# Patient Record
Sex: FEMALE | Race: WHITE | NOT HISPANIC OR LATINO | Employment: OTHER | URBAN - METROPOLITAN AREA
[De-identification: names, ages, dates, MRNs, and addresses within clinical notes are randomized per-mention and may not be internally consistent; named-entity substitution may affect disease eponyms.]

---

## 2017-05-25 ENCOUNTER — ALLSCRIPTS OFFICE VISIT (OUTPATIENT)
Dept: OTHER | Facility: OTHER | Age: 67
End: 2017-05-25

## 2017-05-25 DIAGNOSIS — Z12.31 ENCOUNTER FOR SCREENING MAMMOGRAM FOR MALIGNANT NEOPLASM OF BREAST: ICD-10-CM

## 2017-05-25 DIAGNOSIS — Z13.820 ENCOUNTER FOR SCREENING FOR OSTEOPOROSIS: ICD-10-CM

## 2017-05-25 DIAGNOSIS — Z78.0 ASYMPTOMATIC MENOPAUSAL STATE: ICD-10-CM

## 2017-05-31 LAB
HPV 18 (HISTORICAL): NOT DETECTED
HPV HIGH RISK 16/18 (HISTORICAL): NOT DETECTED
HPV16 (HISTORICAL): NOT DETECTED
PAP (HISTORICAL): NORMAL

## 2017-06-16 ENCOUNTER — HOSPITAL ENCOUNTER (OUTPATIENT)
Dept: RADIOLOGY | Age: 67
Discharge: HOME | End: 2017-06-16
Payer: COMMERCIAL

## 2017-06-16 DIAGNOSIS — Z12.31 ENCOUNTER FOR SCREENING MAMMOGRAM FOR MALIGNANT NEOPLASM OF BREAST: ICD-10-CM

## 2017-06-16 DIAGNOSIS — Z13.820 ENCOUNTER FOR SCREENING FOR OSTEOPOROSIS: ICD-10-CM

## 2017-06-16 DIAGNOSIS — Z78.0 ASYMPTOMATIC MENOPAUSAL STATE: ICD-10-CM

## 2017-06-16 PROCEDURE — 77080 DXA BONE DENSITY AXIAL: CPT

## 2017-06-16 PROCEDURE — G0202 SCR MAMMO BI INCL CAD: HCPCS

## 2018-01-14 VITALS
SYSTOLIC BLOOD PRESSURE: 140 MMHG | DIASTOLIC BLOOD PRESSURE: 88 MMHG | WEIGHT: 119 LBS | HEIGHT: 65 IN | BODY MASS INDEX: 19.83 KG/M2

## 2018-05-29 ENCOUNTER — ANNUAL EXAM (OUTPATIENT)
Dept: OBGYN CLINIC | Facility: CLINIC | Age: 68
End: 2018-05-29
Payer: MEDICARE

## 2018-05-29 VITALS
BODY MASS INDEX: 19.49 KG/M2 | DIASTOLIC BLOOD PRESSURE: 72 MMHG | SYSTOLIC BLOOD PRESSURE: 138 MMHG | HEIGHT: 65 IN | WEIGHT: 117 LBS

## 2018-05-29 DIAGNOSIS — Z01.419 GYNECOLOGIC EXAM NORMAL: Primary | ICD-10-CM

## 2018-05-29 DIAGNOSIS — Z12.31 ENCOUNTER FOR SCREENING MAMMOGRAM FOR MALIGNANT NEOPLASM OF BREAST: ICD-10-CM

## 2018-05-29 PROBLEM — Z78.0 MENOPAUSE: Status: ACTIVE | Noted: 2017-05-25

## 2018-05-29 PROCEDURE — G0101 CA SCREEN;PELVIC/BREAST EXAM: HCPCS | Performed by: PHYSICIAN ASSISTANT

## 2018-05-29 NOTE — ASSESSMENT & PLAN NOTE
Pap guidelines reviewed  Deferred pap today, Always normal and 2017 NILM (-)HRHPV     Return to office in 2 years for annual exam

## 2018-05-29 NOTE — PROGRESS NOTES
Assessment/Plan   Problem List Items Addressed This Visit     Gynecologic exam normal - Primary     Pap guidelines reviewed  Deferred pap today, Always normal and 2017 NILM (-)HRHPV  Return to office in 2 years for annual exam             Other Visit Diagnoses     Encounter for screening mammogram for malignant neoplasm of breast        Relevant Orders    Mammo screening bilateral w 3d & cad          Subjective:     Patient ID: Anupama Born is a 74 y o  y o  female  HPI  73 yo seen for annual exam  LMP:  no bleeding since  Denies bowel or bladder issues  Last pap: 2017 NILM (-)HRHPV  Last mammogram: 2017 BIRADS 2: Benign  Recommend 3D mammogram    The following portions of the patient's history were reviewed and updated as appropriate:   She  has no past medical history on file  She   Patient Active Problem List    Diagnosis Date Noted    Gynecologic exam normal 2018    Menopause 2017    Tick bite of back 10/28/2014     She  has no past surgical history on file  Her family history includes Hypertension in her father  She  reports that she has never smoked  She has never used smokeless tobacco  She reports that she drinks alcohol  She reports that she does not use drugs  Current Outpatient Prescriptions   Medication Sig Dispense Refill    Probiotic Product (PROBIOTIC-10 PO) Take by mouth       No current facility-administered medications for this visit  She has No Known Allergies       Menstrual History:  OB History      Para Term  AB Living    2 2 2     3    SAB TAB Ectopic Multiple Live Births          1 1         Menarche age: 17  Patient's last menstrual period was 2000 (within months)  Review of Systems   Constitutional: Negative for fatigue, fever and unexpected weight change  HENT: Negative for dental problem and sinus pressure  Eyes: Negative for visual disturbance     Respiratory: Negative for cough, shortness of breath and wheezing  Cardiovascular: Negative for chest pain  Gastrointestinal: Negative for abdominal pain, blood in stool, constipation, diarrhea, nausea and vomiting  Endocrine: Negative for polydipsia  Genitourinary: Negative for difficulty urinating, dyspareunia, dysuria, frequency, hematuria, pelvic pain and urgency  Musculoskeletal: Negative for arthralgias and back pain  Neurological: Negative for dizziness, seizures, light-headedness and headaches  Psychiatric/Behavioral: Negative for suicidal ideas  The patient is not nervous/anxious  Objective:     Physical Exam   Constitutional: She is oriented to person, place, and time  She appears well-developed and well-nourished  Genitourinary: Rectum normal, vagina normal and uterus normal  There is no rash, tenderness, lesion, injury or Bartholin's cyst on the right labia  There is no rash, tenderness, lesion, injury or Bartholin's cyst on the left labia  Vagina exhibits no lesion  No erythema, tenderness or bleeding in the vagina  No signs of injury around the vagina  No vaginal discharge found  Right adnexum does not display mass, does not display tenderness and does not display fullness  Left adnexum does not display mass, does not display tenderness and does not display fullness  Cervix does not exhibit motion tenderness, lesion or discharge  Uterus is not enlarged, tender, exhibiting a mass, irregular (is regular) or mobile  Rectal exam shows no external hemorrhoid, no internal hemorrhoid, no fissure, no mass, no tenderness, anal tone normal and guaiac negative stool  HENT:   Head: Normocephalic and atraumatic  Neck: No thyromegaly present  Cardiovascular: Normal rate, regular rhythm and normal heart sounds  Exam reveals no gallop and no friction rub  No murmur heard  Pulmonary/Chest: Effort normal and breath sounds normal  No respiratory distress  She has no wheezes   Right breast exhibits no inverted nipple, no mass, no nipple discharge, no skin change and no tenderness  Left breast exhibits no inverted nipple, no mass, no nipple discharge, no skin change and no tenderness  Breasts are symmetrical  There is no breast swelling  Abdominal: Soft  She exhibits no distension and no mass  There is no tenderness  There is no rebound and no guarding  No hernia  Lymphadenopathy:     She has no cervical adenopathy  Right: No inguinal adenopathy present  Left: No inguinal adenopathy present  Neurological: She is alert and oriented to person, place, and time  Skin: Skin is warm and dry  Psychiatric: She has a normal mood and affect   Her behavior is normal

## 2018-06-22 ENCOUNTER — HOSPITAL ENCOUNTER (OUTPATIENT)
Dept: RADIOLOGY | Age: 68
Discharge: HOME | End: 2018-06-22
Payer: MEDICARE

## 2018-06-22 ENCOUNTER — TRANSCRIBE ORDERS (OUTPATIENT)
Dept: OBGYN CLINIC | Facility: CLINIC | Age: 68
End: 2018-06-22

## 2018-06-22 DIAGNOSIS — Z12.31 ENCOUNTER FOR SCREENING MAMMOGRAM FOR MALIGNANT NEOPLASM OF BREAST: ICD-10-CM

## 2018-06-22 DIAGNOSIS — R92.8 ABNORMAL MAMMOGRAM: Primary | ICD-10-CM

## 2018-06-22 PROCEDURE — 77063 BREAST TOMOSYNTHESIS BI: CPT

## 2018-06-22 PROCEDURE — 77067 SCR MAMMO BI INCL CAD: CPT

## 2018-07-02 ENCOUNTER — HOSPITAL ENCOUNTER (OUTPATIENT)
Dept: RADIOLOGY | Facility: HOSPITAL | Age: 68
Discharge: HOME | End: 2018-07-02
Payer: MEDICARE

## 2018-07-02 DIAGNOSIS — R92.8 ABNORMAL MAMMOGRAM: ICD-10-CM

## 2018-07-02 PROCEDURE — 77065 DX MAMMO INCL CAD UNI: CPT

## 2018-07-17 ENCOUNTER — HOSPITAL ENCOUNTER (OUTPATIENT)
Dept: RADIOLOGY | Facility: HOSPITAL | Age: 68
Discharge: HOME | End: 2018-07-17
Payer: MEDICARE

## 2018-07-17 ENCOUNTER — TRANSCRIBE ORDERS (OUTPATIENT)
Dept: ADMINISTRATIVE | Facility: HOSPITAL | Age: 68
End: 2018-07-17

## 2018-07-17 VITALS
RESPIRATION RATE: 16 BRPM | DIASTOLIC BLOOD PRESSURE: 75 MMHG | SYSTOLIC BLOOD PRESSURE: 150 MMHG | OXYGEN SATURATION: 98 % | HEART RATE: 80 BPM

## 2018-07-17 DIAGNOSIS — R92.1 BREAST CALCIFICATIONS: ICD-10-CM

## 2018-07-17 DIAGNOSIS — R92.1 CALCIFICATION OF RIGHT BREAST: ICD-10-CM

## 2018-07-17 PROCEDURE — 88305 TISSUE EXAM BY PATHOLOGIST: CPT | Performed by: PATHOLOGY

## 2018-07-17 PROCEDURE — 19081 BX BREAST 1ST LESION STRTCTC: CPT

## 2018-07-17 PROCEDURE — 88361 TUMOR IMMUNOHISTOCHEM/COMPUT: CPT | Performed by: PATHOLOGY

## 2018-07-17 RX ORDER — LIDOCAINE HYDROCHLORIDE 10 MG/ML
INJECTION, SOLUTION INFILTRATION; PERINEURAL CODE/TRAUMA/SEDATION MEDICATION
Status: COMPLETED | OUTPATIENT
Start: 2018-07-17 | End: 2018-07-17

## 2018-07-17 RX ORDER — LIDOCAINE HYDROCHLORIDE AND EPINEPHRINE 10; 10 MG/ML; UG/ML
INJECTION, SOLUTION INFILTRATION; PERINEURAL CODE/TRAUMA/SEDATION MEDICATION
Status: COMPLETED | OUTPATIENT
Start: 2018-07-17 | End: 2018-07-17

## 2018-07-17 RX ADMIN — LIDOCAINE HYDROCHLORIDE 10 ML: 10 INJECTION, SOLUTION INFILTRATION; PERINEURAL at 09:02

## 2018-07-17 RX ADMIN — LIDOCAINE HYDROCHLORIDE AND EPINEPHRINE 20 ML: 10; 10 INJECTION, SOLUTION INFILTRATION; PERINEURAL at 09:06

## 2018-07-17 NOTE — SEDATION DOCUMENTATION
Procedure ended, pt tolerated procedure  Pressure held to site for 15 min total, pt with hematoma noted  Dr Lina Saleem examined pt, ace wrap recommended  steristrps to site with gauze dressing covering  Ace wrap to right breast  Ice pack applied  Discharge instructions given to patient and reviewed  Pt verbalized understanding

## 2018-07-23 NOTE — PROGRESS NOTES
7/23/18 at 1120    Patient Past Medical History: None    Allergies: NKDA    Past Breast History:     Previous Biopsy:   Yes______ No___X_____      Breast: Right____ Left______       Malignant______ Benign_______      Treatments if applicable      Present Breast History: (DCIS, high nuclear grade, solid type with central comedo necrosis)     Right_____X_____    Left_____________     Density_________    Calcifications____________   Palpable______________      Patient notified of results on: 7/20/18    Date of Appointment with Surgeon Dr Mary Kenyon on 7/30/18 at [phone]

## 2018-07-25 PROBLEM — D05.11 DUCTAL CARCINOMA IN SITU (DCIS) OF RIGHT BREAST: Status: ACTIVE | Noted: 2018-07-25

## 2018-07-30 ENCOUNTER — OFFICE VISIT (OUTPATIENT)
Dept: SURGICAL ONCOLOGY | Facility: CLINIC | Age: 68
End: 2018-07-30
Payer: MEDICARE

## 2018-07-30 VITALS
DIASTOLIC BLOOD PRESSURE: 90 MMHG | SYSTOLIC BLOOD PRESSURE: 142 MMHG | BODY MASS INDEX: 19.08 KG/M2 | HEART RATE: 80 BPM | TEMPERATURE: 97.7 F | HEIGHT: 65 IN | RESPIRATION RATE: 16 BRPM | WEIGHT: 114.5 LBS

## 2018-07-30 DIAGNOSIS — D05.11 DUCTAL CARCINOMA IN SITU (DCIS) OF RIGHT BREAST: Primary | ICD-10-CM

## 2018-07-30 PROCEDURE — 99205 OFFICE O/P NEW HI 60 MIN: CPT | Performed by: SURGERY

## 2018-07-30 NOTE — LETTER
July 30, 2018     [facility] [address] [place] [id number]    Patient: Mary Daly   YOB: 1950   Date of Visit: 7/30/2018       Dear Dr Sav Godoy:    Thank you for referring Manjeet Sebastian to me for evaluation  Below are my notes for this consultation  If you have questions, please do not hesitate to call me  I look forward to following your patient along with you  Sincerely,        Jolly De La Rosa MD        CC: No Recipients  Jolly De La Rosa MD  7/30/2018 12:27 PM  Sign at close encounter               Surgical Oncology Consult Note       [address] [facility] [address]  1950  [id number]  [address] [facility],[address]  [facility] [address] [address]      Chief Complaint:     Chief Complaint   Patient presents with    Consult     new DCIS right breast    Breast Cancer       Assessment and Plan:   Assessment/Plan   Patient has new diagnosis of right breast ductal carcinoma in situ, 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  The patient's microcalcifications extended relatively large area of her central breast extending from her nipple posterior to her muscle  I explained that removing this much of her central breast would be cosmetically deforming and that while a lumpectomy could potentially be done it would not be cosmetically acceptable and consequently I have recommended a mastectomy with possible reconstruction  The patient prefers reconstruction  We will have her see Dr Juan Alberto Payne for an opinion  All questions were answered the patient's satisfaction  We will see her back following plastic surgery      Oncology History:        Ductal carcinoma in situ (DCIS) of right breast    7/17/2018 Biopsy     Right breast biopsy  Upper inner breast  DCIS  Grade 3  ER 0  KS 0  Stage 0            History of Present Illness:   See above    Review of Systems:   Review of Systems   Constitutional: Negative for activity change, appetite change and fatigue  HENT: Negative  Eyes: Negative  Respiratory: Negative for cough, shortness of breath and wheezing  Cardiovascular: Negative for chest pain and leg swelling  Gastrointestinal: Negative  Endocrine: Negative  Genitourinary: Negative  Musculoskeletal:        No new changes or complaints of bone pain   Skin: Negative  Allergic/Immunologic: Negative  Neurological: Negative  Hematological: Negative  Psychiatric/Behavioral: Negative  Past Medical History:      Patient Active Problem List   Diagnosis    Menopause    Tick bite of back    Gynecologic exam normal    Ductal carcinoma in situ (DCIS) of right breast        Past Medical History:   Diagnosis Date    Childhood asthma       History reviewed  No pertinent surgical history  Family History   Problem Relation Age of Onset    Hypertension Father     Prostate cancer Father     Melanoma Sister         Social History     Social History    Marital status: Single     Spouse name: N/A    Number of children: N/A    Years of education: N/A     Occupational History    Not on file  Social History Main Topics    Smoking status: Never Smoker    Smokeless tobacco: Never Used    Alcohol use Yes      Comment: 2 drinks/mo    Drug use: No    Sexual activity: Yes     Partners: Male     Birth control/ protection: Post-menopausal     Other Topics Concern    Not on file     Social History Narrative    No narrative on file        Current Outpatient Prescriptions:     Probiotic Product (PROBIOTIC-10 PO), Take by mouth, Disp: , Rfl:    No Known Allergies    Physical Exam:     Vitals:    07/30/18 1130   BP: 142/90   Pulse: 80   Resp: 16   Temp: 97 7 °F (36 5 °C)     Physical Exam   Constitutional: She is oriented to person, place, and time  She appears well-developed and well-nourished     HENT:   Head: Normocephalic and atraumatic  Mouth/Throat: Oropharynx is clear and moist    Eyes: EOM are normal  Pupils are equal, round, and reactive to light  Neck: Normal range of motion  Neck supple  No JVD present  No tracheal deviation present  No thyromegaly present  Cardiovascular: Normal rate, regular rhythm, normal heart sounds and intact distal pulses  Exam reveals no gallop and no friction rub  No murmur heard  Pulmonary/Chest: Effort normal and breath sounds normal  No respiratory distress  She has no wheezes  She has no rales  There is a large hematoma surrounding the right periareolar region as outlined in the drawing  There is no axillary adenopathy  The left breast was entirely normal in both the sitting and supine position  Abdominal: Soft  She exhibits no distension and no mass  There is no hepatomegaly  There is no tenderness  There is no rebound and no guarding  Musculoskeletal: Normal range of motion  She exhibits no edema or tenderness  Lymphadenopathy:     She has no cervical adenopathy  Neurological: She is alert and oriented to person, place, and time  No cranial nerve deficit  Skin: Skin is warm and dry  No rash noted  No erythema  Psychiatric: She has a normal mood and affect  Her behavior is normal    Vitals reviewed  Results:   Pathology:  I reviewed her pathology report   Imaging  I reviewed her imaging  I also reviewed her calcifications with Dr Alan Ahumada  Based on that discussion of I recommended a mastectomy as outlined above  Discussion/Summary:   I explained the patient that she has ER negative AR negative grade 3 ductal carcinoma in situ covering a relatively large area of her breast   I have recommended a mastectomy for the reasons outlined above  The patient is agreeable to this approach  I have also recommended a plastic surgeon consult which she is agreeable to an in favor of  We will see her back following these areas    I also explained that because we would be doing a mastectomy I would recommend a sentinel lymph node biopsy and if there was cancer in the lymph node and axillary dissection  I explained the reasons for this  The patient is agreeable to this approach  We will review this further at her next visit  Advance Care Planning/Advance Directives:  I discussed the disease status, treatment plans and follow-up with the patient

## 2018-07-30 NOTE — PROGRESS NOTES
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  The patient's microcalcifications extended relatively large area of her central breast extending from her nipple posterior to her muscle  I explained that removing this much of her central breast would be cosmetically deforming and that while a lumpectomy could potentially be done it would not be cosmetically acceptable and consequently I have recommended a mastectomy with possible reconstruction  The patient prefers reconstruction  We will have her see Dr Hoang Nava for an opinion  All questions were answered the patient's satisfaction  We will see her back following plastic surgery  Oncology History:        Ductal carcinoma in situ (DCIS) of right breast    7/17/2018 Biopsy     Right breast biopsy  Upper inner breast  DCIS  Grade 3  ER 0  AZ 0  Stage 0            History of Present Illness:   See above    Review of Systems:   Review of Systems   Constitutional: Negative for activity change, appetite change and fatigue  HENT: Negative  Eyes: Negative  Respiratory: Negative for cough, shortness of breath and wheezing  Cardiovascular: Negative for chest pain and leg swelling  Gastrointestinal: Negative  Endocrine: Negative  Genitourinary: Negative  Musculoskeletal:        No new changes or complaints of bone pain   Skin: Negative  Allergic/Immunologic: Negative  Neurological: Negative      Hematological: Negative  Psychiatric/Behavioral: Negative  Past Medical History:      Patient Active Problem List   Diagnosis    Menopause    Tick bite of back    Gynecologic exam normal    Ductal carcinoma in situ (DCIS) of right breast        Past Medical History:   Diagnosis Date    Childhood asthma       History reviewed  No pertinent surgical history  Family History   Problem Relation Age of Onset    Hypertension Father     Prostate cancer Father     Melanoma Sister         Social History     Social History    Marital status: Single     Spouse name: N/A    Number of children: N/A    Years of education: N/A     Occupational History    Not on file  Social History Main Topics    Smoking status: Never Smoker    Smokeless tobacco: Never Used    Alcohol use Yes      Comment: 2 drinks/mo    Drug use: No    Sexual activity: Yes     Partners: Male     Birth control/ protection: Post-menopausal     Other Topics Concern    Not on file     Social History Narrative    No narrative on file        Current Outpatient Prescriptions:     Probiotic Product (PROBIOTIC-10 PO), Take by mouth, Disp: , Rfl:    No Known Allergies    Physical Exam:     Vitals:    07/30/18 1130   BP: 142/90   Pulse: 80   Resp: 16   Temp: 97 7 °F (36 5 °C)     Physical Exam   Constitutional: She is oriented to person, place, and time  She appears well-developed and well-nourished  HENT:   Head: Normocephalic and atraumatic  Mouth/Throat: Oropharynx is clear and moist    Eyes: EOM are normal  Pupils are equal, round, and reactive to light  Neck: Normal range of motion  Neck supple  No JVD present  No tracheal deviation present  No thyromegaly present  Cardiovascular: Normal rate, regular rhythm, normal heart sounds and intact distal pulses  Exam reveals no gallop and no friction rub  No murmur heard  Pulmonary/Chest: Effort normal and breath sounds normal  No respiratory distress  She has no wheezes  She has no rales  There is a large hematoma surrounding the right periareolar region as outlined in the drawing  There is no axillary adenopathy  The left breast was entirely normal in both the sitting and supine position  Abdominal: Soft  She exhibits no distension and no mass  There is no hepatomegaly  There is no tenderness  There is no rebound and no guarding  Musculoskeletal: Normal range of motion  She exhibits no edema or tenderness  Lymphadenopathy:     She has no cervical adenopathy  Neurological: She is alert and oriented to person, place, and time  No cranial nerve deficit  Skin: Skin is warm and dry  No rash noted  No erythema  Psychiatric: She has a normal mood and affect  Her behavior is normal    Vitals reviewed  Results:   Pathology:  I reviewed her pathology report   Imaging  I reviewed her imaging  I also reviewed her calcifications with Dr Kendall Joy  Based on that discussion of I recommended a mastectomy as outlined above  Discussion/Summary:   I explained the patient that she has ER negative SD negative grade 3 ductal carcinoma in situ covering a relatively large area of her breast   I have recommended a mastectomy for the reasons outlined above  The patient is agreeable to this approach  I have also recommended a plastic surgeon consult which she is agreeable to an in favor of  We will see her back following these areas  I also explained that because we would be doing a mastectomy I would recommend a sentinel lymph node biopsy and if there was cancer in the lymph node and axillary dissection  I explained the reasons for this  The patient is agreeable to this approach  We will review this further at her next visit  Advance Care Planning/Advance Directives:  I discussed the disease status, treatment plans and follow-up with the patient

## 2018-08-03 ENCOUNTER — TELEPHONE (OUTPATIENT)
Dept: HEMATOLOGY ONCOLOGY | Facility: CLINIC | Age: 68
End: 2018-08-03

## 2018-08-08 ENCOUNTER — OFFICE VISIT (OUTPATIENT)
Dept: PLASTIC SURGERY | Facility: CLINIC | Age: 68
End: 2018-08-08
Payer: MEDICARE

## 2018-08-08 VITALS — BODY MASS INDEX: 18.83 KG/M2 | HEIGHT: 65 IN | WEIGHT: 113 LBS

## 2018-08-08 DIAGNOSIS — D05.11 DUCTAL CARCINOMA IN SITU (DCIS) OF RIGHT BREAST: ICD-10-CM

## 2018-08-08 DIAGNOSIS — Z71.89 ENCOUNTER TO DISCUSS BREAST RECONSTRUCTION: Primary | ICD-10-CM

## 2018-08-08 PROCEDURE — 99204 OFFICE O/P NEW MOD 45 MIN: CPT | Performed by: SURGERY

## 2018-08-08 NOTE — PROGRESS NOTES
Assessment/Plan:  Please see HPI  She is planned to undergo a therapeutic right mastectomy, her understanding is that the nipple-areolar complex will be removed  We discussed options for reconstruction including immediate versus delayed reconstruction, tissue expander/implant based reconstruction, both traditional two-stage, as well as single stage direct to implant reconstruction  We discussed the role of acellular dermal matrix  We discussed the emerging roll of prepectoral reconstruction as well as the phenomenon of breast implant associated lymphoma  We also talked about autologous options for reconstruction to include latissimus dorsi flap, tram flap, both pedicled and free, as well as other micro surgical options, she is aware that I do not perform micro surgery, and that if she were interested in micro surgery that we would refer her to our practice micro surgeon  She currently wears a 34 a cup bra and she is interested in a bit larger breast volume postoperatively, likely a B cup  Latissimus dorsi contractions are strong, she does not have an ample abdominal pannus to utilize 1 of the abdominal flaps  The appropriate measurements and photographs were obtained  We reviewed photographs of reasonable results of breast reconstruction options, and their questions have been answered to their satisfaction  She is interested in immediate right breast reconstruction utilizing a tissue expander and acellular dermal matrix  Our surgical coordinator will be working with Dr Dana Anderson is office to arrange a mutually convenient date for surgery  At this point, she has a follow-up appoint with Dr Dana Anderson in 1 week  There are no diagnoses linked to this encounter  Subjective:  Right breast cancer     Patient ID: Yesi Vann is a 79 y o  female      HPI she has a recent diagnosis of ductal carcinoma in situ of the right breast, she has been referred by Dr Dana Anderson to discuss options for postmastectomy reconstruction    The following portions of the patient's history were reviewed and updated as appropriate: allergies, current medications, past family history, past medical history, past social history, past surgical history and problem list       Review of Systems   Constitutional: Negative for chills and fever  HENT: Negative for hearing loss  Eyes: Positive for visual disturbance  Negative for discharge  Wears eyeglasses   Respiratory: Positive for shortness of breath  Negative for chest tightness  Shortness of breath with exertion, worse due to high humidity   Cardiovascular: Negative for chest pain and leg swelling  Gastrointestinal: Negative for blood in stool, constipation, diarrhea and nausea  Genitourinary: Negative for dysuria  Musculoskeletal: Negative for gait problem  Skin: Negative for rash  Allergic/Immunologic: Negative for immunocompromised state  Neurological: Negative for seizures and headaches  Hematological: Does not bruise/bleed easily  Psychiatric/Behavioral: Negative for dysphoric mood  The patient is nervous/anxious  Anxiety given recent diagnosis         Objective:      Ht 5' 5" (1 651 m)   Wt 51 3 kg (113 lb)   LMP 01/01/2000 (Within Months)   BMI 18 80 kg/m²          Physical Exam   Constitutional: She is oriented to person, place, and time  She appears well-developed and well-nourished  HENT:   Head: Normocephalic  Eyes: Pupils are equal, round, and reactive to light  Neck: Normal range of motion  Pulmonary/Chest: Effort normal    Abdominal: Soft  Musculoskeletal: Normal range of motion  Neurological: She is alert and oriented to person, place, and time  Skin: Skin is warm  Breast examination reveals A cup breasts  The sternal notch to nipple distances are 19 5 cm on the left, 19 cm on the right, intermammary distance is 15 5 cm  The inframammary fold to nipple distances are 6 5 cm bilaterally   Base diameter is 11 cm bilaterally   Psychiatric: She has a normal mood and affect

## 2018-08-08 NOTE — LETTER
August 8, 2018     Nano Haas MD  [place] [id number]    Patient: Jose Chavez   YOB: 1950   Date of Visit: 8/8/2018       Dear Dr Lucio Bob:    Thank you for referring Crow Peace to me for evaluation  Below are my notes for this consultation  If you have questions, please do not hesitate to call me  I look forward to following your patient along with you  Sincerely,        Sage Bernardo MD        CC: No Recipients  Sage Bernardo MD  8/8/2018  9:42 AM  Sign at close encounter  Assessment/Plan:  Please see HPI  She is planned to undergo a therapeutic right mastectomy, her understanding is that the nipple-areolar complex will be removed  We discussed options for reconstruction including immediate versus delayed reconstruction, tissue expander/implant based reconstruction, both traditional two-stage, as well as single stage direct to implant reconstruction  We discussed the role of acellular dermal matrix  We discussed the emerging roll of prepectoral reconstruction as well as the phenomenon of breast implant associated lymphoma  We also talked about autologous options for reconstruction to include latissimus dorsi flap, tram flap, both pedicled and free, as well as other micro surgical options, she is aware that I do not perform micro surgery, and that if she were interested in micro surgery that we would refer her to our practice micro surgeon  She currently wears a 34 a cup bra and she is interested in a bit larger breast volume postoperatively, likely a B cup  Latissimus dorsi contractions are strong, she does not have an ample abdominal pannus to utilize 1 of the abdominal flaps  The appropriate measurements and photographs were obtained  We reviewed photographs of reasonable results of breast reconstruction options, and their questions have been answered to their satisfaction    She is interested in immediate right breast reconstruction utilizing a tissue expander and acellular dermal matrix  Our surgical coordinator will be working with Dr Anuradha Narayan is office to arrange a mutually convenient date for surgery  At this point, she has a follow-up appoint with Dr Anuradha Narayan in 1 week  There are no diagnoses linked to this encounter  Subjective:  Right breast cancer     Patient ID: Khurram Grider is a 79 y o  female  HPI she has a recent diagnosis of ductal carcinoma in situ of the right breast, she has been referred by Dr Anuradha Narayan to discuss options for postmastectomy reconstruction    The following portions of the patient's history were reviewed and updated as appropriate: allergies, current medications, past family history, past medical history, past social history, past surgical history and problem list       Review of Systems   Constitutional: Negative for chills and fever  HENT: Negative for hearing loss  Eyes: Positive for visual disturbance  Negative for discharge  Wears eyeglasses   Respiratory: Positive for shortness of breath  Negative for chest tightness  Shortness of breath with exertion, worse due to high humidity   Cardiovascular: Negative for chest pain and leg swelling  Gastrointestinal: Negative for blood in stool, constipation, diarrhea and nausea  Genitourinary: Negative for dysuria  Musculoskeletal: Negative for gait problem  Skin: Negative for rash  Allergic/Immunologic: Negative for immunocompromised state  Neurological: Negative for seizures and headaches  Hematological: Does not bruise/bleed easily  Psychiatric/Behavioral: Negative for dysphoric mood  The patient is nervous/anxious  Anxiety given recent diagnosis         Objective:      Ht 5' 5" (1 651 m)   Wt 51 3 kg (113 lb)   LMP 01/01/2000 (Within Months)   BMI 18 80 kg/m²           Physical Exam   Constitutional: She is oriented to person, place, and time  She appears well-developed and well-nourished     HENT:   Head: Normocephalic  Eyes: Pupils are equal, round, and reactive to light  Neck: Normal range of motion  Pulmonary/Chest: Effort normal    Abdominal: Soft  Musculoskeletal: Normal range of motion  Neurological: She is alert and oriented to person, place, and time  Skin: Skin is warm  Psychiatric: She has a normal mood and affect

## 2018-08-13 NOTE — PATIENT INSTRUCTIONS
Pre-Surgery Instructions:   Medication Instructions    Probiotic Product (PROBIOTIC-10 PO) Stop taking 1 days prior to surgery         Michael-Hahn Drain Care   AMBULATORY CARE:   A Michael-Hahn (MYRIAM) drain  is used to remove fluids that build up in an area of your body after surgery  The MYRIAM drain is a bulb shaped device connected to a tube  One end of the tube is placed inside you during surgery  The other end comes out through a small cut in your skin  The bulb is connected to this end  You may have a stitch to hold the tube in place  Seek care immediately if:   · Your MYRIAM drain breaks or comes out  · You have cloudy yellow or brown drainage from your MYRIAM drain site, or the drainage smells bad  Contact your healthcare provider if:   · You drain less than 30 milliliters (2 tablespoons) in 24 hours  This may mean your drain can be removed  · You suddenly stop draining fluid or think your MYRIAM drain is blocked  · You have a fever higher than 101 5°F (38 6°C)  · You have increased pain, redness, or swelling around the drain site  · You have questions about your MYRIAM drain care  How a Michael-Hahn drain works: The MYRIAM drain removes fluids by creating suction in the tube  The bulb is squeezed flat and connected to the tube that sticks out of your body  The bulb expands as it fills with fluid  How to change the bandage around your Michael-Hahn drain:  If you have a bandage, change it once a day  You may need to change your bandage more than once a day if it gets completely wet  · Wash your hands with soap and water  · Loosen the tape and gently remove the old bandage  Throw the old bandage into a plastic trash bag  · Use soap and water or saline (salt water) solution to clean your MYRIAM drain site  Dip a cotton swab or gauze pad in the solution and gently clean your skin  · Pat the area dry  · Place a new bandage on your MYRIAM drain site and secure it to your skin with medical tape  · Wash your hands  How to empty the Michael-Hahn drain:  Empty the bulb when it is half full or every 8 to 12 hours  · Wash your hands with soap and water  · Remove the plug from the bulb  · Pour the fluid into a measuring cup  · Clean the plug with an alcohol swab or a cotton ball dipped in rubbing alcohol  · Squeeze the bulb flat and put the plug back in  The bulb should stay flat until it starts to fill with fluid again  · Measure the amount of fluid you pour out  Write down how much fluid you empty from the MYRIAM drain and the date and time you collected it  · Flush the fluid down the toilet  Wash your hands  Clear clogged tubing: Use the following steps to clear your Michael-Hahn tubing:  · Hold the tubing between your thumb and first finger at the place closest to your skin  This hand will prevent the tube from being pulled out of your skin  · Use your other thumb and first finger to slide the clog down the tubing toward the bulb  You may have to repeat the sliding until the tubing is unclogged  Michael-Hahn drain removal:  The amount of fluid that you drain will decrease as your wound heals  The MYRIAM drain usually is removed when less than 30 milliliters (2 tablespoons) is collected in 24 hours  Ask your healthcare provider when and how your MYRIAM drain will be removed  Follow up with your healthcare provider as directed:  Write down your questions so you remember to ask them during your visits  © 2017 [address] Information is for End User's use only and may not be sold, redistributed or otherwise used for commercial purposes  All illustrations and images included in CareNotes® are the copyrighted property of [employer]  or [address]  The above information is an  only  It is not intended as medical advice for individual conditions or treatments   Talk to your doctor, nurse or pharmacist before following any medical regimen to see if it is safe and effective for you  [place] Lymph Node Biopsy   AMBULATORY CARE:   What you need to know about a sentinel lymph node biopsy:  A sentinel lymph node (SLN) is usually the lymph node closest to a tumor  A biopsy is a procedure used to find and remove a SLN  During the biopsy, the SLN will be tested for cancer cells  If the test is positive, it may mean that cancer has spread to other places in your body  This information can help your healthcare provider decide what other treatments you need  How to prepare for a sentinel lymph node biopsy:   · You may need a nuclear scan before your procedure  During a nuclear scan, healthcare providers will inject a small amount of radioactive liquid near the tumor  Radioactive liquid will move to the location of your lymph nodes and help them show up better in pictures  A camera will take pictures of the lymph nodes  The pictures will help your healthcare provider plan for your procedure  · Your healthcare provider will talk to you about how to prepare for your procedure  He may tell you not to eat or drink anything after midnight on the day of your procedure  He will tell you what medicines to take or not take on the day of your procedure  You may be given contrast liquid during your biopsy  Tell your healthcare provider if you have ever had an allergic reaction to contrast liquid  Arrange for someone to drive you home and stay with you after your procedure  What will happen during a sentinel lymph node biopsy:   · You may be given an antibiotic through your IV to help prevent a bacterial infection  You may be given general anesthesia to keep you asleep and free from pain during procedure  You may instead be given local anesthesia to numb the area  With local anesthesia, you may still feel pressure or pushing during the procedure, but you should not feel any pain       · Your healthcare provider will inject blue contrast liquid, radioactive liquid, or both near the tumor  The liquid will move to the SLN  Your healthcare provider may use an instrument to help find the SLN  He will do this by gently moving an instrument over your skin  The instrument will show pictures of the SLN on a monitor  Your healthcare provider will make a small incision in the skin that covers the SLN  The SLN will be removed and checked for cancer cells  If cancer is found, your healthcare provider may remove several more lymph nodes for testing  Your incision may be closed with stitches or strips of medical tape and covered with a bandage  What will happen after a sentinel lymph node biopsy:  Healthcare providers will monitor you until you are awake  You may be able to go home after you are awake and your pain is controlled  Your urine or bowel movement may be blue for 24 to 48 hours after your procedure  This is caused by the blue contrast liquid given to you during the procedure  You may have bruising or swelling at the biopsy site  This is normal and expected  The arm or leg closest to the biopsy site may be sore  This should get better within 48 to 72 hours  Risks of a sentinel lymph node biopsy: You may bleed more than expected or get an infection  You may develop a condition called lymphedema  Lymphedema is tissue swelling in the body part nearest to where the SLN was removed  You may have long-term pain or discomfort in this area  Your skin in this area may be permanently thick or hard  Your nerves may be damaged during the procedure  This may cause numbness or tingling where the SLN was removed  It may also cause difficulty moving the body part closest to the SLN  You may have an allergic reaction to the contrast liquid  This may require medicine or other treatments  Seek care immediately if:   · Blood soaks through your bandage  · Your stitches come apart  · Your bruise suddenly gets larger and feels firm    Contact your healthcare provider if:   · You have a fever or chills  · Your wound is red, swollen, or draining pus  · You have nausea or are vomiting  · Your skin is itchy, swollen, or you have a rash  · Your pain does not get better after you take medicine for pain  · You have questions or concerns about your condition or care  Medicines: You may need any of the following:  · NSAIDs , such as ibuprofen, help decrease swelling, pain, and fever  This medicine is available with or without a doctor's order  NSAIDs can cause stomach bleeding or kidney problems in certain people  If you take blood thinner medicine, always ask your healthcare provider if NSAIDs are safe for you  Always read the medicine label and follow directions  · Acetaminophen  decreases pain and fever  It is available without a doctor's order  Ask how much to take and how often to take it  Follow directions  Read the labels of all other medicines you are using to see if they also contain acetaminophen, or ask your doctor or pharmacist  Acetaminophen can cause liver damage if not taken correctly  Do not use more than 4 grams (4,000 milligrams) total of acetaminophen in one day  · Prescription pain medicine  may be given  Ask your healthcare provider how to take this medicine safely  Some prescription pain medicines contain acetaminophen  Do not take other medicines that contain acetaminophen without talking to your healthcare provider  Too much acetaminophen may cause liver damage  Prescription pain medicine may cause constipation  Ask your healthcare provider how to prevent or treat constipation  · Take your medicine as directed  Contact your healthcare provider if you think your medicine is not helping or if you have side effects  Tell him or her if you are allergic to any medicine  Keep a list of the medicines, vitamins, and herbs you take  Include the amounts, and when and why you take them  Bring the list or the pill bottles to follow-up visits   Carry your medicine list with you in case of an emergency  Care for your wound as directed:  Ask your healthcare provider when your incision can get wet  Carefully wash around the incision with soap and water  It is okay to let soap and water gently run over your incision  Do not  scrub your incision  Gently pat dry the area and put on new, clean bandages as directed  Change your bandages when they get wet or dirty  If you have strips of medical tape, let them fall of on their own  It may take 10 to 14 days for them to fall off  Check your incision every day for signs of infection, such as redness, swelling, or pus  Do not put powders or lotions on your incision  If lymph nodes have been taken from your armpit, ask your healthcare provider when you can wear deodorant  Self-care:   · Apply ice  on your incision for 15 to 20 minutes every hour or as directed  Use an ice pack, or put crushed ice in a plastic bag  Cover it with a towel before you apply it to your skin  Ice helps prevent tissue damage and decreases swelling and pain  · Elevate  your arm or leg nearest to the biopsy site as often as you can  This will help decrease swelling and pain  Prop your arm or leg on pillows or blankets to keep it elevated above the level of your heart comfortably  · Do not do strenuous activities  for 24 to 48 hours  Strenuous activities include heavy lifting, sports, or running  If lymph nodes were taken from your armpit, do not push or pull with your arm  These activities may put too much stress on your incision  Rest and take short walks around the house  Ask your healthcare provider when you can return to your normal activities  · Drink plenty of liquids  as directed  This will help flush out contrast liquid from your body  Ask how much liquid to drink each day and which liquids are best for you  Ask your healthcare provider how to prevent lymphedema and infection:  Lymphedema is fluid buildup in fatty tissues under your skin   Lymphedema may happen where lymph nodes were removed or in the arm or leg closest to this area  An infection in your skin can make lymphedema worse  Ask your healthcare provider how you can decrease your risk for skin infections and lymphedema  Follow up with your healthcare provider as directed:  Write down your questions so you remember to ask them during your visits  © 2017 [address] Information is for End User's use only and may not be sold, redistributed or otherwise used for commercial purposes  All illustrations and images included in CareNotes® are the copyrighted property of [employer]  or [employer]  The above information is an  only  It is not intended as medical advice for individual conditions or treatments  Talk to your doctor, nurse or pharmacist before following any medical regimen to see if it is safe and effective for you  Mastectomy   AMBULATORY CARE:   What you need to know about a mastectomy:  A mastectomy is surgery to remove all or part of your breast  Tissue, lymph nodes, or muscle near the breast may also be removed  A mastectomy is done to treat breast cancer and prevent cancer from spreading  A mastectomy can also be done to prevent breast cancer  This may be a choice if you are at high risk for breast cancer  The type of mastectomy you need may depend on the size of the tumor  It may also depend on if the cancer has spread  How to prepare for a mastectomy:  Your healthcare provider will talk to you about how to prepare for surgery  He may tell you not to eat or drink anything after midnight on the day of your surgery  He will tell you what medicines to take or not take on the day of your surgery  You may need to stop taking aspirin or blood thinners several days before surgery  Arrange for someone to drive you home and stay with you after surgery  This person can help care for you and watch for complications from surgery     What will happen during a mastectomy:   · You will be given general anesthesia to keep you asleep and free from pain during surgery  You may be given an antibiotic through your IV to help prevent a bacterial infection  Your healthcare provider will make an incision over your breast  He will remove the tumor and breast tissue  He may also remove muscle from behind your breast  If lymph nodes will be taken, a small incision will be made in your armpit  The lymph nodes will be removed and tested for cancer  · One or more drains may be inserted near your incision  A drain removes extra fluid and helps your incision heal  Your healthcare provider will close your incision with stitches and cover it with a bandage  He may also wrap a tight-fitting bandage around both of your breasts  This may decrease swelling, bleeding, and pain  What will happen after a mastectomy:  Healthcare providers will monitor you until you are awake  You may need to spend 1 to 2 nights in the hospital  You may have difficulty moving your arm closest to your mastectomy  This should get better in a few days  Get out of bed and walk when your healthcare provider says it is safe  This will help prevent blood clots  Risks of a mastectomy:  You may bleed more than expected or get an infection  Nerves, blood vessels, and muscles may be damaged during your surgery  Blood or fluid may collect under your skin  You may need other procedures to remove the fluid or blood  You may have swelling in your arm closest to the mastectomy or where lymph nodes were removed  This swelling is called lymphedema  Lymphedema may cause tingling, numbness, stiffness, and weakness in your arm  This may be permanent  You may get a blood clot in your arm or leg  The blood clot may travel to your heart, lungs, or brain  This may become life-threatening  Call 911 for any of the following:   · You feel lightheaded, short of breath, and have chest pain  · You cough up blood       · You have trouble breathing  Seek care immediately if:   · Blood soaks through your bandage  · Your stitches come apart  · Your bruise suddenly gets bigger  · Your leg or arm is larger than normal and painful  Contact your healthcare provider if:   · You have a fever or chills  · Your wound is red, swollen, or draining pus  · You have nausea or are vomiting  · Your skin is itchy, swollen, or you have a rash  · Your pain does not get better after you take pain medicine  · Your drain falls out or stops draining fluid  · Your drain has pus or foul-smelling fluid coming out of it  · You have numbness, tingling, or swelling in your arm or hand  · You feel very sad or anxious  · You have trouble coping with your condition  · You have questions or concerns about your condition or care  Medicines: You may need any of the following:  · Antibiotics  help prevent a bacterial infection  · Prescription pain medicine  may be given  Ask your healthcare provider how to take this medicine safely  Some prescription pain medicines contain acetaminophen  Do not take other medicines that contain acetaminophen without talking to your healthcare provider  Too much acetaminophen may cause liver damage  Prescription pain medicine may cause constipation  Ask your healthcare provider how to prevent or treat constipation  · NSAIDs , such as ibuprofen, help decrease swelling, pain, and fever  NSAIDs can cause stomach bleeding or kidney problems in certain people  If you take blood thinner medicine, always ask your healthcare provider if NSAIDs are safe for you  Always read the medicine label and follow directions  · Take your medicine as directed  Contact your healthcare provider if you think your medicine is not helping or if you have side effects  Tell him or her if you are allergic to any medicine  Keep a list of the medicines, vitamins, and herbs you take   Include the amounts, and when and why you take them  Bring the list or the pill bottles to follow-up visits  Carry your medicine list with you in case of an emergency  Care for your wound as directed: If you have a tight-fitting bandage, you can remove it in 24 to 48 hours, or as directed  Ask your healthcare provider when your incision can get wet  You may need to take a sponge bath until your drain is removed  Carefully wash around the incision with soap and water  It is okay to allow the soap and water to gently run over your incision  Gently pat dry the area and put on new, clean bandages as directed  Change your bandages when they get wet or dirty  If lymph nodes were removed from your armpit, ask your healthcare provider when you can wear deodorant  Check your incision every day for redness, pus, or swelling  Self-care:   · Apply ice  on your incision for 15 to 20 minutes every hour or as directed  Use an ice pack, or put crushed ice in a plastic bag  Cover it with a towel  Ice helps prevent tissue damage and decreases swelling and pain  · Elevate  your arm nearest to your incision above the level of your heart  Do this as often as you can  This will help decrease swelling and pain  Prop your arm on pillows or blankets to keep it elevated comfortably  · Rest  as directed  Do not lift anything heavier than 5 pounds  Do not push or pull with your arms  You can use your arms to groom, eat, and bathe  Take short walks around the house  Gradually walk further as you feel better  Ask your healthcare provider when you can return to your normal activities  · Do not sleep on your stomach  This will put too much pressure on your incision  Sleep on your back or on the side opposite to your incision  · Empty your drain  as directed  You may need to write down how much fluid you empty from your drain each day  Ask your healthcare provider for more information about how to empty your drain  · Wear a supportive bra  as directed   Wait until you remove the tight-fitting bandage to wear a bra  You may be given a surgical bra or told to wear a sports bra  A supportive bra may help hold your bandages in place  It may also help with swelling and pain  Do not  wear bras with lace or underwire  They may rub against your incision and cause discomfort  Arm stretches: Your healthcare provider may show you how to do arm stretches  Arm stretches may prevent stiff arms or shoulders  You may need to wait until after your drains are removed to begin stretching  Do not do arm stretches until your healthcare provider says it is okay  Ask your healthcare provider for more information about arm stretches  More information and support: You may have difficulty coping with the changes to your body  Talk to your family or friends about how you are feeling  Ask your healthcare provider about support groups  It may be helpful to talk with other women who have had a mastectomy  · [address] [place] [address]  Phone: [phone]  Web Address: [email or web address]  · [address] [place] [address]  Phone: [phone]  Web Address: [email or web address]  gov  Follow up with your healthcare provider as directed:  Write down your questions so you remember to ask them during your visits  © 2017 [address] [place] [address] Information is for End User's use only and may not be sold, redistributed or otherwise used for commercial purposes  All illustrations and images included in CareNotes® are the copyrighted property of [employer]  or [employer]  The above information is an  only  It is not intended as medical advice for individual conditions or treatments  Talk to your doctor, nurse or pharmacist before following any medical regimen to see if it is safe and effective for you

## 2018-08-15 ENCOUNTER — OFFICE VISIT (OUTPATIENT)
Dept: SURGICAL ONCOLOGY | Facility: CLINIC | Age: 68
End: 2018-08-15

## 2018-08-15 VITALS
HEIGHT: 65 IN | TEMPERATURE: 99 F | HEART RATE: 80 BPM | BODY MASS INDEX: 18.99 KG/M2 | DIASTOLIC BLOOD PRESSURE: 84 MMHG | WEIGHT: 114 LBS | RESPIRATION RATE: 12 BRPM | SYSTOLIC BLOOD PRESSURE: 166 MMHG

## 2018-08-15 DIAGNOSIS — D05.11 DUCTAL CARCINOMA IN SITU (DCIS) OF RIGHT BREAST: Primary | ICD-10-CM

## 2018-08-15 PROCEDURE — 99024 POSTOP FOLLOW-UP VISIT: CPT | Performed by: SURGERY

## 2018-08-15 NOTE — PROGRESS NOTES
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  We went through the process of informed consent for right mastectomy in conjunction with a sentinel lymph node biopsy and possible axillary dissection  This was reviewed in detail with her  All questions were answered the patient's satisfaction  Cancer History:        Ductal carcinoma in situ (DCIS) of right breast    7/17/2018 Biopsy     Right breast biopsy  Upper inner breast  DCIS  Grade 3  ER 0  VT 0  Stage 0              Interval History:   See above    Review of Systems:   Review of Systems   Constitutional: Negative for activity change, appetite change and fatigue  HENT: Negative  Eyes: Negative  Respiratory: Negative for cough, shortness of breath and wheezing  Cardiovascular: Negative for chest pain and leg swelling  Gastrointestinal: Negative  Endocrine: Negative  Genitourinary: Negative  Musculoskeletal:        No new changes or complaints of bone pain   Skin: Negative  Allergic/Immunologic: Negative  Neurological: Negative  Hematological: Negative  Psychiatric/Behavioral: Negative          Past Medical History     Patient Active Problem List   Diagnosis    Menopause    Tick bite of back    Gynecologic exam normal    Ductal carcinoma in situ (DCIS) of right breast     Past Medical History:   Diagnosis Date    Childhood asthma      No past surgical history on file   Family History   Problem Relation Age of Onset    Hypertension Father     Prostate cancer Father     Melanoma Sister      Social History     Social History    Marital status: Single     Spouse name: N/A    Number of children: N/A    Years of education: N/A     Occupational History    Not on file  Social History Main Topics    Smoking status: Never Smoker    Smokeless tobacco: Never Used    Alcohol use Yes      Comment: 2 drinks/mo    Drug use: No    Sexual activity: Yes     Partners: Male     Birth control/ protection: Post-menopausal     Other Topics Concern    Not on file     Social History Narrative    No narrative on file       Current Outpatient Prescriptions:     Probiotic Product (PROBIOTIC-10 PO), Take by mouth, Disp: , Rfl:   No Known Allergies    Physical Exam:     Vitals:    08/15/18 1202   BP: 166/84   Pulse: 80   Resp: 12   Temp: 99 °F (37 2 °C)     Physical Exam   Pulmonary/Chest:   Examination of the right breast demonstrates ecchymosis as well as a dominant mass just superior to the nipple-areolar complex  This is most likely secondary to hematoma if this is not resolved this area of the skin would most likely be resected at the time of surgery to minimize the chance of a positive skin margin  Results:             Advance Care Planning/Advance Directives:  I discussed the disease status, treatment plans and follow-up with the patient

## 2018-08-21 ENCOUNTER — LAB (OUTPATIENT)
Dept: LAB | Facility: CLINIC | Age: 68
End: 2018-08-21
Payer: MEDICARE

## 2018-08-21 ENCOUNTER — HOSPITAL ENCOUNTER (OUTPATIENT)
Dept: RADIOLOGY | Facility: HOSPITAL | Age: 68
Discharge: HOME | End: 2018-08-21
Attending: SURGERY
Payer: MEDICARE

## 2018-08-21 ENCOUNTER — TRANSCRIBE ORDERS (OUTPATIENT)
Dept: LAB | Facility: CLINIC | Age: 68
End: 2018-08-21

## 2018-08-21 ENCOUNTER — APPOINTMENT (OUTPATIENT)
Dept: LAB | Facility: CLINIC | Age: 68
End: 2018-08-21
Payer: MEDICARE

## 2018-08-21 DIAGNOSIS — D05.11 INTRADUCTAL CARCINOMA IN SITU OF RIGHT BREAST: Primary | ICD-10-CM

## 2018-08-21 DIAGNOSIS — D05.11 DUCTAL CARCINOMA IN SITU (DCIS) OF RIGHT BREAST: ICD-10-CM

## 2018-08-21 DIAGNOSIS — D05.11 INTRADUCTAL CARCINOMA IN SITU OF RIGHT BREAST: ICD-10-CM

## 2018-08-21 LAB
ALBUMIN SERPL BCP-MCNC: 3.6 G/DL (ref 3.5–5)
ALP SERPL-CCNC: 50 U/L (ref 46–116)
ALT SERPL W P-5'-P-CCNC: 26 U/L (ref 12–78)
ANION GAP SERPL CALCULATED.3IONS-SCNC: 5 MMOL/L (ref 4–13)
AST SERPL W P-5'-P-CCNC: 23 U/L (ref 5–45)
BASOPHILS # BLD AUTO: 0.03 THOUSANDS/ΜL (ref 0–0.1)
BASOPHILS NFR BLD AUTO: 1 % (ref 0–1)
BILIRUB SERPL-MCNC: 0.8 MG/DL (ref 0.2–1)
BUN SERPL-MCNC: 13 MG/DL (ref 5–25)
CALCIUM SERPL-MCNC: 8.9 MG/DL (ref 8.3–10.1)
CHLORIDE SERPL-SCNC: 104 MMOL/L (ref 100–108)
CO2 SERPL-SCNC: 30 MMOL/L (ref 21–32)
CREAT SERPL-MCNC: 0.63 MG/DL (ref 0.6–1.3)
EOSINOPHIL # BLD AUTO: 0.19 THOUSAND/ΜL (ref 0–0.61)
EOSINOPHIL NFR BLD AUTO: 5 % (ref 0–6)
ERYTHROCYTE [DISTWIDTH] IN BLOOD BY AUTOMATED COUNT: 12.3 % (ref 11.6–15.1)
GFR SERPL CREATININE-BSD FRML MDRD: 94 ML/MIN/1.73SQ M
GLUCOSE P FAST SERPL-MCNC: 98 MG/DL (ref 65–99)
HCT VFR BLD AUTO: 40.9 % (ref 34.8–46.1)
HGB BLD-MCNC: 13.7 G/DL (ref 11.5–15.4)
IMM GRANULOCYTES # BLD AUTO: 0.01 THOUSAND/UL (ref 0–0.2)
IMM GRANULOCYTES NFR BLD AUTO: 0 % (ref 0–2)
LYMPHOCYTES # BLD AUTO: 1.82 THOUSANDS/ΜL (ref 0.6–4.47)
LYMPHOCYTES NFR BLD AUTO: 45 % (ref 14–44)
MCH RBC QN AUTO: 30.8 PG (ref 26.8–34.3)
MCHC RBC AUTO-ENTMCNC: 33.5 G/DL (ref 31.4–37.4)
MCV RBC AUTO: 92 FL (ref 82–98)
MONOCYTES # BLD AUTO: 0.34 THOUSAND/ΜL (ref 0.17–1.22)
MONOCYTES NFR BLD AUTO: 8 % (ref 4–12)
NEUTROPHILS # BLD AUTO: 1.65 THOUSANDS/ΜL (ref 1.85–7.62)
NEUTS SEG NFR BLD AUTO: 41 % (ref 43–75)
NRBC BLD AUTO-RTO: 0 /100 WBCS
PLATELET # BLD AUTO: 195 THOUSANDS/UL (ref 149–390)
PMV BLD AUTO: 9.6 FL (ref 8.9–12.7)
POTASSIUM SERPL-SCNC: 4 MMOL/L (ref 3.5–5.3)
PROT SERPL-MCNC: 6.8 G/DL (ref 6.4–8.2)
RBC # BLD AUTO: 4.45 MILLION/UL (ref 3.81–5.12)
SODIUM SERPL-SCNC: 139 MMOL/L (ref 136–145)
WBC # BLD AUTO: 4.04 THOUSAND/UL (ref 4.31–10.16)

## 2018-08-21 PROCEDURE — 85025 COMPLETE CBC W/AUTO DIFF WBC: CPT

## 2018-08-21 PROCEDURE — 71046 X-RAY EXAM CHEST 2 VIEWS: CPT

## 2018-08-21 PROCEDURE — 36415 COLL VENOUS BLD VENIPUNCTURE: CPT

## 2018-08-21 PROCEDURE — 93005 ELECTROCARDIOGRAM TRACING: CPT

## 2018-08-21 PROCEDURE — 80053 COMPREHEN METABOLIC PANEL: CPT

## 2018-08-22 LAB
ATRIAL RATE: 67 BPM
P AXIS: 71 DEGREES
PR INTERVAL: 134 MS
QRS AXIS: 65 DEGREES
QRSD INTERVAL: 70 MS
QT INTERVAL: 422 MS
QTC INTERVAL: 445 MS
T WAVE AXIS: 65 DEGREES
VENTRICULAR RATE: 67 BPM

## 2018-08-22 PROCEDURE — 93010 ELECTROCARDIOGRAM REPORT: CPT | Performed by: INTERNAL MEDICINE

## 2018-08-27 NOTE — PRE-PROCEDURE INSTRUCTIONS
Pre-Surgery Instructions:   Medication Instructions    Probiotic Product (PROBIOTIC-10 PO) Instructed patient per Anesthesia Guidelines      Pre op and showering instructions reviewed-Patient has hibiclens

## 2018-09-10 NOTE — H&P
Assessment/Plan:  Please see HPI  She is planned to undergo a therapeutic right mastectomy, her understanding is that the nipple-areolar complex will be removed  We discussed options for reconstruction including immediate versus delayed reconstruction, tissue expander/implant based reconstruction, both traditional two-stage, as well as single stage direct to implant reconstruction  We discussed the role of acellular dermal matrix  We discussed the emerging roll of prepectoral reconstruction as well as the phenomenon of breast implant associated lymphoma  We also talked about autologous options for reconstruction to include latissimus dorsi flap, tram flap, both pedicled and free, as well as other micro surgical options, she is aware that I do not perform micro surgery, and that if she were interested in micro surgery that we would refer her to our practice micro surgeon  She currently wears a 34 a cup bra and she is interested in a bit larger breast volume postoperatively, likely a B cup  Latissimus dorsi contractions are strong, she does not have an ample abdominal pannus to utilize 1 of the abdominal flaps  The appropriate measurements and photographs were obtained  We reviewed photographs of reasonable results of breast reconstruction options, and their questions have been answered to their satisfaction  She is interested in immediate right breast reconstruction utilizing a tissue expander and acellular dermal matrix  Our surgical coordinator will be working with Dr Elton Larson is office to arrange a mutually convenient date for surgery   At this point, she has a follow-up appoint with Dr Elton Larson in 1 week             There are no diagnoses linked to this encounter        Subjective:  Right breast cancer      Patient ID: Keily Díaz is a 79 y o  female      HPI she has a recent diagnosis of ductal carcinoma in situ of the right breast, she has been referred by Dr Elton Larson to discuss options for postmastectomy reconstruction     The following portions of the patient's history were reviewed and updated as appropriate: allergies, current medications, past family history, past medical history, past social history, past surgical history and problem list         Review of Systems   Constitutional: Negative for chills and fever  HENT: Negative for hearing loss  Eyes: Positive for visual disturbance  Negative for discharge  Wears eyeglasses   Respiratory: Positive for shortness of breath  Negative for chest tightness  Shortness of breath with exertion, worse due to high humidity   Cardiovascular: Negative for chest pain and leg swelling  Gastrointestinal: Negative for blood in stool, constipation, diarrhea and nausea  Genitourinary: Negative for dysuria  Musculoskeletal: Negative for gait problem  Skin: Negative for rash  Allergic/Immunologic: Negative for immunocompromised state  Neurological: Negative for seizures and headaches  Hematological: Does not bruise/bleed easily  Psychiatric/Behavioral: Negative for dysphoric mood  The patient is nervous/anxious  Anxiety given recent diagnosis          Objective:        Ht 5' 5" (1 651 m)   Wt 51 3 kg (113 lb)   LMP 01/01/2000 (Within Months)   BMI 18 80 kg/m²             Physical Exam   Constitutional: She is oriented to person, place, and time  She appears well-developed and well-nourished  HENT:   Head: Normocephalic  Eyes: Pupils are equal, round, and reactive to light  Neck: Normal range of motion  Pulmonary/Chest: Effort normal  CTAB whithout wheezes  Cardiac: RRR without murmurs  Abdominal: Soft  Musculoskeletal: Normal range of motion  Neurological: She is alert and oriented to person, place, and time  Skin: Skin is warm  Breast examination reveals A cup breasts  The sternal notch to nipple distances are 19 5 cm on the left, 19 cm on the right, intermammary distance is 15 5 cm    The inframammary fold to nipple distances are 6 5 cm bilaterally  Base diameter is 11 cm bilaterally   Psychiatric: She has a normal mood and affect

## 2018-09-11 ENCOUNTER — ANESTHESIA EVENT (OUTPATIENT)
Dept: PERIOP | Facility: HOSPITAL | Age: 68
End: 2018-09-11
Payer: MEDICARE

## 2018-09-12 ENCOUNTER — HOSPITAL ENCOUNTER (OUTPATIENT)
Facility: HOSPITAL | Age: 68
Setting detail: OBSERVATION
Discharge: HOME HEALTH | End: 2018-09-13
Attending: SURGERY | Admitting: SURGERY
Payer: MEDICARE

## 2018-09-12 ENCOUNTER — HOSPITAL ENCOUNTER (OUTPATIENT)
Dept: NUCLEAR MEDICINE | Facility: HOSPITAL | Age: 68
Discharge: HOME | End: 2018-09-12
Attending: SURGERY
Payer: MEDICARE

## 2018-09-12 ENCOUNTER — CONVERSION ENCOUNTER (OUTPATIENT)
Dept: MAMMOGRAPHY | Facility: HOSPITAL | Age: 68
End: 2018-09-12

## 2018-09-12 ENCOUNTER — ANESTHESIA (OUTPATIENT)
Dept: PERIOP | Facility: HOSPITAL | Age: 68
End: 2018-09-12
Payer: MEDICARE

## 2018-09-12 DIAGNOSIS — D05.11 INTRADUCTAL CARCINOMA IN SITU OF RIGHT BREAST: ICD-10-CM

## 2018-09-12 DIAGNOSIS — D05.11 DUCTAL CARCINOMA IN SITU (DCIS) OF RIGHT BREAST: ICD-10-CM

## 2018-09-12 PROCEDURE — A9541 TC99M SULFUR COLLOID: HCPCS

## 2018-09-12 PROCEDURE — 19303 MAST SIMPLE COMPLETE: CPT | Performed by: SURGERY

## 2018-09-12 PROCEDURE — 88341 IMHCHEM/IMCYTCHM EA ADD ANTB: CPT | Performed by: PATHOLOGY

## 2018-09-12 PROCEDURE — 19357 TISS XPNDR PLMT BRST RCNSTJ: CPT | Performed by: PHYSICIAN ASSISTANT

## 2018-09-12 PROCEDURE — C1789 PROSTHESIS, BREAST, IMP: HCPCS | Performed by: SURGERY

## 2018-09-12 PROCEDURE — 88307 TISSUE EXAM BY PATHOLOGIST: CPT | Performed by: PATHOLOGY

## 2018-09-12 PROCEDURE — 88333 PATH CONSLTJ SURG CYTO XM 1: CPT | Performed by: PATHOLOGY

## 2018-09-12 PROCEDURE — 88302 TISSUE EXAM BY PATHOLOGIST: CPT | Performed by: PATHOLOGY

## 2018-09-12 PROCEDURE — C1781 MESH (IMPLANTABLE): HCPCS | Performed by: SURGERY

## 2018-09-12 PROCEDURE — 15777 ACELLULAR DERM MATRIX IMPLT: CPT | Performed by: SURGERY

## 2018-09-12 PROCEDURE — 88342 IMHCHEM/IMCYTCHM 1ST ANTB: CPT | Performed by: PATHOLOGY

## 2018-09-12 PROCEDURE — 38792 RA TRACER ID OF SENTINL NODE: CPT

## 2018-09-12 PROCEDURE — 19357 TISS XPNDR PLMT BRST RCNSTJ: CPT | Performed by: SURGERY

## 2018-09-12 DEVICE — ALLOMAX 8 X 16 CM: Type: IMPLANTABLE DEVICE | Site: BREAST | Status: FUNCTIONAL

## 2018-09-12 DEVICE — TEXTURED, HIGH PROFILE, SUTURE TABS, INTEGRAL INJECTION DOME, 300CC
Type: IMPLANTABLE DEVICE | Site: BREAST | Status: FUNCTIONAL
Brand: ARTOURA BREAST TISSUE EXPANDER

## 2018-09-12 RX ORDER — ROCURONIUM BROMIDE 10 MG/ML
INJECTION, SOLUTION INTRAVENOUS AS NEEDED
Status: DISCONTINUED | OUTPATIENT
Start: 2018-09-12 | End: 2018-09-12 | Stop reason: SURG

## 2018-09-12 RX ORDER — SULFAMETHOXAZOLE AND TRIMETHOPRIM 800; 160 MG/1; MG/1
1 TABLET ORAL EVERY 12 HOURS SCHEDULED
Qty: 60 TABLET | Refills: 0 | Status: SHIPPED | OUTPATIENT
Start: 2018-09-12 | End: 2018-10-12

## 2018-09-12 RX ORDER — FENTANYL CITRATE/PF 50 MCG/ML
25 SYRINGE (ML) INJECTION
Status: DISCONTINUED | OUTPATIENT
Start: 2018-09-12 | End: 2018-09-12 | Stop reason: HOSPADM

## 2018-09-12 RX ORDER — LORAZEPAM 2 MG/ML
0.5 INJECTION INTRAMUSCULAR ONCE
Status: COMPLETED | OUTPATIENT
Start: 2018-09-12 | End: 2018-09-12

## 2018-09-12 RX ORDER — LIDOCAINE HYDROCHLORIDE 10 MG/ML
INJECTION, SOLUTION INFILTRATION; PERINEURAL AS NEEDED
Status: DISCONTINUED | OUTPATIENT
Start: 2018-09-12 | End: 2018-09-12 | Stop reason: SURG

## 2018-09-12 RX ORDER — SODIUM CHLORIDE 9 MG/ML
INJECTION, SOLUTION INTRAVENOUS CONTINUOUS PRN
Status: DISCONTINUED | OUTPATIENT
Start: 2018-09-12 | End: 2018-09-12 | Stop reason: SURG

## 2018-09-12 RX ORDER — MAGNESIUM HYDROXIDE 1200 MG/15ML
LIQUID ORAL AS NEEDED
Status: DISCONTINUED | OUTPATIENT
Start: 2018-09-12 | End: 2018-09-12 | Stop reason: HOSPADM

## 2018-09-12 RX ORDER — BUPIVACAINE HYDROCHLORIDE AND EPINEPHRINE 2.5; 5 MG/ML; UG/ML
INJECTION, SOLUTION EPIDURAL; INFILTRATION; INTRACAUDAL; PERINEURAL AS NEEDED
Status: DISCONTINUED | OUTPATIENT
Start: 2018-09-12 | End: 2018-09-12 | Stop reason: HOSPADM

## 2018-09-12 RX ORDER — ONDANSETRON 2 MG/ML
INJECTION INTRAMUSCULAR; INTRAVENOUS AS NEEDED
Status: DISCONTINUED | OUTPATIENT
Start: 2018-09-12 | End: 2018-09-12 | Stop reason: SURG

## 2018-09-12 RX ORDER — ONDANSETRON 2 MG/ML
4 INJECTION INTRAMUSCULAR; INTRAVENOUS EVERY 6 HOURS PRN
Status: DISCONTINUED | OUTPATIENT
Start: 2018-09-12 | End: 2018-09-13 | Stop reason: HOSPADM

## 2018-09-12 RX ORDER — CEFAZOLIN SODIUM 1 G/3ML
INJECTION, POWDER, FOR SOLUTION INTRAMUSCULAR; INTRAVENOUS AS NEEDED
Status: DISCONTINUED | OUTPATIENT
Start: 2018-09-12 | End: 2018-09-12 | Stop reason: SURG

## 2018-09-12 RX ORDER — ONDANSETRON 2 MG/ML
4 INJECTION INTRAMUSCULAR; INTRAVENOUS ONCE AS NEEDED
Status: COMPLETED | OUTPATIENT
Start: 2018-09-12 | End: 2018-09-12

## 2018-09-12 RX ORDER — SODIUM CHLORIDE 9 MG/ML
20 INJECTION, SOLUTION INTRAVENOUS CONTINUOUS
Status: DISCONTINUED | OUTPATIENT
Start: 2018-09-12 | End: 2018-09-13 | Stop reason: HOSPADM

## 2018-09-12 RX ORDER — SULFAMETHOXAZOLE AND TRIMETHOPRIM 800; 160 MG/1; MG/1
1 TABLET ORAL EVERY 12 HOURS SCHEDULED
Status: DISCONTINUED | OUTPATIENT
Start: 2018-09-12 | End: 2018-09-13 | Stop reason: HOSPADM

## 2018-09-12 RX ORDER — SODIUM CHLORIDE 9 MG/ML
INJECTION, SOLUTION INTRAVENOUS AS NEEDED
Status: DISCONTINUED | OUTPATIENT
Start: 2018-09-12 | End: 2018-09-12 | Stop reason: HOSPADM

## 2018-09-12 RX ORDER — CALCIUM CARBONATE 200(500)MG
1000 TABLET,CHEWABLE ORAL DAILY PRN
Status: DISCONTINUED | OUTPATIENT
Start: 2018-09-12 | End: 2018-09-13 | Stop reason: HOSPADM

## 2018-09-12 RX ORDER — OXYCODONE HYDROCHLORIDE AND ACETAMINOPHEN 5; 325 MG/1; MG/1
1 TABLET ORAL EVERY 4 HOURS PRN
Qty: 18 TABLET | Refills: 0 | Status: SHIPPED | OUTPATIENT
Start: 2018-09-12 | End: 2019-01-02 | Stop reason: HOSPADM

## 2018-09-12 RX ORDER — MORPHINE SULFATE 2 MG/ML
1 INJECTION, SOLUTION INTRAMUSCULAR; INTRAVENOUS EVERY 4 HOURS PRN
Status: DISCONTINUED | OUTPATIENT
Start: 2018-09-12 | End: 2018-09-13 | Stop reason: HOSPADM

## 2018-09-12 RX ORDER — MIDAZOLAM HYDROCHLORIDE 1 MG/ML
INJECTION INTRAMUSCULAR; INTRAVENOUS AS NEEDED
Status: DISCONTINUED | OUTPATIENT
Start: 2018-09-12 | End: 2018-09-12 | Stop reason: SURG

## 2018-09-12 RX ORDER — DOCUSATE SODIUM 100 MG/1
100 CAPSULE, LIQUID FILLED ORAL 2 TIMES DAILY
Status: DISCONTINUED | OUTPATIENT
Start: 2018-09-12 | End: 2018-09-13 | Stop reason: HOSPADM

## 2018-09-12 RX ORDER — PROCHLORPERAZINE MALEATE 10 MG
5 TABLET ORAL EVERY 6 HOURS PRN
Status: DISCONTINUED | OUTPATIENT
Start: 2018-09-12 | End: 2018-09-13 | Stop reason: HOSPADM

## 2018-09-12 RX ORDER — FENTANYL CITRATE 50 UG/ML
INJECTION, SOLUTION INTRAMUSCULAR; INTRAVENOUS AS NEEDED
Status: DISCONTINUED | OUTPATIENT
Start: 2018-09-12 | End: 2018-09-12 | Stop reason: SURG

## 2018-09-12 RX ORDER — OXYCODONE HYDROCHLORIDE AND ACETAMINOPHEN 5; 325 MG/1; MG/1
1 TABLET ORAL EVERY 4 HOURS PRN
Status: DISCONTINUED | OUTPATIENT
Start: 2018-09-12 | End: 2018-09-13 | Stop reason: HOSPADM

## 2018-09-12 RX ORDER — LACTOBACILLUS ACIDOPHILUS / LACTOBACILLUS BULGARICUS 100 MILLION CFU STRENGTH
1 GRANULES ORAL
Status: DISCONTINUED | OUTPATIENT
Start: 2018-09-12 | End: 2018-09-13 | Stop reason: HOSPADM

## 2018-09-12 RX ORDER — MEPERIDINE HYDROCHLORIDE 25 MG/ML
12.5 INJECTION INTRAMUSCULAR; INTRAVENOUS; SUBCUTANEOUS
Status: DISCONTINUED | OUTPATIENT
Start: 2018-09-12 | End: 2018-09-12 | Stop reason: HOSPADM

## 2018-09-12 RX ORDER — METOCLOPRAMIDE HYDROCHLORIDE 5 MG/ML
INJECTION INTRAMUSCULAR; INTRAVENOUS AS NEEDED
Status: DISCONTINUED | OUTPATIENT
Start: 2018-09-12 | End: 2018-09-12 | Stop reason: SURG

## 2018-09-12 RX ORDER — BUPIVACAINE HYDROCHLORIDE 2.5 MG/ML
INJECTION, SOLUTION INFILTRATION; PERINEURAL AS NEEDED
Status: DISCONTINUED | OUTPATIENT
Start: 2018-09-12 | End: 2018-09-12 | Stop reason: HOSPADM

## 2018-09-12 RX ORDER — PROPOFOL 10 MG/ML
INJECTION, EMULSION INTRAVENOUS AS NEEDED
Status: DISCONTINUED | OUTPATIENT
Start: 2018-09-12 | End: 2018-09-12 | Stop reason: SURG

## 2018-09-12 RX ORDER — ACETAMINOPHEN 325 MG/1
650 TABLET ORAL EVERY 6 HOURS
Status: DISCONTINUED | OUTPATIENT
Start: 2018-09-12 | End: 2018-09-13 | Stop reason: HOSPADM

## 2018-09-12 RX ORDER — GLYCOPYRROLATE 0.2 MG/ML
INJECTION INTRAMUSCULAR; INTRAVENOUS AS NEEDED
Status: DISCONTINUED | OUTPATIENT
Start: 2018-09-12 | End: 2018-09-12 | Stop reason: SURG

## 2018-09-12 RX ORDER — METOCLOPRAMIDE HYDROCHLORIDE 5 MG/ML
10 INJECTION INTRAMUSCULAR; INTRAVENOUS ONCE AS NEEDED
Status: DISCONTINUED | OUTPATIENT
Start: 2018-09-12 | End: 2018-09-12 | Stop reason: HOSPADM

## 2018-09-12 RX ADMIN — CEFAZOLIN SODIUM 1000 MG: 1 SOLUTION INTRAVENOUS at 23:09

## 2018-09-12 RX ADMIN — FENTANYL CITRATE 50 MCG: 50 INJECTION INTRAMUSCULAR; INTRAVENOUS at 09:35

## 2018-09-12 RX ADMIN — ACETAMINOPHEN 650 MG: 325 TABLET, FILM COATED ORAL at 23:10

## 2018-09-12 RX ADMIN — FENTANYL CITRATE 25 MCG: 50 INJECTION INTRAMUSCULAR; INTRAVENOUS at 10:58

## 2018-09-12 RX ADMIN — GLYCOPYRROLATE 0.4 MG: 0.2 INJECTION, SOLUTION INTRAMUSCULAR; INTRAVENOUS at 10:25

## 2018-09-12 RX ADMIN — LORAZEPAM 0.5 MG: 2 INJECTION INTRAMUSCULAR; INTRAVENOUS at 11:28

## 2018-09-12 RX ADMIN — ONDANSETRON 4 MG: 2 INJECTION INTRAMUSCULAR; INTRAVENOUS at 10:19

## 2018-09-12 RX ADMIN — PROCHLORPERAZINE MALEATE 5 MG: 10 TABLET, FILM COATED ORAL at 16:18

## 2018-09-12 RX ADMIN — MORPHINE SULFATE 1 MG: 2 INJECTION, SOLUTION INTRAMUSCULAR; INTRAVENOUS at 13:39

## 2018-09-12 RX ADMIN — ROCURONIUM BROMIDE 50 MG: 10 INJECTION INTRAVENOUS at 07:44

## 2018-09-12 RX ADMIN — FENTANYL CITRATE 25 MCG: 50 INJECTION INTRAMUSCULAR; INTRAVENOUS at 11:51

## 2018-09-12 RX ADMIN — PROPOFOL 200 MG: 10 INJECTION, EMULSION INTRAVENOUS at 07:44

## 2018-09-12 RX ADMIN — METOCLOPRAMIDE HYDROCHLORIDE 5 MG: 5 INJECTION INTRAMUSCULAR; INTRAVENOUS at 10:19

## 2018-09-12 RX ADMIN — ONDANSETRON 4 MG: 2 INJECTION INTRAMUSCULAR; INTRAVENOUS at 19:30

## 2018-09-12 RX ADMIN — LIDOCAINE HYDROCHLORIDE 50 MG: 10 INJECTION, SOLUTION INFILTRATION; PERINEURAL at 07:44

## 2018-09-12 RX ADMIN — CEFAZOLIN SODIUM 1000 MG: 1 INJECTION, POWDER, FOR SOLUTION INTRAMUSCULAR; INTRAVENOUS at 08:09

## 2018-09-12 RX ADMIN — DEXAMETHASONE SODIUM PHOSPHATE 10 MG: 10 INJECTION INTRAMUSCULAR; INTRAVENOUS at 07:49

## 2018-09-12 RX ADMIN — SULFAMETHOXAZOLE AND TRIMETHOPRIM 1 TABLET: 800; 160 TABLET ORAL at 23:09

## 2018-09-12 RX ADMIN — ONDANSETRON 4 MG: 2 INJECTION INTRAMUSCULAR; INTRAVENOUS at 13:02

## 2018-09-12 RX ADMIN — ROCURONIUM BROMIDE 10 MG: 10 INJECTION INTRAVENOUS at 09:22

## 2018-09-12 RX ADMIN — HYDROMORPHONE HYDROCHLORIDE 1 MG: 1 INJECTION, SOLUTION INTRAMUSCULAR; INTRAVENOUS; SUBCUTANEOUS at 16:18

## 2018-09-12 RX ADMIN — OXYCODONE HYDROCHLORIDE AND ACETAMINOPHEN 1 TABLET: 5; 325 TABLET ORAL at 23:10

## 2018-09-12 RX ADMIN — FENTANYL CITRATE 100 MCG: 50 INJECTION INTRAMUSCULAR; INTRAVENOUS at 07:44

## 2018-09-12 RX ADMIN — SODIUM CHLORIDE 20 ML/HR: 0.9 INJECTION, SOLUTION INTRAVENOUS at 11:53

## 2018-09-12 RX ADMIN — FENTANYL CITRATE 25 MCG: 50 INJECTION INTRAMUSCULAR; INTRAVENOUS at 11:03

## 2018-09-12 RX ADMIN — MIDAZOLAM HYDROCHLORIDE 2 MG: 1 INJECTION, SOLUTION INTRAMUSCULAR; INTRAVENOUS at 07:35

## 2018-09-12 RX ADMIN — FENTANYL CITRATE 25 MCG: 50 INJECTION INTRAMUSCULAR; INTRAVENOUS at 11:30

## 2018-09-12 RX ADMIN — SODIUM CHLORIDE: 0.9 INJECTION, SOLUTION INTRAVENOUS at 07:00

## 2018-09-12 RX ADMIN — ROCURONIUM BROMIDE 10 MG: 10 INJECTION INTRAVENOUS at 09:15

## 2018-09-12 RX ADMIN — FENTANYL CITRATE 25 MCG: 50 INJECTION INTRAMUSCULAR; INTRAVENOUS at 11:12

## 2018-09-12 RX ADMIN — FENTANYL CITRATE 25 MCG: 50 INJECTION INTRAMUSCULAR; INTRAVENOUS at 12:03

## 2018-09-12 RX ADMIN — PROCHLORPERAZINE MALEATE 5 MG: 10 TABLET, FILM COATED ORAL at 23:15

## 2018-09-12 RX ADMIN — NEOSTIGMINE METHYLSULFATE 3 MG: 1 INJECTION, SOLUTION INTRAMUSCULAR; INTRAVENOUS; SUBCUTANEOUS at 10:25

## 2018-09-12 RX ADMIN — ONDANSETRON 4 MG: 2 INJECTION INTRAMUSCULAR; INTRAVENOUS at 10:54

## 2018-09-12 RX ADMIN — SODIUM CHLORIDE: 0.9 INJECTION, SOLUTION INTRAVENOUS at 09:33

## 2018-09-12 RX ADMIN — CEFAZOLIN SODIUM 1000 MG: 1 SOLUTION INTRAVENOUS at 16:25

## 2018-09-12 NOTE — INTERIM OP NOTE
BREAST MASTECTOMY; LYMPHATIC MAPPING WITH BLUE DYE AND RADIOACTIVE DYE; SENTINEL LYMPH NODE BIOPSY (INJECT AT 0730 BY DR BALL IN THE OR)  Postoperative Note  PATIENT NAME: Diana Daly  : 1950  MRN: [id number]  AN OR ROOM 02    Surgery Date: 2018    Preop Diagnosis:  Ductal carcinoma in situ (DCIS) of right breast [D05 11]    Post-Op Diagnosis Codes:     * Ductal carcinoma in situ (DCIS) of right breast [D05 11]    Procedure(s) (LRB):  BREAST MASTECTOMY; LYMPHATIC MAPPING WITH BLUE DYE AND RADIOACTIVE DYE; SENTINEL LYMPH NODE BIOPSY (INJECT AT 0730 BY DR BALL IN THE OR) (Right)  BREAST IMMEDIATE RECONSTRUCTION, TISSUE EXPANDER (Right)  ACELLULAR DERMAL MATRIX (Right)    Surgeon(s) and Role:  Panel 1:     * Marcy Mina MD - Primary     * Silver Collier MD - Assisting    Panel 2:     * Raman Arreola MD - Primary     * Mirella Love PA-C - Assisting    Specimens:  ID Type Source Tests Collected by Time Destination   1 : Right axilla Tissue Lymph Node, [place] TISSUE EXAM Marcy Mina MD 2018 8149    2 : Right breast  suture marks long lateral/ short superior/ medium medial Tissue Breast, Right TISSUE EXAM Marcy Mina MD 2018 5231    3 : Right masectomy wound margin Tissue Breast, Right TISSUE EXAM Raman Arreola MD 2018 1017        Estimated Blood Loss:   50 mL    Anesthesia Type:   General     Findings:    None  Complications:   None    SIGNATURE: Mirella Love PA-C   DATE: 2018   TIME: 10:36 AM

## 2018-09-12 NOTE — OP NOTE
OPERATIVE REPORT  PATIENT NAME: Kan Lance Walk    :  1950  MRN: [id number]  Pt Location: AN OR ROOM 02    SURGERY DATE: 2018    Surgeon(s) and Role:  Panel 1:     * Adelaida Root MD - Primary     * Aviva Baxter MD - Assisting    Panel 2:     * Rylie Hernandez MD - Primary     * Scotty Diaz PA-C - Assisting    Preop Diagnosis:  Ductal carcinoma in situ (DCIS) of right breast [D05 11]    Post-Op Diagnosis Codes:     * Ductal carcinoma in situ (DCIS) of right breast [D05 11]    Procedure:  Immediate reconstruction right breast with tissue expander and acellular dermal matrix 8 cm x 6 cm  Specimen(s):  ID Type Source Tests Collected by Time Destination   1 : Right axilla Tissue Lymph Node, [place] TISSUE EXAM Adelaida Root MD 2018 3960    2 : Right breast  suture marks long lateral/ short superior/ medium medial Tissue Breast, Right TISSUE EXAM Adelaida Root MD 2018 4670    3 : Right masectomy wound margin Tissue Breast, Right TISSUE Jason Lucas MD 2018 1017        Estimated Blood Loss:   50 mL    Drains:  Closed/Suction Drain Right Breast Bulb 15 Fr  (Active)   Site Description Unable to view 2018 12:00 PM   Dressing Status Clean;Dry; Intact 2018 12:00 PM   Drainage Appearance Bloody 2018 12:00 PM   Status To bulb suction 2018 12:00 PM   Output (mL) 20 mL 2018 12:00 PM   Number of days: 0       Closed/Suction Drain Right Breast Bulb 15 Fr  (Active)   Site Description Unable to view 2018 12:00 PM   Dressing Status Clean;Dry; Intact 2018 12:00 PM   Drainage Appearance Bloody 2018 12:00 PM   Status To bulb suction 2018 12:00 PM   Output (mL) 30 mL 2018 12:00 PM   Number of days: 0       [REMOVED] Urethral Catheter Double-lumen 16 Fr   (Removed)   Number of days: 0       Anesthesia Type:   General    Operative Indications:  Ductal carcinoma in situ (DCIS) of right breast [D05 11]  Status post mastectomy    Operative Findings:  As above    Complications:   None    Procedure and Technique:  Patient was seen the day prior to surgery, at that time she was marked in anticipation of immediate reconstruction of the right breast utilizing a tissue expander and acellular dermis  The day of surgery, following the mastectomy, we were summoned to the room for the reconstruction  The operative site was re-prepped and draped in sterile fashion  A proper time-out was performed  The wound was thoroughly irrigated with antibiotic solution and hemostasis assured with the Bovie cautery  The lateral border the pectoralis major muscle was then identified, it was elevated up off the chest wall utilizing a lighted mammary retractor and the Bovie cautery  The pectoralis was released from its inferior attachments along the ribs in the lower portion of the sternum, perforating vessels were clamped and cauterized prior to dividing them  A subpectoral pocket was created and the wound was thoroughly irrigated  Hemostasis was adequate  An 8 x 6 cm sheet of aloe max acellular dermal matrix was then hydrated on the back table in antibiotic solution  It was inset into the wound, trimmed to the contours of the lateral inframammary crease, and it was inset into the lateral mammary fold and inframammary fold utilizing multiple interrupted 2-0 Vicryl sutures  It was also secured to the superolateral and inferomedial pectoralis utilizing 2-0 Vicryl sutures  The pocket was again irrigated with antibiotic solution  Hemostasis was assured  Two hundred fifteen Wallisian drains were then placed, 1 deep to the pectoralis and acellular dermis, the 2nd in the subcutaneous plane  Both were secured with 3-0 nylon sutures  Following this the site was re-prepped and draped, the instruments were wiped down with antibiotic solution and surgical team's gloves were changed    A mentor 4 Víctor tissue expander, 300 cc nominal volume, was prepared on the back table, soaked in antibiotic solution, and placed deep to the pectoralis and acellular dermis  It was then secured at the 6 o'clock suture tab utilizing 2-0 Vicryl sutures  The interface between the expander and acellular dermis was closed over the expander used by Sheela 2-0 Quill sutures in a figure-of-eight fashion  50 cc aliquots of saline were then utilized to fill the expander, a total of 150 cc was placed  The mastectomy wound margins were then sharply sharply excised to remove the cautery and retraction artifact, closure was undertaken with 2-0 and 3-0 Vicryl sutures  At the level of the deep dermis, this was followed by a running subcuticular 4-0 strata fix  Dry sterile dressings were applied the patient was transferred to the recovery room     I was present for the entire procedure and A qualified resident physician was not available    Patient Disposition:  PACU     SIGNATURE: Khai Arguelles MD  DATE: September 12, 2018  TIME: 2:31 PM

## 2018-09-12 NOTE — OP NOTE
OPERATIVE REPORT  PATIENT NAME: Luz Daly    :  1950  MRN: [id number]  Pt Location: AN OR ROOM 02    SURGERY DATE: 2018    Surgeon(s) and Role:  Panel 1:     * Tanya Hutton MD - Primary     * Lorne Singh MD - Assisting    Panel 2:     * Ivan Allen MD - Primary    Preop Diagnosis:  Ductal carcinoma in situ (DCIS) of right breast [D05 11]    Post-Op Diagnosis Codes:     * Ductal carcinoma in situ (DCIS) of right breast [D05 11]    Procedure(s) (LRB):  BREAST MASTECTOMY; LYMPHATIC MAPPING WITH BLUE DYE AND RADIOACTIVE DYE; SENTINEL LYMPH NODE BIOPSY (INJECT AT 0730 BY DR BALL IN THE OR) (Right)  BREAST IMMEDIATE RECONSTRUCTION, TISSUE EXPANDER (Right)  ACELLULAR DERMAL MATRIX (Right)    Specimen(s):  ID Type Source Tests Collected by Time Destination   1 : Right axilla Tissue Lymph Node, [place] TISSUE EXAM Tanya Hutton MD 2018 0322    2 : Right breast  suture marks long lateral/ short superior/ medium medial Tissue Breast, Right TISSUE EXAM Tanya Hutton MD 2018 0846        Estimated Blood Loss:   Minimal    Drains:  Urethral Catheter Double-lumen 16 Fr   (Active)   Number of days: 0       Anesthesia Type:   General    Operative Indications:  Ductal carcinoma in situ (DCIS) of right breast [D05 11]      Operative Findings:  sln blue, hot (1500 cpm) negative on touch prep, grossly normal    Complications:   None    Procedure and Technique:  The patient was brought to the operation room and placed under general anesthesia   Attention was paid to ensure appropriate padding and correct positioning   The right breast was injected intradermally with 0 3cc of methylene blue and 0 5 mCi of Technetium 2 and then prepped and draped in a sterile fashion   I initiated a time-out, identifying the patient, the correct side and the above procedure   All parties agreed with the time out      The planned incision was then injected with 0 25% Marcaine and epinephrine for local anesthesia   The incision was sharply incised   Thin flaps were then elevated using electrocautery starting superiorly and then continuing medially, inferiorly and finally laterally   The tail of Kevon was then dissected away from the axilla proper leaving the breast tethered to the underlying musculature       The sentinel lymph node was identified and removed  The node was blue, hot  There were no other radioactive, blue or enlarged lymph nodes in the axilla  The sentinel lymph node was sent to pathology and there was no microscopic evidence of metastasis  The breast was then removed from the muscles in a sub-facial plane   The breast was oriented with sutures (short=superior; medium=medial; long=lateral)  The breast was sent to pathology in formalin for permanent pathologic evaluation  Meticulous hemostasis was maintained with electrocautery   The wound was extensively irrigated  Dr Debi Palomares entered the room to initiate reconstruction  The counts were correct x2       I was present for the entire procedure    Patient Disposition:  PACU     SIGNATURE: Augusto Medina MD  DATE: September 12, 2018  TIME: 8:54 AM

## 2018-09-12 NOTE — H&P (VIEW-ONLY)
Surgical Oncology Follow Up       [address] [place] [address]  [facility] [address] [address]    Carlos Luis Walk  1950  [id number]  [address]  CANCER [facility] SURGICAL ONCOLOGY [place] [id number]    Chief Complaint   Patient presents with    Pre-op Exam     pre op consent  Right mastectomy with immediate reconstruction with Dr Mayelin Farr:   Patient presents after seeing Dr Tracey Quan for her relatively extensive ductal carcinoma in situ on the right side  We went through the process of informed consent for right mastectomy in conjunction with a sentinel lymph node biopsy and possible axillary dissection  This was reviewed in detail with her  All questions were answered the patient's satisfaction  Cancer History:        Ductal carcinoma in situ (DCIS) of right breast    7/17/2018 Biopsy     Right breast biopsy  Upper inner breast  DCIS  Grade 3  ER 0  NC 0  Stage 0              Interval History:   See above    Review of Systems:   Review of Systems   Constitutional: Negative for activity change, appetite change and fatigue  HENT: Negative  Eyes: Negative  Respiratory: Negative for cough, shortness of breath and wheezing  Cardiovascular: Negative for chest pain and leg swelling  Gastrointestinal: Negative  Endocrine: Negative  Genitourinary: Negative  Musculoskeletal:        No new changes or complaints of bone pain   Skin: Negative  Allergic/Immunologic: Negative  Neurological: Negative  Hematological: Negative  Psychiatric/Behavioral: Negative          Past Medical History     Patient Active Problem List   Diagnosis    Menopause    Tick bite of back    Gynecologic exam normal    Ductal carcinoma in situ (DCIS) of right breast     Past Medical History:   Diagnosis Date    Childhood asthma      No past surgical history on file   Family History   Problem Relation Age of Onset    Hypertension Father     Prostate cancer Father     Melanoma Sister      Social History     Social History    Marital status: Single     Spouse name: N/A    Number of children: N/A    Years of education: N/A     Occupational History    Not on file  Social History Main Topics    Smoking status: Never Smoker    Smokeless tobacco: Never Used    Alcohol use Yes      Comment: 2 drinks/mo    Drug use: No    Sexual activity: Yes     Partners: Male     Birth control/ protection: Post-menopausal     Other Topics Concern    Not on file     Social History Narrative    No narrative on file       Current Outpatient Prescriptions:     Probiotic Product (PROBIOTIC-10 PO), Take by mouth, Disp: , Rfl:   No Known Allergies    Physical Exam:     Vitals:    08/15/18 1202   BP: 166/84   Pulse: 80   Resp: 12   Temp: 99 °F (37 2 °C)     Physical Exam   Pulmonary/Chest:   Examination of the right breast demonstrates ecchymosis as well as a dominant mass just superior to the nipple-areolar complex  This is most likely secondary to hematoma if this is not resolved this area of the skin would most likely be resected at the time of surgery to minimize the chance of a positive skin margin  Results:             Advance Care Planning/Advance Directives:  I discussed the disease status, treatment plans and follow-up with the patient

## 2018-09-12 NOTE — ANESTHESIA PREPROCEDURE EVALUATION
Review of Systems/Medical History  Patient summary reviewed    No history of anesthetic complications     Cardiovascular  EKG reviewed, Negative cardio ROS Exercise tolerance (METS): >4,     Pulmonary  Asthma , well controlled/ stable ,        GI/Hepatic  Negative GI/hepatic ROS          Negative  ROS        Endo/Other  Negative endo/other ROS      GYN    Breast cancer        Hematology  Negative hematology ROS      Musculoskeletal  Negative musculoskeletal ROS        Neurology  Negative neurology ROS      Psychology   Negative psychology ROS              Physical Exam    Airway    Mallampati score: II  TM Distance: <3 FB  Neck ROM: full     Dental   No notable dental hx     Cardiovascular  Comment: Negative ROS, Cardiovascular exam normal    Pulmonary  Pulmonary exam normal     Other Findings        Anesthesia Plan  ASA Score- 2     Anesthesia Type- general with ASA Monitors  Additional Monitors:   Airway Plan: ETT  Plan Factors-  Patient did not smoke on day of surgery  Induction-     Postoperative Plan- Plan for postoperative opioid use  Planned trial extubation    Informed Consent- Anesthetic plan and risks discussed with patient  I personally reviewed this patient with the CRNA  Discussed and agreed on the Anesthesia Plan with the CRNA  Sadie Novoa

## 2018-09-12 NOTE — ANESTHESIA POSTPROCEDURE EVALUATION
Post-Op Assessment Note      CV Status:  Stable    Mental Status:  Alert and awake    Hydration Status:  Euvolemic    PONV Controlled:  Controlled    Airway Patency:  Patent    Post Op Vitals Reviewed: Yes          Staff: CRNA           BP   120/74   Temp  97 2   Pulse  76   Resp   18   SpO2   100

## 2018-09-12 NOTE — DISCHARGE INSTRUCTIONS
[facility]  Dr Derrek Deleon   [address] [facility], [address]  Phone: [phone]     Postoperative Instructions for Outpatient Surgery     These instructions are being provided by your doctor to give you basic guidelines during your post-op recovery  Please let our office know if your contact information has changed       Please call the office today for an appointment on Friday 9/14 for postoperative care      Dressings:  Keep dressings clean and dry until seen in the office      Activity Restrictions:  Nothing strenuous      Bathing:  No bathing until seen in the office      Medications:    Resume pre-op medications  You may take tylenol, aleve, or ibuprofen for pain control             Percocet as needed for pain  Other:  May use cold compresses to right chest as needed for pain and swelling  Track and record drain output over 24 hr for each drain

## 2018-09-12 NOTE — PLAN OF CARE
GASTROINTESTINAL - ADULT     Minimal or absence of nausea and/or vomiting Progressing     Maintains or returns to baseline bowel function Progressing     Maintains adequate nutritional intake Progressing        SKIN/TISSUE INTEGRITY - ADULT     Skin integrity remains intact Progressing     Incision(s), wounds(s) or drain site(s) healing without S/S of infection Progressing     Oral mucous membranes remain intact Progressing

## 2018-09-13 VITALS
SYSTOLIC BLOOD PRESSURE: 121 MMHG | TEMPERATURE: 99.3 F | WEIGHT: 114 LBS | OXYGEN SATURATION: 97 % | RESPIRATION RATE: 18 BRPM | HEIGHT: 65 IN | BODY MASS INDEX: 18.99 KG/M2 | DIASTOLIC BLOOD PRESSURE: 58 MMHG | HEART RATE: 82 BPM

## 2018-09-13 LAB
ANION GAP SERPL CALCULATED.3IONS-SCNC: 8 MMOL/L (ref 4–13)
BUN SERPL-MCNC: 14 MG/DL (ref 5–25)
CALCIUM SERPL-MCNC: 8.3 MG/DL (ref 8.3–10.1)
CHLORIDE SERPL-SCNC: 102 MMOL/L (ref 100–108)
CO2 SERPL-SCNC: 25 MMOL/L (ref 21–32)
CREAT SERPL-MCNC: 0.64 MG/DL (ref 0.6–1.3)
ERYTHROCYTE [DISTWIDTH] IN BLOOD BY AUTOMATED COUNT: 12.2 % (ref 11.6–15.1)
GFR SERPL CREATININE-BSD FRML MDRD: 93 ML/MIN/1.73SQ M
GLUCOSE SERPL-MCNC: 119 MG/DL (ref 65–140)
HCT VFR BLD AUTO: 34.3 % (ref 34.8–46.1)
HGB BLD-MCNC: 11.5 G/DL (ref 11.5–15.4)
MCH RBC QN AUTO: 30.6 PG (ref 26.8–34.3)
MCHC RBC AUTO-ENTMCNC: 33.5 G/DL (ref 31.4–37.4)
MCV RBC AUTO: 91 FL (ref 82–98)
PLATELET # BLD AUTO: 177 THOUSANDS/UL (ref 149–390)
PMV BLD AUTO: 10.1 FL (ref 8.9–12.7)
POTASSIUM SERPL-SCNC: 4 MMOL/L (ref 3.5–5.3)
RBC # BLD AUTO: 3.76 MILLION/UL (ref 3.81–5.12)
SODIUM SERPL-SCNC: 135 MMOL/L (ref 136–145)
WBC # BLD AUTO: 10 THOUSAND/UL (ref 4.31–10.16)

## 2018-09-13 PROCEDURE — 99024 POSTOP FOLLOW-UP VISIT: CPT | Performed by: SURGERY

## 2018-09-13 PROCEDURE — 80048 BASIC METABOLIC PNL TOTAL CA: CPT | Performed by: PHYSICIAN ASSISTANT

## 2018-09-13 PROCEDURE — 85027 COMPLETE CBC AUTOMATED: CPT | Performed by: PHYSICIAN ASSISTANT

## 2018-09-13 RX ORDER — MECLIZINE HYDROCHLORIDE 25 MG/1
25 TABLET ORAL EVERY 8 HOURS PRN
Status: DISCONTINUED | OUTPATIENT
Start: 2018-09-13 | End: 2018-09-13 | Stop reason: HOSPADM

## 2018-09-13 RX ORDER — METOCLOPRAMIDE HYDROCHLORIDE 5 MG/ML
10 INJECTION INTRAMUSCULAR; INTRAVENOUS EVERY 6 HOURS PRN
Status: DISCONTINUED | OUTPATIENT
Start: 2018-09-13 | End: 2018-09-13 | Stop reason: HOSPADM

## 2018-09-13 RX ORDER — SCOLOPAMINE TRANSDERMAL SYSTEM 1 MG/1
1 PATCH, EXTENDED RELEASE TRANSDERMAL
Status: DISCONTINUED | OUTPATIENT
Start: 2018-09-13 | End: 2018-09-13 | Stop reason: HOSPADM

## 2018-09-13 RX ADMIN — SODIUM CHLORIDE 20 ML/HR: 0.9 INJECTION, SOLUTION INTRAVENOUS at 07:23

## 2018-09-13 RX ADMIN — SCOPALAMINE 1 PATCH: 1 PATCH, EXTENDED RELEASE TRANSDERMAL at 07:38

## 2018-09-13 RX ADMIN — METOCLOPRAMIDE 10 MG: 5 INJECTION, SOLUTION INTRAMUSCULAR; INTRAVENOUS at 07:38

## 2018-09-13 RX ADMIN — ONDANSETRON 4 MG: 2 INJECTION INTRAMUSCULAR; INTRAVENOUS at 02:49

## 2018-09-13 RX ADMIN — ACETAMINOPHEN 650 MG: 325 TABLET, FILM COATED ORAL at 16:31

## 2018-09-13 RX ADMIN — PROCHLORPERAZINE MALEATE 5 MG: 10 TABLET, FILM COATED ORAL at 17:22

## 2018-09-13 RX ADMIN — MECLIZINE HYDROCHLORIDE 25 MG: 25 TABLET ORAL at 08:49

## 2018-09-13 RX ADMIN — ACETAMINOPHEN 650 MG: 325 TABLET, FILM COATED ORAL at 04:39

## 2018-09-13 RX ADMIN — ONDANSETRON 4 MG: 2 INJECTION INTRAMUSCULAR; INTRAVENOUS at 12:39

## 2018-09-13 NOTE — PLAN OF CARE
Problem: DISCHARGE PLANNING - CARE MANAGEMENT  Goal: Discharge to post-acute care or home with appropriate resources  INTERVENTIONS:  - Conduct assessment to determine patient/family and health care team treatment goals, and need for post-acute services based on payer coverage, community resources, and patient preferences, and barriers to discharge  - Address psychosocial, clinical, and financial barriers to discharge as identified in assessment in conjunction with the patient/family and health care team  - Arrange appropriate level of post-acute services according to patients   needs and preference and payer coverage in collaboration with the physician and health care team  - Communicate with and update the patient/family, physician, and health care team regarding progress on the discharge plan  - Arrange appropriate transportation to post-acute venues   Outcome: Progressing  CM spoke with patient at bedside  Patient would like VNA services at home  Patient is agreeable to [facility]  CM sent referral for VNA  All questions/concerns answered at bedside  CM will f/u with patient  CM name and role reviewed  Discharge Checklist reviewed and CM will continue to monitor for progress toward discharge goals in nursing and provider rounds

## 2018-09-13 NOTE — CASE MANAGEMENT
Initial Clinical Review    Age/Sex: 79 y o  female    Surgery Date: 9/12/2018    Procedure: BREAST MASTECTOMY; LYMPHATIC MAPPING WITH BLUE DYE AND RADIOACTIVE DYE; SENTINEL LYMPH NODE BIOPSY (INJECT AT 0730 BY DR BALL IN THE OR) (Right)  BREAST IMMEDIATE RECONSTRUCTION, TISSUE EXPANDER (Right)  ACELLULAR DERMAL MATRIX (Right)    Anesthesia: general     Admission Orders: Date/Time/Statement: 9/12/2018  1221 Outpatient no charge bed AND CHANGED TO OBSERVATION 9/13/2018 RE; patient with nausea and vomiting requiring IV antiemetics for control  Start   Ordered   09/13/18 0900  Place in Observation Once     Transfer Service: Oncology-Surgical       Question Answer Comment   Admitting Physician OBDULIA BALL    Level of Care Med Surg        09/13/18 0859         Vital Signs: /58 (BP Location: Left arm)   Pulse 97   Temp 98 2 °F (36 8 °C) (Oral)   Resp 18   Ht 5' 5" (1 651 m)   Wt 51 7 kg (114 lb)   LMP 01/01/2000 (Within Months)   SpO2 96%   BMI 18 97 kg/m²     Diet:        Diet Orders            Start     Ordered    09/12/18 1259  Room Service  Once     Question:  Type of Service  Answer:  Room Service-Appropriate    09/12/18 1259    09/12/18 1040  Diet Regular; Regular House  Diet effective now     Question Answer Comment   Diet Type Regular    Regular Regular House    RD to adjust diet per protocol? Yes        09/12/18 1045          Mobility: ambulate    DVT Prophylaxis: scds    Pain Control: tylenol   Dilaudid 1 mg iv  Morphine 1 mg iv - used x 1 (5019)  Pain Medications             oxyCODONE-acetaminophen (PERCOCET) 5-325 mg per tablet Take 1 tablet by mouth every 4 (four) hours as needed for moderate pain for up to 18 doses Max Daily Amount: 6 tablets        Scopolamine   Po medication- colace  Bactrim DS  Floranex  Compazine - used x 2     Prn - Antivert - used x1 on 9/13  IVF @ 20 cc/h  Surgical drains to bulb suction  Ancef 1 gm iv x 2    zofran 4 mg iv - used x 4 (1054; 1302; 1930; [phone])

## 2018-09-13 NOTE — SOCIAL WORK
CM spoke with patient at bedside  Patient would like VNA services at home  Patient is agreeable to [facility]  CM sent referral for VNA  All questions/concerns answered at bedside  CM will f/u with patient  CM name and role reviewed  Discharge Checklist reviewed and CM will continue to monitor for progress toward discharge goals in nursing and provider rounds

## 2018-09-13 NOTE — PHYSICIAN ADVISOR
Current patient class: Observation  The patient is currently on Hospital Day: 2 at [address] [facility]      This patient was originally admitted to the hospital under observation status  After admission the patient was reevaluated and determined to require further hospitalization  The patient is now documented to require at least a 2nd midnight in the hospital  As such the patient is now expected to satisfy the 2 midnight benchmark and is therefore eligible for inpatient admission  After review of the relevant documentation, labs, vital signs and test results, the patient is appropriate for INPATIENT ADMISSION (see below)  Rationale is as follows: The patient is a 31-year-old female who presented to the hospital on September 12, 2018 for mastectomy with immediate reconstruction right breast   This was an outpatient procedure  She was changed from outpatient status to observation level care as she required ongoing hospitalization due to persistent nausea and vomiting  She has two MYRIAM drains with serosanguineous output  The patient received two doses of intravenous Zofran today in addition to intravenous Reglan and meclizine  She will be reassessed by the surgical team this afternoon  If the patient is unable to tolerate adequate oral intake and has persistent nausea or vomiting requiring ongoing hospital care, then change in status to inpatient level care becomes warranted  I discussed this with UM  If the patient has symptoms improvement and stabilizes for discharge today, then keep observation level care      The patients vitals on arrival were ED Triage Vitals   Temperature Pulse Respirations Blood Pressure SpO2   09/12/18 0653 09/12/18 0653 09/12/18 0653 09/12/18 0653 09/12/18 0653   (!) 97 2 °F (36 2 °C) 82 18 165/80 99 %      Temp Source Heart Rate Source Patient Position - Orthostatic VS BP Location FiO2 (%)   09/12/18 0653 -- 09/12/18 1330 09/12/18 1330 --   Temporal Lying Left arm       Pain Score       09/12/18 1058       5           Past Medical History:   Diagnosis Date    Childhood asthma     as a child     Past Surgical History:   Procedure Laterality Date    MASTECTOMY W/ SENTINEL NODE BIOPSY Right 9/12/2018    Procedure: BREAST MASTECTOMY; LYMPHATIC MAPPING WITH BLUE DYE AND RADIOACTIVE DYE; SENTINEL LYMPH NODE BIOPSY (INJECT AT 0730 BY DR BALL IN THE OR);   Surgeon: Mira White MD;  [facility];  Service: Surgical Oncology    PA BREAST Theodora Jurado Right 9/12/2018    Procedure: BREAST IMMEDIATE RECONSTRUCTION, TISSUE EXPANDER;  Surgeon: Doris Franco MD;  [facility];  Service: Plastics    PA IMPLNT BIO IMPLNT FOR SOFT TISSUE REINFORCEMENT Right 9/12/2018    Procedure: ACELLULAR DERMAL MATRIX;  Surgeon: Doris Franco MD;  [facility];  Service: Plastics    WISDOM TOOTH EXTRACTION         The patient was admitted to the hospital at N/A on N/A for the following diagnosis:  Ductal carcinoma in situ (DCIS) of right breast [D05 11]    Consults have been placed to:   IP CONSULT TO CASE MANAGEMENT    Vitals:    09/12/18 2110 09/12/18 2216 09/13/18 0751 09/13/18 1123   BP:  135/69 120/58 120/57   BP Location:  Left arm Left arm Left arm   Pulse:  80 97 80   Resp:  18 18 18   Temp: 98 1 °F (36 7 °C) 98 1 °F (36 7 °C) 98 2 °F (36 8 °C) 98 1 °F (36 7 °C)   TempSrc: Oral Oral Oral Oral   SpO2:  98% 96% 98%   Weight:       Height:           Most recent labs:    Recent Labs      09/13/18   0449   WBC  10 00   HGB  11 5   HCT  34 3*   PLT  177   K  4 0   NA  135*   CALCIUM  8 3   BUN  14   CREATININE  0 64       Scheduled Meds:  Current Facility-Administered Medications:  acetaminophen 650 mg Oral Q6H Kusum Joe PA-C    calcium carbonate 1,000 mg Oral Daily PRN Lemon JANE Real    docusate sodium 100 mg Oral BID Lemon JANE Real    lactobacillus acidophilus-bulgaricus 1 packet Oral TID With Meals Wilmer VILLAFANA Noreen Weiner PA-C    meclizine 25 mg Oral Q8H PRN Amee Blanco PA-C    metoclopramide 10 mg Intravenous Q6H PRN Azra Guevara PA-C    morphine injection 1 mg Intravenous Q4H PRN Bhumika Daniels PA-C    ondansetron 4 mg Intravenous Q6H PRN Bhumika Daniels PA-C    oxyCODONE-acetaminophen 1 tablet Oral Q4H PRN Bhumika Daniels PA-C    prochlorperazine 5 mg Oral Q6H PRN Azra Guevara PA-C    scopolamine 1 patch Transdermal Q72H Azra Guevara PA-C    sodium chloride 20 mL/hr Intravenous Continuous Ardena Sameer, CRNA Last Rate: 20 mL/hr (09/13/18 0723)   sulfamethoxazole-trimethoprim 1 tablet Oral Q12H [facility] Kusum Joe PA-C      Continuous Infusions:  sodium chloride 20 mL/hr Last Rate: 20 mL/hr (09/13/18 0723)     PRN Meds: calcium carbonate    meclizine    metoclopramide    morphine injection    ondansetron    oxyCODONE-acetaminophen    prochlorperazine    Surgical procedures (if appropriate):  Procedure(s):  BREAST MASTECTOMY; LYMPHATIC MAPPING WITH BLUE DYE AND RADIOACTIVE DYE; SENTINEL LYMPH NODE BIOPSY (INJECT AT 0730 BY DR BALL IN THE OR)  BREAST IMMEDIATE RECONSTRUCTION, TISSUE EXPANDER  ACELLULAR DERMAL MATRIX

## 2018-09-13 NOTE — PROGRESS NOTES
Progress Note -Surgery JUNO Angeles Walk 79 y o  female MRN: [id number]  Unit/Bed#: -01 Encounter: [id number]      Subjective/Objective     Subjective: Pt is very nauseous this AM  She vomited multiple times yesterday  Pain controled  MARYCARMEN drain x 2 serosanguinous output       Objective:     /58 (BP Location: Left arm)   Pulse 97   Temp 98 2 °F (36 8 °C) (Oral)   Resp 18   Ht 5' 5" (1 651 m)   Wt 51 7 kg (114 lb)   LMP 01/01/2000 (Within Months)   SpO2 96%   BMI 18 97 kg/m²       Intake/Output Summary (Last 24 hours) at 09/13/18 1982  Last data filed at 09/13/18 0816   Gross per 24 hour   Intake             2030 ml   Output              539 ml   Net             1491 ml       Invasive Devices     Peripheral Intravenous Line            Peripheral IV 09/12/18 Left 1 day          Drain            Closed/Suction Drain Right Breast Bulb 15 Fr  less than 1 day    Closed/Suction Drain Right Breast Bulb 15 Fr  less than 1 day                Physical Exam:  General appearance: alert and oriented, in no acute distress  Lungs: clear to auscultation bilaterally  Heart: regular rate and rhythm, S1, S2 normal, no murmur, click, rub or gallop  Abdomen: soft, non-tender; bowel sounds normal; no masses,  no organomegaly  Skin: right breast marycarmen drains x 2 serosanguinous output, no evidence of hematoma, erythema      Current Facility-Administered Medications:     acetaminophen (TYLENOL) tablet 650 mg, 650 mg, Oral, Q6H, Frances Brown PA-C, 650 mg at 09/13/18 0439    calcium carbonate (TUMS) chewable tablet 1,000 mg, 1,000 mg, Oral, Daily PRN, Frances Brown PA-C    docusate sodium (COLACE) capsule 100 mg, 100 mg, Oral, BID, Frances rBown PA-C    lactobacillus acidophilus-bulgaricus [facility]) packet 1 packet, 1 packet, Oral, TID With Meals, Frances Brown PA-C    meclizine (ANTIVERT) tablet 25 mg, 25 mg, Oral, Q8H PRN, Janis Kelley PA-C    metoclopramide (REGLAN) injection 10 mg, 10 mg, Intravenous, Q6H PRN, Tam Mata PA-C, 10 mg at 09/13/18 7898    morphine injection 1 mg, 1 mg, Intravenous, Q4H PRN, Scotty Diaz PA-C, 1 mg at 09/12/18 1339    ondansetron (ZOFRAN) injection 4 mg, 4 mg, Intravenous, Q6H PRN, Scotty Diaz PA-C, 4 mg at 09/13/18 0249    oxyCODONE-acetaminophen (PERCOCET) 5-325 mg per tablet 1 tablet, 1 tablet, Oral, Q4H PRN, Scotty Diaz PA-C, 1 tablet at 09/12/18 2310    prochlorperazine (COMPAZINE) tablet 5 mg, 5 mg, Oral, Q6H PRN, Tam Maat PA-C, 5 mg at 09/12/18 2315    scopolamine (TRANSDERM-SCOP) 1 5 mg/3 days TD 72 hr patch 1 patch, 1 patch, Transdermal, Q72H, Tam Mata PA-C, 1 patch at 09/13/18 0738    sodium chloride 0 9 % infusion, 20 mL/hr, Intravenous, Continuous, Antolin Alexandra, CRNA, Last Rate: 20 mL/hr at 09/13/18 0723, 20 mL/hr at 09/13/18 0723    sulfamethoxazole-trimethoprim (BACTRIM DS) 800-160 mg per tablet 1 tablet, 1 tablet, Oral, Q12H [facility], Scotty Diaz PA-C, 1 tablet at 09/12/18 2309              Lab, Imaging and other studies:  I have personally reviewed pertinent lab results  , CBC:   Lab Results   Component Value Date    WBC 10 00 09/13/2018    HGB 11 5 09/13/2018    HCT 34 3 (L) 09/13/2018    MCV 91 09/13/2018     09/13/2018    MCH 30 6 09/13/2018    MCHC 33 5 09/13/2018    RDW 12 2 09/13/2018    MPV 10 1 09/13/2018   , CMP:   Lab Results   Component Value Date     (L) 09/13/2018    K 4 0 09/13/2018     09/13/2018    CO2 25 09/13/2018    BUN 14 09/13/2018    CREATININE 0 64 09/13/2018    CALCIUM 8 3 09/13/2018    EGFR 93 09/13/2018     Labs in chart were reviewed    Lab Results   Component Value Date    WBC 10 00 09/13/2018    HGB 11 5 09/13/2018    HCT 34 3 (L) 09/13/2018     09/13/2018     Lab Results   Component Value Date     (L) 09/13/2018    K 4 0 09/13/2018     09/13/2018    CO2 25 09/13/2018    BUN 14 09/13/2018    CREATININE 0 64 09/13/2018       VTE Mechanical Prophylaxis: sequential compression device    Assessment:    79year old female s/p right breast mastectomy with reconstruction    Plan:    - nausea control  Try meclizine if needed  - will reascces this afternoon  Encourage diet as tolerated  - pain control  - CM for VNA  - NO DVT ppx    Patient Active Problem List   Diagnosis    Menopause    Tick bite of back    Gynecologic exam normal    Ductal carcinoma in situ (DCIS) of right breast          This text is generated with voice recognition software  There may be translation, syntax,  or grammatical errors  If you have any questions, please contact the dictating provider      Claudia Weaver PA-C

## 2018-09-13 NOTE — CASE MANAGEMENT
Continued Stay Review    Date/POD# 1 :9/13/2018      Vital Signs: /57 (BP Location: Left arm)   Pulse 80   Temp 98 1 °F (36 7 °C) (Oral)   Resp 18   Ht 5' 5" (1 651 m)   Wt 51 7 kg (114 lb)   LMP 01/01/2000 (Within Months)   SpO2 98%   BMI 18 97 kg/m²     Medication:   Scheduled Meds:   Current Facility-Administered Medications:  acetaminophen 650 mg Oral Q6H    calcium carbonate 1,000 mg Oral Daily PRN    docusate sodium 100 mg Oral BID    lactobacillus acidophilus-bulgaricus 1 packet Oral TID With Meals    meclizine 25 mg Oral Q8H PRN    metoclopramide 10 mg Intravenous Q6H PRN    morphine injection 1 mg Intravenous Q4H PRN    ondansetron 4 mg Intravenous Q6H PRN    oxyCODONE-acetaminophen 1 tablet Oral Q4H PRN    prochlorperazine 5 mg Oral Q6H PRN    scopolamine 1 patch Transdermal Q72H    sodium chloride 20 mL/hr Intravenous Continuous Last Rate: 20 mL/hr (09/13/18 0723)   sulfamethoxazole-trimethoprim 1 tablet Oral Q12H [address]      Continuous Infusions:   sodium chloride 20 mL/hr Last Rate: 20 mL/hr (09/13/18 0723)     PRN Meds:     Meclizine - used x 1      Metoclopramide  10 mg iv - used x 1    zofran 4 iv - used x 2 08653; 1239)      Abnormal Labs/Diagnostic Results: na 135   hgb 11  5  hct 34 3  Age/Sex: 79 y o  female     Assessment/Plan:  Patient is post op day one s/p right breast mastectomy with reconstruction,  Overnight patient very nauseous/vomiting, pain controlled,  Will have continued stay for pain control and toleration of diet    patient remains nauseous today    Discharge Plan: vna

## 2018-09-14 ENCOUNTER — OFFICE VISIT (OUTPATIENT)
Dept: PLASTIC SURGERY | Facility: CLINIC | Age: 68
End: 2018-09-14

## 2018-09-14 VITALS — HEIGHT: 65 IN | WEIGHT: 114 LBS | BODY MASS INDEX: 18.99 KG/M2

## 2018-09-14 DIAGNOSIS — D05.11 DUCTAL CARCINOMA IN SITU (DCIS) OF RIGHT BREAST: Primary | ICD-10-CM

## 2018-09-14 PROCEDURE — 99024 POSTOP FOLLOW-UP VISIT: CPT | Performed by: PHYSICIAN ASSISTANT

## 2018-09-14 NOTE — LETTER
September 14, 2018     [address] [place] [address]    Patient: Anup Daly   YOB: 1950   Date of Visit: 9/14/2018       Dear Dr Kim Kimble:    Thank you for referring Mariam Warren to me for evaluation  Below are my notes for this consultation  If you have questions, please do not hesitate to call me  I look forward to following your patient along with you  Sincerely,        Adelia Chen PA-C        CC: MD Adelia Smith PA-C  9/14/2018  1:13 PM  Sign at close encounter  Assessment/Plan:   Torie Zimmerman is a pleasant 51-year-old female who is postop day 1 status post immediate right breast reconstruction with tissue expander and acellular dermal matrix  Please see HPI  Her dressings were changed today  We will see her back in 1 week to evaluate her drains  She was also seen in conjunction with Dr Abdiaziz Brooks  Diagnoses and all orders for this visit:    Ductal carcinoma in situ (DCIS) of right breast          Subjective:     Patient ID: Paty Ahn is a 79 y o  female  HPI   Torie Zimmerman is a pleasant 51-year-old female who is postop day 1 status post immediate right breast reconstruction with tissue expander and acellular dermal matrix  She is feeling well  She does have some complaints of tightness in the chest area and difficulty sleeping  Review of Systems   Skin:        As per HPI  Objective:     Physical Exam   Skin:   Right breast incision is clean, dry and intact  The drains are serosanguineous

## 2018-09-14 NOTE — PROGRESS NOTES
Assessment/Plan:   Yeimi Skelton is a pleasant 80-year-old female who is postop day 1 status post immediate right breast reconstruction with tissue expander and acellular dermal matrix  Please see HPI  Her dressings were changed today  We will see her back in 1 week to evaluate her drains  She was also seen in conjunction with Dr Hoang Nava  Diagnoses and all orders for this visit:    Ductal carcinoma in situ (DCIS) of right breast          Subjective:     Patient ID: Minh Fuentes is a 79 y o  female  HPI   Yeimi Skelton is a pleasant 80-year-old female who is postop day 1 status post immediate right breast reconstruction with tissue expander and acellular dermal matrix  She is feeling well  She does have some complaints of tightness in the chest area and difficulty sleeping  Review of Systems   Skin:        As per HPI  Objective:     Physical Exam   Skin:   Right breast incision is clean, dry and intact  The drains are serosanguineous

## 2018-09-18 ENCOUNTER — TELEPHONE (OUTPATIENT)
Dept: PLASTIC SURGERY | Facility: CLINIC | Age: 68
End: 2018-09-18

## 2018-09-18 DIAGNOSIS — Z98.890 POST-OPERATIVE NAUSEA AND VOMITING: Primary | ICD-10-CM

## 2018-09-18 DIAGNOSIS — Z98.890 STATUS POST BREAST RECONSTRUCTION: ICD-10-CM

## 2018-09-18 DIAGNOSIS — R11.2 POST-OPERATIVE NAUSEA AND VOMITING: Primary | ICD-10-CM

## 2018-09-18 RX ORDER — CEPHALEXIN 500 MG/1
500 CAPSULE ORAL EVERY 6 HOURS SCHEDULED
Qty: 120 CAPSULE | Refills: 0 | Status: CANCELLED | OUTPATIENT
Start: 2018-09-18 | End: 2018-10-18

## 2018-09-18 RX ORDER — ONDANSETRON 4 MG/1
4 TABLET, FILM COATED ORAL EVERY 8 HOURS PRN
Qty: 12 TABLET | Refills: 0 | Status: SHIPPED | OUTPATIENT
Start: 2018-09-18 | End: 2019-01-02 | Stop reason: ALTCHOICE

## 2018-09-18 NOTE — TELEPHONE ENCOUNTER
Received call from [address] [facility] nurse stating patient stopped taking antibiotic due to severe nausea and vomiting  Has not been taking percocet  No other complaints  Spoke with patient who states she became nauseous last night and began vomiting last night  Has not been able to keep down any food or fluids  Last dose of Bactrim at 8 pm last night  Requesting something for n/v and new antibiotic  Would like prescriptions sent to [facility] on file  Aware that I will discuss above with Dr Dimitri Haywood once he is in the office today and call her back with updates

## 2018-09-18 NOTE — TELEPHONE ENCOUNTER
Patient aware that Dr Deanne Gruber sent over Zofran to [facility] to help with n/v and that per Dr Deanne Gruber, she is to continue taking Bactrim as prescribed  Verbalized understanding

## 2018-09-21 ENCOUNTER — APPOINTMENT (OUTPATIENT)
Dept: LAB | Facility: CLINIC | Age: 68
End: 2018-09-21
Payer: MEDICARE

## 2018-09-21 ENCOUNTER — OFFICE VISIT (OUTPATIENT)
Dept: PLASTIC SURGERY | Facility: CLINIC | Age: 68
End: 2018-09-21

## 2018-09-21 DIAGNOSIS — Z98.890 S/P BREAST RECONSTRUCTION, RIGHT: ICD-10-CM

## 2018-09-21 DIAGNOSIS — Z98.890 S/P BREAST RECONSTRUCTION, RIGHT: Primary | ICD-10-CM

## 2018-09-21 DIAGNOSIS — R42 LIGHTHEADEDNESS: ICD-10-CM

## 2018-09-21 DIAGNOSIS — Z98.890 POST-OPERATIVE STATE: ICD-10-CM

## 2018-09-21 LAB
BASOPHILS # BLD AUTO: 0.02 THOUSANDS/ΜL (ref 0–0.1)
BASOPHILS NFR BLD AUTO: 0 % (ref 0–1)
EOSINOPHIL # BLD AUTO: 0.08 THOUSAND/ΜL (ref 0–0.61)
EOSINOPHIL NFR BLD AUTO: 2 % (ref 0–6)
ERYTHROCYTE [DISTWIDTH] IN BLOOD BY AUTOMATED COUNT: 12 % (ref 11.6–15.1)
HCT VFR BLD AUTO: 43.8 % (ref 34.8–46.1)
HGB BLD-MCNC: 15 G/DL (ref 11.5–15.4)
IMM GRANULOCYTES # BLD AUTO: 0.02 THOUSAND/UL (ref 0–0.2)
IMM GRANULOCYTES NFR BLD AUTO: 0 % (ref 0–2)
LYMPHOCYTES # BLD AUTO: 2.25 THOUSANDS/ΜL (ref 0.6–4.47)
LYMPHOCYTES NFR BLD AUTO: 41 % (ref 14–44)
MCH RBC QN AUTO: 30.5 PG (ref 26.8–34.3)
MCHC RBC AUTO-ENTMCNC: 34.2 G/DL (ref 31.4–37.4)
MCV RBC AUTO: 89 FL (ref 82–98)
MONOCYTES # BLD AUTO: 0.43 THOUSAND/ΜL (ref 0.17–1.22)
MONOCYTES NFR BLD AUTO: 8 % (ref 4–12)
NEUTROPHILS # BLD AUTO: 2.68 THOUSANDS/ΜL (ref 1.85–7.62)
NEUTS SEG NFR BLD AUTO: 49 % (ref 43–75)
NRBC BLD AUTO-RTO: 0 /100 WBCS
PLATELET # BLD AUTO: 265 THOUSANDS/UL (ref 149–390)
PMV BLD AUTO: 9.2 FL (ref 8.9–12.7)
RBC # BLD AUTO: 4.92 MILLION/UL (ref 3.81–5.12)
WBC # BLD AUTO: 5.48 THOUSAND/UL (ref 4.31–10.16)

## 2018-09-21 PROCEDURE — 99024 POSTOP FOLLOW-UP VISIT: CPT

## 2018-09-21 PROCEDURE — 36415 COLL VENOUS BLD VENIPUNCTURE: CPT

## 2018-09-21 PROCEDURE — 85025 COMPLETE CBC W/AUTO DIFF WBC: CPT

## 2018-09-21 NOTE — PROGRESS NOTES
Patient presents for drain assessment s/p right breast immediate reconstruction with tissue expander placement 9/12/18  Incision CDI  Drain sites benign  Drain A with output averaging about 40 ml per day or SS fluid  Drain B with output less than 5 ml per day  Drain B removed  Site covered with Bacitracin and dry gauze with instructions to change daily  Patient still with c/o of being weak and lightheaded  Vital signs stable  No c/o pain  States nausea and vomiting have resolved  Reports drinking about 50 oz of fluid daily and has been eating small meals  Is not taking any pain medication and has not resumed taking Bactrim as instructed  Stressed the importance of taking Bactrim to prevent infection while drains are in place  Advised to drink Gatorade and increase fluid and food intake if possible  Dr Henna Mccartney also advises to increase protein intake and to check H&H  Order for CBC placed  Patient verbalized understanding of all  States will go to [facility] lab this afternoon to have blood work done  Will follow up in one week for suture removal and drain assessment  Encouraged to call sooner with questions

## 2018-09-24 ENCOUNTER — TELEPHONE (OUTPATIENT)
Dept: PLASTIC SURGERY | Facility: CLINIC | Age: 68
End: 2018-09-24

## 2018-09-24 NOTE — TELEPHONE ENCOUNTER
Reviewed with patient that results of CBC were all normal  States she is feeling much better this week and has been increasing her fluid intake and making sure she is eating 3 meals with snacks in between  Will follow up at 9/28 appt  Encouraged to call sooner with questions

## 2018-09-28 ENCOUNTER — OFFICE VISIT (OUTPATIENT)
Dept: PLASTIC SURGERY | Facility: CLINIC | Age: 68
End: 2018-09-28

## 2018-09-28 DIAGNOSIS — Z98.890 POST-OPERATIVE STATE: Primary | ICD-10-CM

## 2018-09-28 PROCEDURE — 99024 POSTOP FOLLOW-UP VISIT: CPT

## 2018-09-28 NOTE — PATIENT INSTRUCTIONS
Continue to monitor drain output twice daily  Drain may be removed when output is less than 20 ml per day for 2 days in a row  Do not lift over 10 lbs  Do not drive if you are feeling lightheaded or taking pain medication  Call with questions or concerns

## 2018-09-28 NOTE — PROGRESS NOTES
Patient presents for suture removal s/p right immediate breast reconstruction 9/12/18  Incision well healed  Suture loops removed  Drain output averaging 40-50 ml per day of serosang fluid  Drain site benign  States she is still taking her Bactrim as prescribed  Will follow up in 1-2 weeks with Dr Lizzette Drake for possible expander fill  Encouraged to call if drainage decreases prior to appt or with any questions or concerns

## 2018-10-01 ENCOUNTER — OFFICE VISIT (OUTPATIENT)
Dept: SURGICAL ONCOLOGY | Facility: CLINIC | Age: 68
End: 2018-10-01

## 2018-10-01 VITALS
SYSTOLIC BLOOD PRESSURE: 126 MMHG | WEIGHT: 111 LBS | TEMPERATURE: 98 F | HEIGHT: 65 IN | RESPIRATION RATE: 18 BRPM | BODY MASS INDEX: 18.49 KG/M2 | HEART RATE: 68 BPM | DIASTOLIC BLOOD PRESSURE: 70 MMHG

## 2018-10-01 DIAGNOSIS — D05.11 DUCTAL CARCINOMA IN SITU (DCIS) OF RIGHT BREAST: Primary | ICD-10-CM

## 2018-10-01 PROCEDURE — 99024 POSTOP FOLLOW-UP VISIT: CPT | Performed by: SURGERY

## 2018-10-01 NOTE — PROGRESS NOTES
Surgical Oncology Breast Post-Op       [address] [facility],[address] [place] [address]  [facility] SURGICAL ONCOLOGY [address]  1950  [id number]  [address] [facility],[address] [place] [address]  [facility] SURGICAL ONCOLOGY [address] [address]    Chief Complaint:   Cm Rothman is seen for a post-operative visit of her recent right breast surgery  Oncology History:        Ductal carcinoma in situ (DCIS) of right breast    7/17/2018 Biopsy     Right breast biopsy  Upper inner breast  DCIS  Grade 3  ER 0  ME 0  Stage 0         9/12/2018 Surgery     Right mastectomy with sentinel lymph node biopsy  Ductal carcinoma in situ  1 6 cm  Grade 3  ER 0  ME 0  0/1 lymph node  Stage 0            Assessment & Plan:   Assessment/Plan    Patient presents for a postoperative visit for her right mastectomy for ductal carcinoma in situ  Her margin was 0 8 mm  I have reviewed this with Dr Morillo Fuel  He would not recommend adjuvant radiation therapy and consequently I have not recommended an appointment for the patient  Similarly because the patient is ER ME negative the benefit of adjuvant hormonal therapy to prevent recurrence is nil though it does decrease the chance of a new primary  However given this information the patient prefers not to take anti hormonal therapy unless necessary  Consequently I have not recommended an appointment with Medical Oncology either  She is agreeable to this  She understands that if she changes her mind we could certainly coordinate those appointments for her  Patient's wound is healing relatively well  She has a mild amount of epidermolysis  We will see her back in 3 months and then a six-month intervals with her advanced practitioner  All questions were answered the patient's satisfaction  I provided her a copy of her pathology report and personally reviewed it with her    History of Present Illness:   See above    Interval History:   See above    Review of Systems:   Review of Systems   All other systems reviewed and are negative  Past Medical History:     Patient Active Problem List   Diagnosis    Menopause    Tick bite of back    Gynecologic exam normal    Ductal carcinoma in situ (DCIS) of right breast     Past Medical History:   Diagnosis Date    Childhood asthma     as a child     Past Surgical History:   Procedure Laterality Date    MASTECTOMY W/ SENTINEL NODE BIOPSY Right 9/12/2018    Procedure: BREAST MASTECTOMY; LYMPHATIC MAPPING WITH BLUE DYE AND RADIOACTIVE DYE; SENTINEL LYMPH NODE BIOPSY (INJECT AT 0730 BY DR BALL IN THE OR); Surgeon: Ellie Richey MD;  [facility];  Service: Surgical Oncology    MN BREAST RECONSTRUC W TISS EXPANDR Right 9/12/2018    Procedure: BREAST IMMEDIATE RECONSTRUCTION, TISSUE EXPANDER;  Surgeon: Clara Sutton MD;  [facility];  Service: Plastics    MN IMPLNT BIO IMPLNT FOR SOFT TISSUE REINFORCEMENT Right 9/12/2018    Procedure: Abdias Naval;  Surgeon: Clara Sutton MD;  [facility];  Service: Plastics    WISDOM TOOTH EXTRACTION       Family History   Problem Relation Age of Onset    Hypertension Father     Prostate cancer Father     Melanoma Sister      Social History     Social History    Marital status: Single     Spouse name: N/A    Number of children: N/A    Years of education: N/A     Occupational History    Not on file       Social History Main Topics    Smoking status: Never Smoker    Smokeless tobacco: Never Used    Alcohol use Yes      Comment: 2 drinks/mo    Drug use: No    Sexual activity: Yes     Partners: Male     Birth control/ protection: Post-menopausal     Other Topics Concern    Not on file     Social History Narrative    No narrative on file       Current Outpatient Prescriptions:     ondansetron (ZOFRAN) 4 mg tablet, Take 1 tablet (4 mg total) by mouth every 8 (eight) hours as needed for nausea or vomiting, Disp: 12 tablet, Rfl: 0    Probiotic Product (PROBIOTIC-10 PO), Take 1 capsule by mouth daily in the early morning  , Disp: , Rfl:     sulfamethoxazole-trimethoprim (BACTRIM DS) 800-160 mg per tablet, Take 1 tablet by mouth every 12 (twelve) hours for 30 days, Disp: 60 tablet, Rfl: 0    oxyCODONE-acetaminophen (PERCOCET) 5-325 mg per tablet, Take 1 tablet by mouth every 4 (four) hours as needed for moderate pain for up to 18 doses Max Daily Amount: 6 tablets (Patient not taking: Reported on 9/14/2018 ), Disp: 18 tablet, Rfl: 0  No Known Allergies    Physical Exams:     Vitals:    10/01/18 0918   BP: 126/70   Pulse: 68   Resp: 18   Temp: 98 °F (36 7 °C)     Physical Exam   Pulmonary/Chest:   Examination of the right mastectomy/reconstruction shows minimal epidermolysis along the incision line  There is no evidence of infection  Patient has a small amount of swelling in the upper outer quadrant which I think is adipose tissue and not edema  Her drain shows serous fluid  Results:       Labs:       Discussion/Summary:   All questions were answered the patient's satisfaction  We will see her back in 3 months

## 2018-10-01 NOTE — LETTER
October 1, 2018     [facility] [address] [place] [address]    Patient: Sophie Daly   YOB: 1950   Date of Visit: 10/1/2018       Dear Dr Granado Greek:    Thank you for referring Nelsy Castanon to me for evaluation  Below are my notes for this consultation  If you have questions, please do not hesitate to call me  I look forward to following your patient along with you  Sincerely,        Luis Armando Price MD        CC: No Recipients  Luis Armando Price MD  10/1/2018  9:58 AM  Sign at close encounter     Surgical Oncology Breast Post-Op       [address] 310 [facility] [place] [address]  1950  [id number]  [address] [place] [address],[address]  [facility] [address] [address]    Chief Complaint:   Becky De Souza is seen for a post-operative visit of her recent right breast surgery  Oncology History:        Ductal carcinoma in situ (DCIS) of right breast    7/17/2018 Biopsy     Right breast biopsy  Upper inner breast  DCIS  Grade 3  ER 0  PA 0  Stage 0         9/12/2018 Surgery     Right mastectomy with sentinel lymph node biopsy  Ductal carcinoma in situ  1 6 cm  Grade 3  ER 0  PA 0  0/1 lymph node  Stage 0            Assessment & Plan:   Assessment/Plan    Patient presents for a postoperative visit for her right mastectomy for ductal carcinoma in situ  Her margin was 0 8 mm  I have reviewed this with Dr Nadya Gonzalez  He would not recommend adjuvant radiation therapy and consequently I have not recommended an appointment for the patient  Similarly because the patient is ER PA negative the benefit of adjuvant hormonal therapy to prevent recurrence is nil though it does decrease the chance of a new primary  However given this information the patient prefers not to take anti hormonal therapy unless necessary    Consequently I have not recommended an appointment with Medical Oncology either  She is agreeable to this  She understands that if she changes her mind we could certainly coordinate those appointments for her  Patient's wound is healing relatively well  She has a mild amount of epidermolysis  We will see her back in 3 months and then a six-month intervals with her advanced practitioner  All questions were answered the patient's satisfaction  I provided her a copy of her pathology report and personally reviewed it with her  History of Present Illness:   See above    Interval History:   See above    Review of Systems:   Review of Systems   All other systems reviewed and are negative  Past Medical History:     Patient Active Problem List   Diagnosis    Menopause    Tick bite of back    Gynecologic exam normal    Ductal carcinoma in situ (DCIS) of right breast     Past Medical History:   Diagnosis Date    Childhood asthma     as a child     Past Surgical History:   Procedure Laterality Date    MASTECTOMY W/ SENTINEL NODE BIOPSY Right 9/12/2018    Procedure: BREAST MASTECTOMY; LYMPHATIC MAPPING WITH BLUE DYE AND RADIOACTIVE DYE; SENTINEL LYMPH NODE BIOPSY (INJECT AT 0730 BY DR BALL IN THE OR);   Surgeon: Rober Cole MD;  [facility];  Service: Surgical Oncology    GA BREAST RECONSTRUC W TISS EXPANDR Right 9/12/2018    Procedure: BREAST IMMEDIATE RECONSTRUCTION, TISSUE EXPANDER;  Surgeon: Ratna Grnada MD;  [facility];  Service: Plastics    GA IMPLNT BIO IMPLNT FOR SOFT TISSUE REINFORCEMENT Right 9/12/2018    Procedure: Jcralos Shinmarianna;  Surgeon: Ratna Granda MD;  [facility];  Service: Plastics    WISDOM TOOTH EXTRACTION       Family History   Problem Relation Age of Onset    Hypertension Father     Prostate cancer Father     Melanoma Sister      Social History     Social History    Marital status: Single     Spouse name: N/A    Number of children: N/A    Years of education: N/A     Occupational History    Not on file  Social History Main Topics    Smoking status: Never Smoker    Smokeless tobacco: Never Used    Alcohol use Yes      Comment: 2 drinks/mo    Drug use: No    Sexual activity: Yes     Partners: Male     Birth control/ protection: Post-menopausal     Other Topics Concern    Not on file     Social History Narrative    No narrative on file       Current Outpatient Prescriptions:     ondansetron (ZOFRAN) 4 mg tablet, Take 1 tablet (4 mg total) by mouth every 8 (eight) hours as needed for nausea or vomiting, Disp: 12 tablet, Rfl: 0    Probiotic Product (PROBIOTIC-10 PO), Take 1 capsule by mouth daily in the early morning  , Disp: , Rfl:     sulfamethoxazole-trimethoprim (BACTRIM DS) 800-160 mg per tablet, Take 1 tablet by mouth every 12 (twelve) hours for 30 days, Disp: 60 tablet, Rfl: 0    oxyCODONE-acetaminophen (PERCOCET) 5-325 mg per tablet, Take 1 tablet by mouth every 4 (four) hours as needed for moderate pain for up to 18 doses Max Daily Amount: 6 tablets (Patient not taking: Reported on 9/14/2018 ), Disp: 18 tablet, Rfl: 0  No Known Allergies    Physical Exams:     Vitals:    10/01/18 0918   BP: 126/70   Pulse: 68   Resp: 18   Temp: 98 °F (36 7 °C)     Physical Exam   Pulmonary/Chest:   Examination of the right mastectomy/reconstruction shows minimal epidermolysis along the incision line  There is no evidence of infection  Patient has a small amount of swelling in the upper outer quadrant which I think is adipose tissue and not edema  Her drain shows serous fluid  Results:       Labs:       Discussion/Summary:   All questions were answered the patient's satisfaction  We will see her back in 3 months

## 2018-10-15 DIAGNOSIS — Z98.890 POST-OPERATIVE STATE: Primary | ICD-10-CM

## 2018-10-15 RX ORDER — SULFAMETHOXAZOLE AND TRIMETHOPRIM 800; 160 MG/1; MG/1
1 TABLET ORAL EVERY 12 HOURS SCHEDULED
Qty: 14 TABLET | Refills: 0 | Status: SHIPPED | OUTPATIENT
Start: 2018-10-15 | End: 2018-10-22

## 2018-10-15 NOTE — TELEPHONE ENCOUNTER
Patient calling to request refill if Bactrim due to drain still being in place  States output has been between 35-40 ml per day of mostly serous fluid  9/12/18 - Right immediate breast reconstruction with tissue expander placement  10/22/18 - first expander fill appt with Dr Prateek Castillo - Can you please refill Bactrim if you feel it is needed  Thank you

## 2018-10-22 ENCOUNTER — OFFICE VISIT (OUTPATIENT)
Dept: PLASTIC SURGERY | Facility: CLINIC | Age: 68
End: 2018-10-22

## 2018-10-22 VITALS — BODY MASS INDEX: 19.19 KG/M2 | WEIGHT: 115.2 LBS | HEIGHT: 65 IN

## 2018-10-22 DIAGNOSIS — Z42.1 AFTERCARE POSTMASTECTOMY FOR BREAST RECONSTRUCTION: Primary | ICD-10-CM

## 2018-10-22 PROCEDURE — 99024 POSTOP FOLLOW-UP VISIT: CPT | Performed by: SURGERY

## 2018-10-22 NOTE — PROGRESS NOTES
Ann Aaron presents today for a follow-up visit, she had undergone immediate right breast reconstruction with a tissue expander and acellular dermis on September 12, 2018, the procedure was performed with Dr Ryanne Mckenzie  Her drain remains patent for 30 cc of fluid daily, given the timeframe from surgery, I have removed drain  Also, at today's visit, 60 cc was injected into the right breast tissue expander  She will be seen again in 2 weeks for additional expansion, she knows to call should she notice any significant swelling of the right breast, or of possible indications of development of seroma

## 2018-10-22 NOTE — LETTER
October 22, 2018     Cintia Gu MD  [address] [address]    Patient: Parviz Killian   YOB: 1950   Date of Visit: 10/22/2018       Dear Dr Adrián Babcock:    Thank you for referring Romina Rodas to me for evaluation  Below are my notes for this consultation  If you have questions, please do not hesitate to call me  I look forward to following your patient along with you  Sincerely,        Deneen Duke MD        CC: No Recipients  Deneen Duke MD  10/22/2018  2:08 PM  Sign at close encounter  Maxine Wahl presents today for a follow-up visit, she had undergone immediate right breast reconstruction with a tissue expander and acellular dermis on September 12, 2018, the procedure was performed with Dr Cintia Gu  Her drain remains patent for 30 cc of fluid daily, given the timeframe from surgery, I have removed drain  Also, at today's visit, 60 cc was injected into the right breast tissue expander  She will be seen again in 2 weeks for additional expansion, she knows to call should she notice any significant swelling of the right breast, or of possible indications of development of seroma      Deneen Duke MD  10/22/2018  2:06 PM  Sign at close encounter  Procedures

## 2018-11-05 ENCOUNTER — OFFICE VISIT (OUTPATIENT)
Dept: PLASTIC SURGERY | Facility: CLINIC | Age: 68
End: 2018-11-05

## 2018-11-05 DIAGNOSIS — Z42.1 AFTERCARE POSTMASTECTOMY FOR BREAST RECONSTRUCTION: Primary | ICD-10-CM

## 2018-11-05 PROCEDURE — 99024 POSTOP FOLLOW-UP VISIT: CPT | Performed by: SURGERY

## 2018-11-05 NOTE — LETTER
November 5, 2018     Teressa Caballero MD  [facility] at  [id number]    Patient: Jada Ochoa   YOB: 1950   Date of Visit: 11/5/2018       Dear Dr Karry Phoenix:    Thank you for referring Sherman Hilario to me for evaluation  Below are my notes for this consultation  If you have questions, please do not hesitate to call me  I look forward to following your patient along with you  Sincerely,        Rachna Eason MD        CC: No Recipients  Rachna Eason MD  11/5/2018  4:47 PM  Sign at close encounter  Procedures   Ebonie Maki had undergone immediate right breast reconstruction with a tissue expander and acellular dermal matrix on September 12, 2018  The surgery was performed with Dr Karry Phoenix  She presents today for tissue expansion  Overall, she is doing well and has no complaints referable to the breast   At today's visit 50 cc of saline was injected into the expander, she now has 260 cc in a 300 cc expander  Her goal is for the left breast to be approximately 1 cup size larger, we will continue to expand the right breast, her next appointment is in 2 weeks

## 2018-11-05 NOTE — PROGRESS NOTES
Procedures   Cm Rothman had undergone immediate right breast reconstruction with a tissue expander and acellular dermal matrix on September 12, 2018  The surgery was performed with Dr Maame Solomon  She presents today for tissue expansion  Overall, she is doing well and has no complaints referable to the breast   At today's visit 50 cc of saline was injected into the expander, she now has 260 cc in a 300 cc expander  Her goal is for the left breast to be approximately 1 cup size larger, we will continue to expand the right breast, her next appointment is in 2 weeks

## 2018-11-19 ENCOUNTER — OFFICE VISIT (OUTPATIENT)
Dept: PLASTIC SURGERY | Facility: CLINIC | Age: 68
End: 2018-11-19

## 2018-11-19 DIAGNOSIS — Z42.1 AFTERCARE POSTMASTECTOMY FOR BREAST RECONSTRUCTION: Primary | ICD-10-CM

## 2018-11-19 PROCEDURE — 99024 POSTOP FOLLOW-UP VISIT: CPT | Performed by: SURGERY

## 2018-11-19 NOTE — PROGRESS NOTES
Lupe Larson presents today for tissue expansion, she had undergone immediate reconstruction of the right breast with a tissue expander and acellular dermal matrix on September 12, 2018  The surgery was performed with Dr Melvin Adan  She is interested in additional expansion, and today the right breast expander was inflated with 50 cc of saline, she now has 310 cc in a 300 cc expander  We are nearing completion of the expansion process, her next appointment is in 3 weeks  Ultimately she is interested in the left breast being perhaps 1 cup size larger and we  discussed breast implant placement for the left, expander/implant exchange on the right, it is as of yet uncertain whether those procedures will be performed concurrently or separately

## 2018-11-19 NOTE — LETTER
November 19, 2018     Anderson Silva MD  [facility] [address] [address]    Patient: Michele Bills   YOB: 1950   Date of Visit: 11/19/2018       Dear Dr Minh Josue:    Thank you for referring Vipul Hu to me for evaluation  Below are my notes for this consultation  If you have questions, please do not hesitate to call me  I look forward to following your patient along with you  Sincerely,        Román Burdick MD        CC: No Recipients  Román Burdick MD  11/19/2018  2:50 PM  Sign at close encounter  Zulma Avalos presents today for tissue expansion, she had undergone immediate reconstruction of the right breast with a tissue expander and acellular dermal matrix on September 12, 2018  The surgery was performed with Dr Minh Josue  She is interested in additional expansion, and today the right breast expander was inflated with 50 cc of saline, she now has 310 cc in a 300 cc expander  We are nearing completion of the expansion process, her next appointment is in 3 weeks  Ultimately she is interested in the left breast being perhaps 1 cup size larger and we  discussed breast implant placement for the left, expander/implant exchange on the right, it is as of yet uncertain whether those procedures will be performed concurrently or separately

## 2018-12-10 ENCOUNTER — OFFICE VISIT (OUTPATIENT)
Dept: PLASTIC SURGERY | Facility: CLINIC | Age: 68
End: 2018-12-10

## 2018-12-10 DIAGNOSIS — Z42.1 AFTERCARE POSTMASTECTOMY FOR BREAST RECONSTRUCTION: Primary | ICD-10-CM

## 2018-12-10 PROCEDURE — 99024 POSTOP FOLLOW-UP VISIT: CPT | Performed by: SURGERY

## 2018-12-10 NOTE — PROGRESS NOTES
Procedures  Bari Kraft is interested in additional expansion today, 60 cc was injected into the right breast tissue expander she now has 370 cc in a 300 cc expander  The goal is to ultimately augment the opposite breast, perhaps 1 cup size larger  We are nearing the end of the expansion process, and she will be seen again in approximately 3 weeks

## 2019-01-02 ENCOUNTER — OFFICE VISIT (OUTPATIENT)
Dept: SURGICAL ONCOLOGY | Facility: CLINIC | Age: 69
End: 2019-01-02
Payer: MEDICARE

## 2019-01-02 VITALS
RESPIRATION RATE: 14 BRPM | SYSTOLIC BLOOD PRESSURE: 122 MMHG | HEART RATE: 90 BPM | BODY MASS INDEX: 19.83 KG/M2 | TEMPERATURE: 98 F | HEIGHT: 65 IN | WEIGHT: 119 LBS | DIASTOLIC BLOOD PRESSURE: 80 MMHG

## 2019-01-02 DIAGNOSIS — D05.11 DUCTAL CARCINOMA IN SITU (DCIS) OF RIGHT BREAST: Primary | ICD-10-CM

## 2019-01-02 PROCEDURE — 99214 OFFICE O/P EST MOD 30 MIN: CPT | Performed by: SURGERY

## 2019-01-02 NOTE — LETTER
January 2, 2019     [address] [place], [place] [id number]    Patient: Dagoberto Daly   YOB: 1950   Date of Visit: 1/2/2019       Dear Dr Crow Bernabe:    Thank you for referring Stepan Lawrence to me for evaluation  Below are my notes for this consultation  If you have questions, please do not hesitate to call me  I look forward to following your patient along with you  Sincerely,        Kimmy Hooker MD        CC: No Recipients  Kimmy Hooker MD  1/2/2019  8:55 AM  Sign at close encounter     Surgical Oncology Follow Up       [address] 310 [facility] [address]  1950  [id number]  [address]  [facility] [address] [address]    Chief Complaint   Patient presents with    Follow-up     3 month follow up of dcis          Assessment & Plan:   Patient presents for a three-month follow-up visit  She has no complaints referable to her operated breast or her contralateral breast   She has 2 more fills with Dr Ruano April prior to her exchange  Clinical exam shows no evidence of local regional or distant recurrence disease  We will coordinate a left mammogram for her in June  We will see her back in 6 months  Cancer History:        Ductal carcinoma in situ (DCIS) of right breast    7/17/2018 Biopsy     Right breast biopsy  Upper inner breast  DCIS  Grade 3  ER 0  OR 0  Stage 0         9/12/2018 Surgery     Right mastectomy with sentinel lymph node biopsy  Ductal carcinoma in situ  1 6 cm  Grade 3  ER 0  OR 0  0/1 lymph node  Stage 0              Interval History:   See above    Review of Systems:   Review of Systems   Constitutional: Negative for activity change, appetite change and fatigue  HENT: Negative  Eyes: Negative  Respiratory: Negative for cough, shortness of breath and wheezing      Cardiovascular: Negative for chest pain and leg swelling  Gastrointestinal: Negative  Endocrine: Negative  Genitourinary: Negative  Musculoskeletal:        No new changes or complaints of bone pain   Skin: Negative  Allergic/Immunologic: Negative  Neurological: Negative  Hematological: Negative  Psychiatric/Behavioral: Negative  Past Medical History     Patient Active Problem List   Diagnosis    Menopause    Tick bite of back    Gynecologic exam normal    Ductal carcinoma in situ (DCIS) of right breast     Past Medical History:   Diagnosis Date    Breast cancer ([address] )     Childhood asthma     as a child     Past Surgical History:   Procedure Laterality Date    MAMMO STEREOTACTIC BREAST BIOPSY RIGHT (ALL INC) Right 7/17/2018    MASTECTOMY      MASTECTOMY W/ SENTINEL NODE BIOPSY Right 9/12/2018    Procedure: BREAST MASTECTOMY; LYMPHATIC MAPPING WITH BLUE DYE AND RADIOACTIVE DYE; SENTINEL LYMPH NODE BIOPSY (INJECT AT 0730 BY DR BALL IN THE OR); Surgeon: Pierce Enciso MD;  [facility];  Service: Surgical Oncology    MO BREAST RECONSTRUC W TISS EXPANDR Right 9/12/2018    Procedure: BREAST IMMEDIATE RECONSTRUCTION, TISSUE EXPANDER;  Surgeon: Adriana Chandra MD;  [facility];  Service: Plastics    MO IMPLNT BIO IMPLNT FOR SOFT TISSUE REINFORCEMENT Right 9/12/2018    Procedure: Aparna Erwin;  Surgeon: Adriana Chandra MD;  [facility];  Service: Plastics    WISDOM TOOTH EXTRACTION       Family History   Problem Relation Age of Onset    Hypertension Father     Prostate cancer Father     Melanoma Sister      Social History     Social History    Marital status: Single     Spouse name: N/A    Number of children: N/A    Years of education: N/A     Occupational History    Not on file       Social History Main Topics    Smoking status: Never Smoker    Smokeless tobacco: Never Used    Alcohol use Yes      Comment: 2 drinks/mo    Drug use: No    Sexual activity: Yes Partners: Male     Birth control/ protection: Post-menopausal     Other Topics Concern    Not on file     Social History Narrative    No narrative on file       Current Outpatient Prescriptions:     Probiotic Product (PROBIOTIC-10 PO), Take 1 capsule by mouth daily in the early morning  , Disp: , Rfl:   No Known Allergies    Physical Exam:     Vitals:    01/02/19 0819   BP: 122/80   Pulse: 90   Resp: 14   Temp: 98 °F (36 7 °C)     Physical Exam   Constitutional: She is oriented to person, place, and time  She appears well-developed and well-nourished  HENT:   Head: Normocephalic and atraumatic  Mouth/Throat: Oropharynx is clear and moist    Eyes: Pupils are equal, round, and reactive to light  EOM are normal    Neck: Normal range of motion  Neck supple  No JVD present  No tracheal deviation present  No thyromegaly present  Cardiovascular: Normal rate, regular rhythm, normal heart sounds and intact distal pulses  Exam reveals no gallop and no friction rub  No murmur heard  Pulmonary/Chest: Effort normal and breath sounds normal  No respiratory distress  She has no wheezes  She has no rales  Examination of the right reconstructed breast shows no worrisome skin findings there are no masses no axillary supraclavicular adenopathy  Examination of the left breast demonstrates no skin changes, dominant masses nipple discharge axillary or supraclavicular adenopathy  Abdominal: Soft  She exhibits no distension and no mass  There is no hepatomegaly  There is no tenderness  There is no rebound and no guarding  Musculoskeletal: Normal range of motion  She exhibits no edema or tenderness  Lymphadenopathy:     She has no cervical adenopathy  Neurological: She is alert and oriented to person, place, and time  No cranial nerve deficit  Skin: Skin is warm and dry  No rash noted  No erythema  Psychiatric: She has a normal mood and affect  Her behavior is normal    Vitals reviewed          Results:   No current imaging  Advance Care Planning/Advance Directives:  I discussed the disease status, treatment plans and follow-up with the patient

## 2019-01-02 NOTE — PROGRESS NOTES
Surgical Oncology Follow Up       [address] [place] [address]  [facility] [address] [address]    Dagoberto [address]  1950  [id number]  [address] [facility],[address] [place] [address]  [facility] [phone]    Chief Complaint   Patient presents with    Follow-up     3 month follow up of dcis          Assessment & Plan:   Patient presents for a three-month follow-up visit  She has no complaints referable to her operated breast or her contralateral breast   She has 2 more fills with Dr Ruano April prior to her exchange  Clinical exam shows no evidence of local regional or distant recurrence disease  We will coordinate a left mammogram for her in June  We will see her back in 6 months  Cancer History:        Ductal carcinoma in situ (DCIS) of right breast    7/17/2018 Biopsy     Right breast biopsy  Upper inner breast  DCIS  Grade 3  ER 0  VT 0  Stage 0         9/12/2018 Surgery     Right mastectomy with sentinel lymph node biopsy  Ductal carcinoma in situ  1 6 cm  Grade 3  ER 0  VT 0  0/1 lymph node  Stage 0              Interval History:   See above    Review of Systems:   Review of Systems   Constitutional: Negative for activity change, appetite change and fatigue  HENT: Negative  Eyes: Negative  Respiratory: Negative for cough, shortness of breath and wheezing  Cardiovascular: Negative for chest pain and leg swelling  Gastrointestinal: Negative  Endocrine: Negative  Genitourinary: Negative  Musculoskeletal:        No new changes or complaints of bone pain   Skin: Negative  Allergic/Immunologic: Negative  Neurological: Negative  Hematological: Negative  Psychiatric/Behavioral: Negative          Past Medical History     Patient Active Problem List   Diagnosis    Menopause    Tick bite of back    Gynecologic exam normal    Ductal carcinoma in situ (DCIS) of right breast     Past Medical History:   Diagnosis Date    Breast cancer ([facility] [address] )     Childhood asthma     as a child     Past Surgical History:   Procedure Laterality Date    MAMMO STEREOTACTIC BREAST BIOPSY RIGHT (ALL INC) Right 7/17/2018    MASTECTOMY      MASTECTOMY W/ SENTINEL NODE BIOPSY Right 9/12/2018    Procedure: BREAST MASTECTOMY; LYMPHATIC MAPPING WITH BLUE DYE AND RADIOACTIVE DYE; SENTINEL LYMPH NODE BIOPSY (INJECT AT 0730 BY DR BALL IN THE OR); Surgeon: Ellie Richey MD;  [facility];  Service: Surgical Oncology    NV BREAST RECONSTRUC W TISS EXPANDR Right 9/12/2018    Procedure: BREAST IMMEDIATE RECONSTRUCTION, TISSUE EXPANDER;  Surgeon: Clara Sutton MD;  [facility];  Service: Plastics    NV IMPLNT BIO IMPLNT FOR SOFT TISSUE REINFORCEMENT Right 9/12/2018    Procedure: Abdias Naval;  Surgeon: Clara Sutton MD;  [facility];  Service: Plastics    WISDOM TOOTH EXTRACTION       Family History   Problem Relation Age of Onset    Hypertension Father     Prostate cancer Father     Melanoma Sister      Social History     Social History    Marital status: Single     Spouse name: N/A    Number of children: N/A    Years of education: N/A     Occupational History    Not on file  Social History Main Topics    Smoking status: Never Smoker    Smokeless tobacco: Never Used    Alcohol use Yes      Comment: 2 drinks/mo    Drug use: No    Sexual activity: Yes     Partners: Male     Birth control/ protection: Post-menopausal     Other Topics Concern    Not on file     Social History Narrative    No narrative on file       Current Outpatient Prescriptions:     Probiotic Product (PROBIOTIC-10 PO), Take 1 capsule by mouth daily in the early morning  , Disp: , Rfl:   No Known Allergies    Physical Exam:     Vitals:    01/02/19 0819   BP: 122/80   Pulse: 90   Resp: 14   Temp: 98 °F (36 7 °C)     Physical Exam   Constitutional: She is oriented to person, place, and time   She appears well-developed and well-nourished  HENT:   Head: Normocephalic and atraumatic  Mouth/Throat: Oropharynx is clear and moist    Eyes: Pupils are equal, round, and reactive to light  EOM are normal    Neck: Normal range of motion  Neck supple  No JVD present  No tracheal deviation present  No thyromegaly present  Cardiovascular: Normal rate, regular rhythm, normal heart sounds and intact distal pulses  Exam reveals no gallop and no friction rub  No murmur heard  Pulmonary/Chest: Effort normal and breath sounds normal  No respiratory distress  She has no wheezes  She has no rales  Examination of the right reconstructed breast shows no worrisome skin findings there are no masses no axillary supraclavicular adenopathy  Examination of the left breast demonstrates no skin changes, dominant masses nipple discharge axillary or supraclavicular adenopathy  Abdominal: Soft  She exhibits no distension and no mass  There is no hepatomegaly  There is no tenderness  There is no rebound and no guarding  Musculoskeletal: Normal range of motion  She exhibits no edema or tenderness  Lymphadenopathy:     She has no cervical adenopathy  Neurological: She is alert and oriented to person, place, and time  No cranial nerve deficit  Skin: Skin is warm and dry  No rash noted  No erythema  Psychiatric: She has a normal mood and affect  Her behavior is normal    Vitals reviewed  Results:   No current imaging  Advance Care Planning/Advance Directives:  I discussed the disease status, treatment plans and follow-up with the patient

## 2019-01-04 ENCOUNTER — OFFICE VISIT (OUTPATIENT)
Dept: PLASTIC SURGERY | Facility: CLINIC | Age: 69
End: 2019-01-04
Payer: MEDICARE

## 2019-01-04 VITALS — WEIGHT: 119 LBS | HEIGHT: 65 IN | BODY MASS INDEX: 19.83 KG/M2

## 2019-01-04 DIAGNOSIS — Z42.1 AFTERCARE POSTMASTECTOMY FOR BREAST RECONSTRUCTION: Primary | ICD-10-CM

## 2019-01-04 PROCEDURE — 99213 OFFICE O/P EST LOW 20 MIN: CPT | Performed by: SURGERY

## 2019-01-04 NOTE — PROGRESS NOTES
Assessment/Plan:  Please see HPI  Overall she is doing well  An additional 60 cc was injected into the right breast tissue expander, she now is 430 cc in a 300 cc expander, it is likely that we have completed the expansion process  Her goal remains to augment the opposite, left breast perhaps 1 cup size  She will be seen again in a couple of weeks for re-evaluation, at that follow-up visit we will discuss the next step in the reconstruction process, namely expander/implant exchange, as well as left breast augmentation with possible mastopexy, this may be best performed in a staged fashion  We will discuss this in more detail at her follow-up visit  There are no diagnoses linked to this encounter  Subjective: Follow-up visit     Patient ID: Jada Ochoa is a 79 y o  female  HPI she presents today in follow-up, status post immediate right breast reconstruction with a tissue expander and acellular dermis from September 12, 2018  She has been tolerating the expansion process well  The following portions of the patient's history were reviewed and updated as appropriate: allergies, current medications, past family history, past medical history, past social history, past surgical history and problem list     Review of Systems   Constitutional: Negative for chills and fever  HENT: Negative for hearing loss  Eyes: Positive for visual disturbance  Negative for discharge  Wears eyeglasses   Respiratory: Negative for chest tightness and shortness of breath  Cardiovascular: Negative for chest pain and leg swelling  Gastrointestinal: Negative for blood in stool, constipation, diarrhea and nausea  Endocrine: Negative for cold intolerance and heat intolerance  Genitourinary: Negative for dysuria  Musculoskeletal: Negative for gait problem  Skin: Negative for rash  Allergic/Immunologic: Negative for immunocompromised state  Neurological: Negative for seizures and headaches  Hematological: Does not bruise/bleed easily  Psychiatric/Behavioral: Negative for dysphoric mood  The patient is not nervous/anxious  Objective:      Ht 5' 5" (1 651 m)   Wt 54 kg (119 lb)   LMP 01/01/2000 (Within Months)   BMI 19 80 kg/m²          Physical Exam   Constitutional: She appears well-developed  HENT:   Head: Normocephalic  Eyes: Pupils are equal, round, and reactive to light  Neck: Normal range of motion  Pulmonary/Chest: Effort normal    Abdominal: Soft  Musculoskeletal: Normal range of motion  Neurological: She is alert  Skin: Skin is warm  Breast examination reveals a well-positioned right breast tissue expander, there is no evidence of unusual inflammation or infection, there is a small, somewhat ptotic opposite left breast    Psychiatric: She has a normal mood and affect

## 2019-01-04 NOTE — LETTER
January 4, 2019     MD Katy [phone]    Patient: Bill Riggins   YOB: 1950   Date of Visit: 1/4/2019       Dear Dr Abimael hPam:    Thank you for referring Myrtle Krabbe to me for evaluation  Below are my notes for this consultation  If you have questions, please do not hesitate to call me  I look forward to following your patient along with you           Sincerely,        Adriana Chandra MD        CC: No Recipients

## 2019-01-21 ENCOUNTER — OFFICE VISIT (OUTPATIENT)
Dept: PLASTIC SURGERY | Facility: CLINIC | Age: 69
End: 2019-01-21
Payer: MEDICARE

## 2019-01-21 DIAGNOSIS — Z42.1 AFTERCARE POSTMASTECTOMY FOR BREAST RECONSTRUCTION: Primary | ICD-10-CM

## 2019-01-21 PROCEDURE — 99213 OFFICE O/P EST LOW 20 MIN: CPT | Performed by: SURGERY

## 2019-01-21 NOTE — PROGRESS NOTES
Assessment/Plan:  Please see HPI  Overall, she is doing well, she questions whether she has lost some volume for the right breast tissue expander  There is nothing clearly apparent in regard to a change in volume/leak, and at today's visit an additional 60 cc was injected into the right breast tissue expander  She now has 490 cc in a 300 cc expander  We talked about options moving forward, if it does appear that there is a leak in the expander the options would be to exchange it for another expander, or to proceed to expander/implant exchange, most likely in a staged fashion in regard to augmenting the opposite breast   We will discuss this again at our next visit in a couple of weeks  She is to call the office should she have any questions or concerns  There are no diagnoses linked to this encounter  Subjective:      Patient ID: Nadir Faust is a 79 y o  female  HPI    The following portions of the patient's history were reviewed and updated as appropriate: allergies, current medications, past family history, past medical history, past social history, past surgical history and problem list     Review of Systems   Constitutional: Negative for chills and fever  HENT: Negative for hearing loss  Eyes: Positive for visual disturbance  Negative for discharge  Wears eyeglasses   Respiratory: Negative for chest tightness and shortness of breath  Cardiovascular: Negative for chest pain and leg swelling  Gastrointestinal: Negative for blood in stool, constipation, diarrhea and nausea  Endocrine: Negative for cold intolerance and heat intolerance  Genitourinary: Negative for dysuria  Musculoskeletal: Negative for gait problem  Skin: Negative for rash  Allergic/Immunologic: Negative for immunocompromised state  Neurological: Negative for seizures and headaches  Hematological: Does not bruise/bleed easily  Psychiatric/Behavioral: Negative for dysphoric mood   The patient is not nervous/anxious  Objective:      LMP 01/01/2000 (Within Months)          Physical Exam   Constitutional: She is oriented to person, place, and time  She appears well-developed and well-nourished  HENT:   Head: Normocephalic  Eyes: Pupils are equal, round, and reactive to light  Neck: Normal range of motion  Cardiovascular: Normal rate  Pulmonary/Chest: Effort normal    Abdominal: Soft  Musculoskeletal: Normal range of motion  Neurological: She is alert and oriented to person, place, and time  Skin: Skin is warm  Right breast tissue expander in good position, there is no evidence of unusual inflammation or infection  The right breast is zarate than the left breast, the left breast shows ptosis when compared to the right   Psychiatric: She has a normal mood and affect

## 2019-02-05 ENCOUNTER — OFFICE VISIT (OUTPATIENT)
Dept: PLASTIC SURGERY | Facility: CLINIC | Age: 69
End: 2019-02-05
Payer: MEDICARE

## 2019-02-05 VITALS — BODY MASS INDEX: 19.83 KG/M2 | WEIGHT: 119 LBS | HEIGHT: 65 IN

## 2019-02-05 DIAGNOSIS — Z42.1 AFTERCARE POSTMASTECTOMY FOR BREAST RECONSTRUCTION: Primary | ICD-10-CM

## 2019-02-05 PROCEDURE — 99213 OFFICE O/P EST LOW 20 MIN: CPT | Performed by: SURGERY

## 2019-02-05 NOTE — PROGRESS NOTES
Assessment/Plan:  Please see HPI, she is doing well and is interested in additional expansion  Skin remains uncertain whether there may be as small slow leak in the right breast tissue expander  At today's visit an additional 60 cc was injected into the expander, she now has 550 cc in a 300 cc expander  We tried some implant sizers in on the left side to assess the volume difference, it appears to be about 150 cc, and ultimately she is interested in left breast augmentation  She will be seen again in a couple of weeks for additional expansion and to reassess whether there might be a slow leak in the expander  There are no diagnoses linked to this encounter  Subjective:      Patient ID: Ced Gunter is a 79 y o  female  HPI she has undergone right breast tissue expansion, she had initially undergone immediate right breast reconstruction with a tissue expander on September 12, 2018  The surgery was performed with Dr Mindy Benitez  Ultimately, her goal is for a 1 cup size increment and the left breast size  The following portions of the patient's history were reviewed and updated as appropriate: allergies, current medications, past family history, past medical history, past social history, past surgical history and problem list     Review of Systems   Constitutional: Negative for chills and fever  HENT: Negative for hearing loss  Eyes: Positive for visual disturbance  Negative for discharge  Wears eyeglasses   Respiratory: Negative for chest tightness and shortness of breath  Cardiovascular: Negative for chest pain and leg swelling  Gastrointestinal: Negative for blood in stool, constipation and diarrhea  Endocrine: Negative for cold intolerance and heat intolerance  Genitourinary: Negative for dysuria  Musculoskeletal: Negative for gait problem  Skin: Negative for rash  Allergic/Immunologic: Negative for immunocompromised state  Neurological: Positive for headaches  Negative for seizures  Occasional headache   Hematological: Does not bruise/bleed easily  Psychiatric/Behavioral: Negative for dysphoric mood  The patient is not nervous/anxious  Objective:      Ht 5' 5" (1 651 m)   Wt 54 kg (119 lb)   LMP 01/01/2000 (Within Months)   BMI 19 80 kg/m²          Physical Exam   Constitutional: She is oriented to person, place, and time  She appears well-developed  HENT:   Head: Normocephalic and atraumatic  Eyes: Pupils are equal, round, and reactive to light  Neck: Normal range of motion  Cardiovascular: Normal rate  Pulmonary/Chest: Effort normal    Abdominal: Soft  Musculoskeletal: Normal range of motion  Neurological: She is alert and oriented to person, place, and time  Skin: Skin is warm  Right breast tissue expander in good position, no evidence of unusual inflammation or infection   Psychiatric: She has a normal mood and affect

## 2019-02-25 ENCOUNTER — OFFICE VISIT (OUTPATIENT)
Dept: PLASTIC SURGERY | Facility: CLINIC | Age: 69
End: 2019-02-25
Payer: MEDICARE

## 2019-02-25 DIAGNOSIS — Z42.1 AFTERCARE POSTMASTECTOMY FOR BREAST RECONSTRUCTION: Primary | ICD-10-CM

## 2019-02-25 PROCEDURE — 99214 OFFICE O/P EST MOD 30 MIN: CPT | Performed by: SURGERY

## 2019-02-25 NOTE — LETTER
February 26, 2019     Lexi Lancaster MD  [address] [place] [id number]    Patient: Keke Paulson   YOB: 1950   Date of Visit: 2/25/2019       Dear Dr Tonya Abarca:    Thank you for referring Leslie Rendon to me for evaluation  Below are my notes for this consultation  If you have questions, please do not hesitate to call me  I look forward to following your patient along with you  Sincerely,        Smith Earl MD        CC: No Recipients  Smith Earl MD  2/26/2019 11:23 AM  Incomplete  Assessment/Plan:  Please see HPI  It appears as though the volume in the right tissue expander is stable, although the possibility of a persistent, slow leak remains  Regardless, at today's visit an additional 60 cc was injected into the right breast tissue expander and she now has had 610 cc injected into the expander  We discussed the next step in the reconstruction process, namely removal of the expander and placement of a breast implant  We talked about the differences between saline and silicone, smooth versus textured, round versus anatomic implants  We talked about placing a left breast implant, as her goal is to be cup size larger than she was prior to the mastectomy  We will continue to  discuss whether this is to be done at the time of the right breast surgery or in a staged fashion  We discussed the procedure as well as potential risks, complications and limitations  She will be seen again in a couple weeks for re-evaluation, and it is likely that we will at that time arrange for surgical date  There are no diagnoses linked to this encounter  Subjective: Follow-up visit     Patient ID: Keke Paulson is a 79 y o  female  HPI she had undergone immediate right breast reconstruction with a tissue expander on September 12, 2013 presents for tissue expansion  The surgery was performed with Dr Tonya Abarca      The following portions of the patient's history were reviewed and updated as appropriate: allergies, current medications, past family history, past medical history, past social history, past surgical history and problem list     Review of Systems   Constitutional: Negative for chills and fever  HENT: Negative for hearing loss  Eyes: Positive for visual disturbance  Negative for discharge  Wears eyeglasses   Respiratory: Negative for chest tightness and shortness of breath  Cardiovascular: Negative for chest pain and leg swelling  Genitourinary: Negative for dysuria  Musculoskeletal: Negative for gait problem  Skin: Negative for rash  Allergic/Immunologic: Negative for immunocompromised state  Neurological: Negative for seizures and headaches  Hematological: Does not bruise/bleed easily  Psychiatric/Behavioral: Negative for dysphoric mood  The patient is not nervous/anxious  Objective:      LMP 01/01/2000 (Within Months)          Physical Exam   HENT:   Head: Normocephalic and atraumatic  Eyes: Pupils are equal, round, and reactive to light  Neck: Normal range of motion  Pulmonary/Chest: Effort normal    Abdominal: Soft  Musculoskeletal: Normal range of motion  Skin: Skin is warm  Right breast with tissue expander in place, no evidence of unusual inflammation or infection   Psychiatric: She has a normal mood and affect

## 2019-02-26 NOTE — PROGRESS NOTES
Assessment/Plan:  Please see HPI  It appears as though the volume in the right tissue expander is stable, although the possibility of a persistent, slow leak remains  Regardless, at today's visit an additional 60 cc was injected into the right breast tissue expander and she now has had 610 cc injected into the expander  We discussed the next step in the reconstruction process, namely removal of the expander and placement of a breast implant  We talked about the differences between saline and silicone, smooth versus textured, round versus anatomic implants  We talked about placing a left breast implant, as her goal is to be cup size larger than she was prior to the mastectomy  We will continue to  discuss whether this is to be done at the time of the right breast surgery or in a staged fashion  We discussed the procedure as well as potential risks, complications and limitations  She will be seen again in a couple weeks for re-evaluation, and it is likely that we will at that time arrange for surgical date  There are no diagnoses linked to this encounter  Subjective: Follow-up visit     Patient ID: Keke Paulson is a 79 y o  female  HPI she had undergone immediate right breast reconstruction with a tissue expander on September 12, 2013 presents for tissue expansion  The surgery was performed with Dr Tonya Abarca  The following portions of the patient's history were reviewed and updated as appropriate: allergies, current medications, past family history, past medical history, past social history, past surgical history and problem list     Review of Systems   Constitutional: Negative for chills and fever  HENT: Negative for hearing loss  Eyes: Positive for visual disturbance  Negative for discharge  Wears eyeglasses   Respiratory: Negative for chest tightness and shortness of breath  Cardiovascular: Negative for chest pain and leg swelling  Genitourinary: Negative for dysuria  Musculoskeletal: Negative for gait problem  Skin: Negative for rash  Allergic/Immunologic: Negative for immunocompromised state  Neurological: Negative for seizures and headaches  Hematological: Does not bruise/bleed easily  Psychiatric/Behavioral: Negative for dysphoric mood  The patient is not nervous/anxious  Objective:      LMP 01/01/2000 (Within Months)          Physical Exam   HENT:   Head: Normocephalic and atraumatic  Eyes: Pupils are equal, round, and reactive to light  Neck: Normal range of motion  Pulmonary/Chest: Effort normal    Abdominal: Soft  Musculoskeletal: Normal range of motion  Skin: Skin is warm  Right breast with tissue expander in place, no evidence of unusual inflammation or infection   Psychiatric: She has a normal mood and affect

## 2019-03-18 ENCOUNTER — OFFICE VISIT (OUTPATIENT)
Dept: PLASTIC SURGERY | Facility: CLINIC | Age: 69
End: 2019-03-18
Payer: MEDICARE

## 2019-03-18 DIAGNOSIS — Z42.1 AFTERCARE POSTMASTECTOMY FOR BREAST RECONSTRUCTION: Primary | ICD-10-CM

## 2019-03-18 PROCEDURE — 99214 OFFICE O/P EST MOD 30 MIN: CPT | Performed by: SURGERY

## 2019-03-18 NOTE — PROGRESS NOTES
Assessment/Plan:  Please see HPI  Overall she is doing well, she feels as though some volume has been lost from the expander, and she likely has a slow leak, we have maintained much of the volume, perhaps 350 cc and she is in favor of undergoing expander removal, capsulectomy and implant placement with the goal being around a 350 cc or so implant  She will then in a staged fashion undergo augmentation of the left breast for symmetry purposes at a later date  We discussed capsulectomy, expander/implant exchange for the right breast, how the procedures performed, as well as potential risks, complications and limitations including, but not limited to infection, bleeding, scarring, asymmetry, contour deformity, migration, rippling, capsular contracture, she is aware that implants are not lifelong devices, she will need MRI surveillance, etc   At today's visit an additional 60 cc was injected into the right breast tissue expander she tolerated this well  Consent was obtained and we will work on scheduling date for the procedure  There are no diagnoses linked to this encounter  Subjective:      Patient ID: Sam Greene is a 79 y o  female  HPI    The following portions of the patient's history were reviewed and updated as appropriate: allergies, current medications, past family history, past medical history, past social history, past surgical history and problem list     Review of Systems   Constitutional: Negative for chills and fever  HENT: Negative for hearing loss  Eyes: Positive for visual disturbance  Negative for discharge  Wears eyeglasses   Respiratory: Negative for chest tightness and shortness of breath  Cardiovascular: Negative for chest pain and leg swelling  Gastrointestinal: Negative for constipation, diarrhea, nausea and rectal pain  Genitourinary: Negative for dysuria  Musculoskeletal: Negative for gait problem  Skin: Negative for rash     Allergic/Immunologic: Negative for immunocompromised state  Neurological: Negative for seizures and headaches  Hematological: Does not bruise/bleed easily  Psychiatric/Behavioral: Negative for dysphoric mood  The patient is not nervous/anxious  Objective:      [facility] 01/01/2000 (Within Months)          Physical Exam   Constitutional: She is oriented to person, place, and time  She appears well-developed and well-nourished  HENT:   Head: Normocephalic  Eyes: Pupils are equal, round, and reactive to light  Neck: Normal range of motion  Pulmonary/Chest: Effort normal    Abdominal: Soft  Musculoskeletal: Normal range of motion  Neurological: She is alert and oriented to person, place, and time  Skin: Skin is warm  Breast examination reveals the presence of a right breast tissue expander, the breast is soft and relatively opal, there are no signs of unusual inflammation or infection  The left breast reveals a smaller volume, with grade 1 ptosis/pseudo ptosis  Psychiatric: She has a normal mood and affect

## 2019-03-19 PROBLEM — Z42.1 ENCOUNTER FOR BREAST RECONSTRUCTION FOLLOWING MASTECTOMY: Status: ACTIVE | Noted: 2019-03-19

## 2019-03-19 PROCEDURE — 1124F ACP DISCUSS-NO DSCNMKR DOCD: CPT | Performed by: PHYSICIAN ASSISTANT

## 2019-03-27 ENCOUNTER — APPOINTMENT (OUTPATIENT)
Dept: LAB | Facility: CLINIC | Age: 69
End: 2019-03-27
Payer: MEDICARE

## 2019-03-27 ENCOUNTER — OFFICE VISIT (OUTPATIENT)
Dept: LAB | Facility: CLINIC | Age: 69
End: 2019-03-27
Payer: MEDICARE

## 2019-03-27 DIAGNOSIS — Z42.1 ENCOUNTER FOR BREAST RECONSTRUCTION FOLLOWING MASTECTOMY: ICD-10-CM

## 2019-03-27 LAB
ANION GAP SERPL CALCULATED.3IONS-SCNC: 9 MMOL/L (ref 4–13)
BASOPHILS # BLD AUTO: 0.04 THOUSANDS/ΜL (ref 0–0.1)
BASOPHILS NFR BLD AUTO: 1 % (ref 0–1)
BUN SERPL-MCNC: 14 MG/DL (ref 5–25)
CALCIUM SERPL-MCNC: 9.1 MG/DL (ref 8.3–10.1)
CHLORIDE SERPL-SCNC: 106 MMOL/L (ref 100–108)
CO2 SERPL-SCNC: 28 MMOL/L (ref 21–32)
CREAT SERPL-MCNC: 0.66 MG/DL (ref 0.6–1.3)
EOSINOPHIL # BLD AUTO: 0.15 THOUSAND/ΜL (ref 0–0.61)
EOSINOPHIL NFR BLD AUTO: 3 % (ref 0–6)
ERYTHROCYTE [DISTWIDTH] IN BLOOD BY AUTOMATED COUNT: 12.4 % (ref 11.6–15.1)
GFR SERPL CREATININE-BSD FRML MDRD: 92 ML/MIN/1.73SQ M
GLUCOSE SERPL-MCNC: 88 MG/DL (ref 65–140)
HCT VFR BLD AUTO: 39.4 % (ref 34.8–46.1)
HGB BLD-MCNC: 13.2 G/DL (ref 11.5–15.4)
IMM GRANULOCYTES # BLD AUTO: 0.01 THOUSAND/UL (ref 0–0.2)
IMM GRANULOCYTES NFR BLD AUTO: 0 % (ref 0–2)
LYMPHOCYTES # BLD AUTO: 1.94 THOUSANDS/ΜL (ref 0.6–4.47)
LYMPHOCYTES NFR BLD AUTO: 33 % (ref 14–44)
MCH RBC QN AUTO: 30 PG (ref 26.8–34.3)
MCHC RBC AUTO-ENTMCNC: 33.5 G/DL (ref 31.4–37.4)
MCV RBC AUTO: 90 FL (ref 82–98)
MONOCYTES # BLD AUTO: 0.44 THOUSAND/ΜL (ref 0.17–1.22)
MONOCYTES NFR BLD AUTO: 7 % (ref 4–12)
NEUTROPHILS # BLD AUTO: 3.38 THOUSANDS/ΜL (ref 1.85–7.62)
NEUTS SEG NFR BLD AUTO: 56 % (ref 43–75)
NRBC BLD AUTO-RTO: 0 /100 WBCS
PLATELET # BLD AUTO: 229 THOUSANDS/UL (ref 149–390)
PMV BLD AUTO: 10.2 FL (ref 8.9–12.7)
POTASSIUM SERPL-SCNC: 3.7 MMOL/L (ref 3.5–5.3)
RBC # BLD AUTO: 4.4 MILLION/UL (ref 3.81–5.12)
SODIUM SERPL-SCNC: 143 MMOL/L (ref 136–145)
WBC # BLD AUTO: 5.96 THOUSAND/UL (ref 4.31–10.16)

## 2019-03-27 PROCEDURE — 36415 COLL VENOUS BLD VENIPUNCTURE: CPT

## 2019-03-27 PROCEDURE — 80048 BASIC METABOLIC PNL TOTAL CA: CPT

## 2019-03-27 PROCEDURE — 85025 COMPLETE CBC W/AUTO DIFF WBC: CPT

## 2019-03-27 PROCEDURE — 93005 ELECTROCARDIOGRAM TRACING: CPT

## 2019-03-28 LAB
ATRIAL RATE: 77 BPM
P AXIS: 78 DEGREES
PR INTERVAL: 146 MS
QRS AXIS: 68 DEGREES
QRSD INTERVAL: 80 MS
QT INTERVAL: 378 MS
QTC INTERVAL: 427 MS
T WAVE AXIS: 66 DEGREES
VENTRICULAR RATE: 77 BPM

## 2019-03-28 PROCEDURE — 93010 ELECTROCARDIOGRAM REPORT: CPT | Performed by: INTERNAL MEDICINE

## 2019-04-02 ENCOUNTER — OFFICE VISIT (OUTPATIENT)
Dept: PLASTIC SURGERY | Facility: CLINIC | Age: 69
End: 2019-04-02
Payer: MEDICARE

## 2019-04-02 DIAGNOSIS — Z42.1 AFTERCARE POSTMASTECTOMY FOR BREAST RECONSTRUCTION: Primary | ICD-10-CM

## 2019-04-02 PROCEDURE — 99213 OFFICE O/P EST LOW 20 MIN: CPT | Performed by: SURGERY

## 2019-04-05 ENCOUNTER — ANESTHESIA EVENT (OUTPATIENT)
Dept: PERIOP | Facility: AMBULARY SURGERY CENTER | Age: 69
End: 2019-04-05
Payer: MEDICARE

## 2019-04-11 PROCEDURE — 99024 POSTOP FOLLOW-UP VISIT: CPT | Performed by: PHYSICIAN ASSISTANT

## 2019-04-16 ENCOUNTER — HOSPITAL ENCOUNTER (OUTPATIENT)
Facility: AMBULARY SURGERY CENTER | Age: 69
Setting detail: OUTPATIENT SURGERY
Discharge: HOME | End: 2019-04-16
Attending: SURGERY | Admitting: SURGERY
Payer: MEDICARE

## 2019-04-16 ENCOUNTER — ANESTHESIA (OUTPATIENT)
Dept: PERIOP | Facility: AMBULARY SURGERY CENTER | Age: 69
End: 2019-04-16
Payer: MEDICARE

## 2019-04-16 VITALS
HEIGHT: 65 IN | OXYGEN SATURATION: 98 % | TEMPERATURE: 98 F | RESPIRATION RATE: 18 BRPM | DIASTOLIC BLOOD PRESSURE: 58 MMHG | SYSTOLIC BLOOD PRESSURE: 158 MMHG | BODY MASS INDEX: 19.16 KG/M2 | HEART RATE: 70 BPM | WEIGHT: 115 LBS

## 2019-04-16 DIAGNOSIS — D05.11 DUCTAL CARCINOMA IN SITU (DCIS) OF RIGHT BREAST: Primary | ICD-10-CM

## 2019-04-16 DIAGNOSIS — Z42.1 ENCOUNTER FOR BREAST RECONSTRUCTION FOLLOWING MASTECTOMY: ICD-10-CM

## 2019-04-16 PROCEDURE — 88302 TISSUE EXAM BY PATHOLOGIST: CPT | Performed by: PATHOLOGY

## 2019-04-16 PROCEDURE — C1789 PROSTHESIS, BREAST, IMP: HCPCS | Performed by: SURGERY

## 2019-04-16 PROCEDURE — 19342 INSJ/RPLCMT BRST IMPLT SEP D: CPT | Performed by: PHYSICIAN ASSISTANT

## 2019-04-16 PROCEDURE — 19342 INSJ/RPLCMT BRST IMPLT SEP D: CPT | Performed by: SURGERY

## 2019-04-16 DEVICE — SMOOTH ROUND HIGH PROFILE GEL-FILLED BREAST IMPLANT, 300CC  SMOOTH ROUND SILICONE
Type: IMPLANTABLE DEVICE | Site: BREAST | Status: FUNCTIONAL
Brand: MENTOR MEMORYGEL BREAST IMPLANT

## 2019-04-16 RX ORDER — ONDANSETRON 2 MG/ML
4 INJECTION INTRAMUSCULAR; INTRAVENOUS ONCE AS NEEDED
Status: DISCONTINUED | OUTPATIENT
Start: 2019-04-16 | End: 2019-04-16 | Stop reason: HOSPADM

## 2019-04-16 RX ORDER — BUPIVACAINE HYDROCHLORIDE 2.5 MG/ML
INJECTION, SOLUTION INFILTRATION; PERINEURAL AS NEEDED
Status: DISCONTINUED | OUTPATIENT
Start: 2019-04-16 | End: 2019-04-16 | Stop reason: HOSPADM

## 2019-04-16 RX ORDER — PROPOFOL 10 MG/ML
INJECTION, EMULSION INTRAVENOUS CONTINUOUS PRN
Status: DISCONTINUED | OUTPATIENT
Start: 2019-04-16 | End: 2019-04-16 | Stop reason: SURG

## 2019-04-16 RX ORDER — ROCURONIUM BROMIDE 10 MG/ML
INJECTION, SOLUTION INTRAVENOUS AS NEEDED
Status: DISCONTINUED | OUTPATIENT
Start: 2019-04-16 | End: 2019-04-16 | Stop reason: SURG

## 2019-04-16 RX ORDER — CEPHALEXIN 500 MG/1
500 CAPSULE ORAL EVERY 6 HOURS SCHEDULED
Qty: 28 CAPSULE | Refills: 0 | Status: SHIPPED | OUTPATIENT
Start: 2019-04-16 | End: 2019-04-23

## 2019-04-16 RX ORDER — HYDROMORPHONE HCL/PF 1 MG/ML
0.25 SYRINGE (ML) INJECTION
Status: DISCONTINUED | OUTPATIENT
Start: 2019-04-16 | End: 2019-04-16 | Stop reason: HOSPADM

## 2019-04-16 RX ORDER — CEFAZOLIN SODIUM 1 G/50ML
SOLUTION INTRAVENOUS AS NEEDED
Status: DISCONTINUED | OUTPATIENT
Start: 2019-04-16 | End: 2019-04-16 | Stop reason: SURG

## 2019-04-16 RX ORDER — LIDOCAINE HYDROCHLORIDE 10 MG/ML
INJECTION, SOLUTION INFILTRATION; PERINEURAL AS NEEDED
Status: DISCONTINUED | OUTPATIENT
Start: 2019-04-16 | End: 2019-04-16 | Stop reason: SURG

## 2019-04-16 RX ORDER — CEFAZOLIN SODIUM 1 G/50ML
1000 SOLUTION INTRAVENOUS ONCE
Status: DISCONTINUED | OUTPATIENT
Start: 2019-04-16 | End: 2019-04-16 | Stop reason: HOSPADM

## 2019-04-16 RX ORDER — NEOSTIGMINE METHYLSULFATE 1 MG/ML
INJECTION INTRAVENOUS AS NEEDED
Status: DISCONTINUED | OUTPATIENT
Start: 2019-04-16 | End: 2019-04-16 | Stop reason: SURG

## 2019-04-16 RX ORDER — FENTANYL CITRATE 50 UG/ML
INJECTION, SOLUTION INTRAMUSCULAR; INTRAVENOUS AS NEEDED
Status: DISCONTINUED | OUTPATIENT
Start: 2019-04-16 | End: 2019-04-16 | Stop reason: SURG

## 2019-04-16 RX ORDER — DEXAMETHASONE SODIUM PHOSPHATE 10 MG/ML
INJECTION, SOLUTION INTRAMUSCULAR; INTRAVENOUS AS NEEDED
Status: DISCONTINUED | OUTPATIENT
Start: 2019-04-16 | End: 2019-04-16 | Stop reason: SURG

## 2019-04-16 RX ORDER — MIDAZOLAM HYDROCHLORIDE 1 MG/ML
INJECTION INTRAMUSCULAR; INTRAVENOUS AS NEEDED
Status: DISCONTINUED | OUTPATIENT
Start: 2019-04-16 | End: 2019-04-16 | Stop reason: SURG

## 2019-04-16 RX ORDER — SODIUM CHLORIDE, SODIUM LACTATE, POTASSIUM CHLORIDE, CALCIUM CHLORIDE 600; 310; 30; 20 MG/100ML; MG/100ML; MG/100ML; MG/100ML
125 INJECTION, SOLUTION INTRAVENOUS CONTINUOUS
Status: DISCONTINUED | OUTPATIENT
Start: 2019-04-16 | End: 2019-04-16 | Stop reason: HOSPADM

## 2019-04-16 RX ORDER — PROMETHAZINE HYDROCHLORIDE 25 MG/ML
6.25 INJECTION, SOLUTION INTRAMUSCULAR; INTRAVENOUS ONCE AS NEEDED
Status: DISCONTINUED | OUTPATIENT
Start: 2019-04-16 | End: 2019-04-16 | Stop reason: HOSPADM

## 2019-04-16 RX ORDER — HYDROCODONE BITARTRATE AND ACETAMINOPHEN 5; 325 MG/1; MG/1
1 TABLET ORAL EVERY 4 HOURS PRN
Status: DISCONTINUED | OUTPATIENT
Start: 2019-04-16 | End: 2019-04-16 | Stop reason: HOSPADM

## 2019-04-16 RX ORDER — PROPOFOL 10 MG/ML
INJECTION, EMULSION INTRAVENOUS AS NEEDED
Status: DISCONTINUED | OUTPATIENT
Start: 2019-04-16 | End: 2019-04-16 | Stop reason: SURG

## 2019-04-16 RX ORDER — FENTANYL CITRATE/PF 50 MCG/ML
25 SYRINGE (ML) INJECTION
Status: DISCONTINUED | OUTPATIENT
Start: 2019-04-16 | End: 2019-04-16 | Stop reason: HOSPADM

## 2019-04-16 RX ORDER — GLYCOPYRROLATE 0.2 MG/ML
INJECTION INTRAMUSCULAR; INTRAVENOUS AS NEEDED
Status: DISCONTINUED | OUTPATIENT
Start: 2019-04-16 | End: 2019-04-16 | Stop reason: SURG

## 2019-04-16 RX ORDER — SODIUM CHLORIDE 9 MG/ML
INJECTION, SOLUTION INTRAVENOUS CONTINUOUS PRN
Status: DISCONTINUED | OUTPATIENT
Start: 2019-04-16 | End: 2019-04-16 | Stop reason: SURG

## 2019-04-16 RX ADMIN — MIDAZOLAM HYDROCHLORIDE 2 MG: 1 INJECTION, SOLUTION INTRAMUSCULAR; INTRAVENOUS at 12:06

## 2019-04-16 RX ADMIN — LIDOCAINE HYDROCHLORIDE ANHYDROUS 50 MG: 10 INJECTION, SOLUTION INFILTRATION at 12:08

## 2019-04-16 RX ADMIN — FENTANYL CITRATE 100 MCG: 50 INJECTION, SOLUTION INTRAMUSCULAR; INTRAVENOUS at 12:05

## 2019-04-16 RX ADMIN — GLYCOPYRROLATE 0.3 MG: 0.2 INJECTION, SOLUTION INTRAMUSCULAR; INTRAVENOUS at 13:16

## 2019-04-16 RX ADMIN — NEOSTIGMINE METHYLSULFATE 3 MG: 1 INJECTION INTRAVENOUS at 13:16

## 2019-04-16 RX ADMIN — PROPOFOL 120 MG: 10 INJECTION, EMULSION INTRAVENOUS at 12:09

## 2019-04-16 RX ADMIN — ROCURONIUM BROMIDE 20 MG: 10 INJECTION, SOLUTION INTRAVENOUS at 12:25

## 2019-04-16 RX ADMIN — FENTANYL CITRATE 50 MCG: 50 INJECTION, SOLUTION INTRAMUSCULAR; INTRAVENOUS at 12:32

## 2019-04-16 RX ADMIN — FENTANYL CITRATE 50 MCG: 50 INJECTION, SOLUTION INTRAMUSCULAR; INTRAVENOUS at 12:26

## 2019-04-16 RX ADMIN — CEFAZOLIN SODIUM 1000 MG: 1 SOLUTION INTRAVENOUS at 12:15

## 2019-04-16 RX ADMIN — ROCURONIUM BROMIDE 30 MG: 10 INJECTION, SOLUTION INTRAVENOUS at 12:10

## 2019-04-16 RX ADMIN — PROPOFOL 150 MCG/KG/MIN: 10 INJECTION, EMULSION INTRAVENOUS at 12:12

## 2019-04-16 RX ADMIN — SODIUM CHLORIDE: 0.9 INJECTION, SOLUTION INTRAVENOUS at 11:45

## 2019-04-16 RX ADMIN — DEXAMETHASONE SODIUM PHOSPHATE 4 MG: 10 INJECTION, SOLUTION INTRAMUSCULAR; INTRAVENOUS at 12:16

## 2019-04-17 ENCOUNTER — TELEPHONE (OUTPATIENT)
Dept: PLASTIC SURGERY | Facility: CLINIC | Age: 69
End: 2019-04-17

## 2019-04-18 ENCOUNTER — OFFICE VISIT (OUTPATIENT)
Dept: PLASTIC SURGERY | Facility: CLINIC | Age: 69
End: 2019-04-18

## 2019-04-18 VITALS — BODY MASS INDEX: 19.49 KG/M2 | WEIGHT: 117 LBS | HEIGHT: 65 IN

## 2019-04-18 DIAGNOSIS — Z98.890 POST-OPERATIVE STATE: Primary | ICD-10-CM

## 2019-04-18 PROCEDURE — 99024 POSTOP FOLLOW-UP VISIT: CPT | Performed by: SURGERY

## 2019-04-30 ENCOUNTER — OFFICE VISIT (OUTPATIENT)
Dept: PLASTIC SURGERY | Facility: CLINIC | Age: 69
End: 2019-04-30

## 2019-04-30 VITALS — WEIGHT: 115 LBS | BODY MASS INDEX: 19.16 KG/M2 | HEIGHT: 65 IN

## 2019-04-30 DIAGNOSIS — Z98.890 POST-OPERATIVE STATE: Primary | ICD-10-CM

## 2019-04-30 PROCEDURE — 99024 POSTOP FOLLOW-UP VISIT: CPT | Performed by: SURGERY

## 2019-05-20 ENCOUNTER — OFFICE VISIT (OUTPATIENT)
Dept: PLASTIC SURGERY | Facility: CLINIC | Age: 69
End: 2019-05-20

## 2019-05-20 DIAGNOSIS — D05.11 DUCTAL CARCINOMA IN SITU (DCIS) OF RIGHT BREAST: Primary | ICD-10-CM

## 2019-05-20 DIAGNOSIS — Z98.890 STATUS POST RIGHT BREAST RECONSTRUCTION: ICD-10-CM

## 2019-05-20 PROCEDURE — 99024 POSTOP FOLLOW-UP VISIT: CPT | Performed by: PHYSICIAN ASSISTANT

## 2019-05-21 ENCOUNTER — OFFICE VISIT (OUTPATIENT)
Dept: OBGYN CLINIC | Facility: CLINIC | Age: 69
End: 2019-05-21
Payer: MEDICARE

## 2019-05-21 VITALS
SYSTOLIC BLOOD PRESSURE: 132 MMHG | BODY MASS INDEX: 19.49 KG/M2 | WEIGHT: 117 LBS | HEIGHT: 65 IN | DIASTOLIC BLOOD PRESSURE: 84 MMHG

## 2019-05-21 DIAGNOSIS — N89.8 VAGINAL DISCHARGE: Primary | ICD-10-CM

## 2019-05-21 LAB — SL AMB POCT WET MOUNT: NORMAL

## 2019-05-21 PROCEDURE — 99214 OFFICE O/P EST MOD 30 MIN: CPT | Performed by: PHYSICIAN ASSISTANT

## 2019-05-23 LAB — SL AMB GENITAL CULTURE: ABNORMAL

## 2019-05-29 ENCOUNTER — TELEPHONE (OUTPATIENT)
Dept: OBGYN CLINIC | Facility: CLINIC | Age: 69
End: 2019-05-29

## 2019-06-24 ENCOUNTER — HOSPITAL ENCOUNTER (OUTPATIENT)
Dept: RADIOLOGY | Facility: HOSPITAL | Age: 69
Discharge: HOME | End: 2019-06-24
Attending: SURGERY
Payer: MEDICARE

## 2019-06-24 ENCOUNTER — TELEPHONE (OUTPATIENT)
Dept: RADIOLOGY | Facility: HOSPITAL | Age: 69
End: 2019-06-24

## 2019-06-24 ENCOUNTER — HOSPITAL ENCOUNTER (OUTPATIENT)
Dept: RADIOLOGY | Facility: HOSPITAL | Age: 69
Discharge: HOME | End: 2019-06-24
Payer: MEDICARE

## 2019-06-24 VITALS — BODY MASS INDEX: 19.49 KG/M2 | WEIGHT: 117 LBS | HEIGHT: 65 IN

## 2019-06-24 DIAGNOSIS — D05.11 DUCTAL CARCINOMA IN SITU (DCIS) OF RIGHT BREAST: ICD-10-CM

## 2019-06-24 PROCEDURE — G0279 TOMOSYNTHESIS, MAMMO: HCPCS

## 2019-06-24 PROCEDURE — 76642 ULTRASOUND BREAST LIMITED: CPT

## 2019-06-24 PROCEDURE — 77065 DX MAMMO INCL CAD UNI: CPT

## 2019-07-01 ENCOUNTER — HOSPITAL ENCOUNTER (OUTPATIENT)
Dept: RADIOLOGY | Facility: HOSPITAL | Age: 69
Discharge: HOME | End: 2019-07-01
Attending: SURGERY

## 2019-07-01 ENCOUNTER — HOSPITAL ENCOUNTER (OUTPATIENT)
Dept: RADIOLOGY | Facility: HOSPITAL | Age: 69
Discharge: HOME | End: 2019-07-01
Attending: SURGERY | Admitting: RADIOLOGY
Payer: MEDICARE

## 2019-07-01 ENCOUNTER — OFFICE VISIT (OUTPATIENT)
Dept: PLASTIC SURGERY | Facility: CLINIC | Age: 69
End: 2019-07-01

## 2019-07-01 VITALS
RESPIRATION RATE: 18 BRPM | DIASTOLIC BLOOD PRESSURE: 71 MMHG | OXYGEN SATURATION: 99 % | HEART RATE: 63 BPM | SYSTOLIC BLOOD PRESSURE: 158 MMHG

## 2019-07-01 DIAGNOSIS — Z42.1 AFTERCARE POSTMASTECTOMY FOR BREAST RECONSTRUCTION: Primary | ICD-10-CM

## 2019-07-01 DIAGNOSIS — R92.8 ABNORMAL ULTRASOUND OF BREAST: ICD-10-CM

## 2019-07-01 PROCEDURE — 19084 BX BREAST ADD LESION US IMAG: CPT

## 2019-07-01 PROCEDURE — 88342 IMHCHEM/IMCYTCHM 1ST ANTB: CPT | Performed by: PATHOLOGY

## 2019-07-01 PROCEDURE — 88305 TISSUE EXAM BY PATHOLOGIST: CPT | Performed by: PATHOLOGY

## 2019-07-01 PROCEDURE — 99024 POSTOP FOLLOW-UP VISIT: CPT | Performed by: SURGERY

## 2019-07-01 PROCEDURE — 19083 BX BREAST 1ST LESION US IMAG: CPT

## 2019-07-01 RX ORDER — LIDOCAINE HYDROCHLORIDE 10 MG/ML
INJECTION, SOLUTION INFILTRATION; PERINEURAL CODE/TRAUMA/SEDATION MEDICATION
Status: COMPLETED | OUTPATIENT
Start: 2019-07-01 | End: 2019-07-01

## 2019-07-01 RX ADMIN — LIDOCAINE HYDROCHLORIDE 7 ML: 10 INJECTION, SOLUTION INFILTRATION; PERINEURAL at 11:29

## 2019-07-01 NOTE — PROGRESS NOTES
Torie Zimmerman presents today in follow-up, on April 16th 2019 she underwent right breast tissue expander/ implant exchange  The reconstructed breast looks good, she is happy with the volume  She is interested in pursuing augmentation of the left breast at some point in the future  She earlier today, underwent a left breast biopsy and we are awaiting the results  She is not in a particular hurry to undergo left breast augmentation  I would like to see her in our [facility] office in the near future so that we may utilize our implant sizing system to get a better assessment of the volume to be utilized to achieve symmetry with the right breast   She is agreeable to this approach, and we will see her in our [facility] office soon to utilize the sizing system

## 2019-07-01 NOTE — SEDATION DOCUMENTATION
Left breast biopsy x 2 sites done,A ,mass,wing clip placed; B, calcs,heart clip placed  Steristrips and dsd to sites  No bleeding noted  Pt  Tolerated well  Discharge instructions reviewed with pt  And copy to her  Ice packs provided

## 2019-07-02 ENCOUNTER — TELEPHONE (OUTPATIENT)
Dept: RADIOLOGY | Facility: HOSPITAL | Age: 69
End: 2019-07-02

## 2019-07-02 NOTE — TELEPHONE ENCOUNTER
Post procedure call completed on 7/2/19 at 0916      Bleeding: _____yes __x___no    Pain: _____yes ___x___no    Redness/Swelling: ______yes ___x___no    Band aid removed: _____yes ____x_no    Steri-Strips intact: ___x___yes _____no

## 2019-07-08 ENCOUNTER — TELEPHONE (OUTPATIENT)
Dept: SURGICAL ONCOLOGY | Facility: CLINIC | Age: 69
End: 2019-07-08

## 2019-07-08 NOTE — TELEPHONE ENCOUNTER
Called patient regarding recent breast biopsy results  Explained to patient that pathology came back benign and imaging agreed with the finding  Patient verbalized understanding and relief  Patient denies any questions or concerns at this time  Follow up appointment with Dr Alton Mcleod verified for 7/12

## 2019-07-09 PROBLEM — Z86.000 HISTORY OF DUCTAL CARCINOMA IN SITU (DCIS) OF BREAST: Status: ACTIVE | Noted: 2018-07-25

## 2019-07-09 PROBLEM — Z42.1 ENCOUNTER FOR BREAST RECONSTRUCTION FOLLOWING MASTECTOMY: Status: RESOLVED | Noted: 2019-03-19 | Resolved: 2019-07-09

## 2019-07-09 PROBLEM — Z01.419 GYNECOLOGIC EXAM NORMAL: Status: RESOLVED | Noted: 2018-05-29 | Resolved: 2019-07-09

## 2019-07-12 ENCOUNTER — OFFICE VISIT (OUTPATIENT)
Dept: SURGICAL ONCOLOGY | Facility: CLINIC | Age: 69
End: 2019-07-12
Payer: MEDICARE

## 2019-07-12 VITALS
SYSTOLIC BLOOD PRESSURE: 132 MMHG | TEMPERATURE: 97.5 F | BODY MASS INDEX: 19.33 KG/M2 | DIASTOLIC BLOOD PRESSURE: 82 MMHG | RESPIRATION RATE: 16 BRPM | HEART RATE: 72 BPM | WEIGHT: 116 LBS | HEIGHT: 65 IN

## 2019-07-12 DIAGNOSIS — Z86.000 ENCOUNTER FOR FOLLOW-UP SURVEILLANCE OF DUCTAL CARCINOMA IN SITU OF BREAST: ICD-10-CM

## 2019-07-12 DIAGNOSIS — Z08 ENCOUNTER FOR FOLLOW-UP SURVEILLANCE OF DUCTAL CARCINOMA IN SITU OF BREAST: ICD-10-CM

## 2019-07-12 DIAGNOSIS — Z86.000 HISTORY OF DUCTAL CARCINOMA IN SITU (DCIS) OF BREAST: Primary | ICD-10-CM

## 2019-07-12 PROCEDURE — 99213 OFFICE O/P EST LOW 20 MIN: CPT | Performed by: SURGERY

## 2019-07-12 PROCEDURE — 1124F ACP DISCUSS-NO DSCNMKR DOCD: CPT | Performed by: SURGERY

## 2019-07-12 NOTE — PROGRESS NOTES
Surgical Oncology Follow Up       [address] [facility] [address] [address]  [facility] [place]  [address] [facility] [address]  [facility] [address]    [address]  1950  [id number]  [address]  [facility] [place]  [address] [address]    Chief Complaint   Patient presents with    Breast Cancer     Pt is here for a six month follow up          Assessment & Plan:    patient presents today for a follow-up visit  She has had a recent biopsy of the left breast which showed only benign findings  They recommended she return to routine screening  Clinical examination demonstrates mild ecchymosis on the left side consistent with her biopsy there is no evidence of local regional recurrence on the right reconstructed breast   We will see her back in 6 months  She is agreeable to seeing Mitul Jane at her next visit  Cancer History:        History of ductal carcinoma in situ (DCIS) of breast    7/17/2018 Biopsy     Right breast biopsy  Upper inner breast  DCIS  Grade 3  ER 0  TN 0  Stage 0      9/12/2018 Surgery     Right mastectomy with sentinel lymph node biopsy  Ductal carcinoma in situ  1 6 cm  Grade 3  ER 0  TN 0  0/1 lymph node  Stage 0    Immediate reconstruction with Dr Turner Mccartney (tissue expander)      10/1/2018 - 10/1/2018 Radiation     Radiation therapy not recommended           Interval History:     See above    Review of Systems:   Review of Systems   Constitutional: Negative for activity change, appetite change and fatigue  HENT: Negative  Eyes: Negative  Respiratory: Negative for cough, shortness of breath and wheezing  Cardiovascular: Negative for chest pain and leg swelling  Gastrointestinal: Negative  Endocrine: Negative  Genitourinary: Negative  Musculoskeletal:        No new changes or complaints of bone pain   Skin: Negative  Allergic/Immunologic: Negative  Neurological: Negative      Hematological: Negative  Psychiatric/Behavioral: Negative  Past Medical History     Patient Active Problem List   Diagnosis    Menopause    Tick bite of back    History of ductal carcinoma in situ (DCIS) of breast     Past Medical History:   Diagnosis Date    Breast cancer ([facility] [address] ) 2018    Childhood asthma     as a child    PONV (postoperative nausea and vomiting)     Patient requests to be premedicated for N/V that can last 2 weeks post surgeries    Wears glasses      Past Surgical History:   Procedure Laterality Date    BREAST IMPLANT Right 4/16/2019    Procedure: BREAST TISSUE EXPANDER/IMPLANT EXCHANGE; BREAST CAPSULECTOMY;  Surgeon: Doris Franco MD;  [facility];  Service: Plastics    MAMMO STEREOTACTIC BREAST BIOPSY RIGHT (ALL INC) Right 7/17/2018    MASTECTOMY Right 2018    MASTECTOMY W/ SENTINEL NODE BIOPSY Right 9/12/2018    Procedure: BREAST MASTECTOMY; LYMPHATIC MAPPING WITH BLUE DYE AND RADIOACTIVE DYE; SENTINEL LYMPH NODE BIOPSY (INJECT AT 0730 BY DR BALL IN THE OR);   Surgeon: Mira White MD;  [facility];  Service: Surgical Oncology    LA BREAST RECONSTRUC W TISS EXPANDR Right 9/12/2018    Procedure: BREAST IMMEDIATE RECONSTRUCTION, TISSUE EXPANDER;  Surgeon: Doris Franco MD;  [facility];  Service: Plastics    LA IMPLNT BIO IMPLNT FOR SOFT TISSUE REINFORCEMENT Right 9/12/2018    Procedure: Marshia Blocker;  Surgeon: Doris Franco MD;  [facility];  Service: Plastics    US GUIDANCE BREAST BIOPSY LEFT EACH ADDITIONAL Left 7/1/2019    US GUIDED BREAST BIOPSY LEFT COMPLETE Left 7/1/2019    WISDOM TOOTH EXTRACTION       Family History   Problem Relation Age of Onset    Hypertension Father     Prostate cancer Father     No Known Problems Sister     Melanoma Sister     No Known Problems Sister     No Known Problems Sister     No Known Problems Maternal Aunt      Social History     Socioeconomic History    Marital status: Single Spouse name: Not on file    Number of children: Not on file    Years of education: Not on file    Highest education level: Not on file   Occupational History    Not on file   Social Needs    Financial resource strain: Not on file    Food insecurity:     Worry: Not on file     Inability: Not on file    Transportation needs:     Medical: Not on file     Non-medical: Not on file   Tobacco Use    Smoking status: Never Smoker    Smokeless tobacco: Never Used   Substance and Sexual Activity    Alcohol use: Yes     Frequency: Monthly or less     Drinks per session: 1 or 2     Binge frequency: Never     Comment: 2 drinks/mo    Drug use: No    Sexual activity: Yes     Partners: Male     Birth control/protection: Post-menopausal   Lifestyle    Physical activity:     Days per week: Not on file     Minutes per session: Not on file    Stress: Not on file   Relationships    Social connections:     Talks on phone: Not on file     Gets together: Not on file     Attends Confucianist service: Not on file     Active member of club or organization: Not on file     Attends meetings of clubs or organizations: Not on file     Relationship status: Not on file    Intimate partner violence:     Fear of current or ex partner: Not on file     Emotionally abused: Not on file     Physically abused: Not on file     Forced sexual activity: Not on file   Other Topics Concern    Not on file   Social History Narrative    Not on file       Current Outpatient Medications:     Probiotic Product (PROBIOTIC-10 PO), Take 1 capsule by mouth daily in the early morning  , Disp: , Rfl:   No Known Allergies    Physical Exam:     Vitals:    07/12/19 1212   BP: 132/82   Pulse: 72   Resp: 16   Temp: 97 5 °F (36 4 °C)     Physical Exam   Constitutional: She is oriented to person, place, and time  She appears well-developed and well-nourished  HENT:   Head: Normocephalic and atraumatic     Mouth/Throat: Oropharynx is clear and moist    Eyes: Pupils are equal, round, and reactive to light  EOM are normal    Neck: Normal range of motion  Neck supple  No JVD present  No tracheal deviation present  No thyromegaly present  Cardiovascular: Normal rate, regular rhythm, normal heart sounds and intact distal pulses  Exam reveals no gallop and no friction rub  No murmur heard  Pulmonary/Chest: Effort normal and breath sounds normal  No respiratory distress  She has no wheezes  She has no rales  Right breast exhibits no inverted nipple, no mass, no nipple discharge, no skin change and no tenderness  Left breast exhibits no inverted nipple, no mass, no nipple discharge, no skin change and no tenderness  Breasts are symmetrical     Examination of the right reconstructed breast demonstrate no worrisome skin findings dominant masses axillary infra or supraclavicular adenopathy  Examination of the left breast demonstrates minimal ecchymosis in the upper outer quadrant  There is a small hematoma underlying the ecchymosis  There is no axillary adenopathy  Abdominal: Soft  She exhibits no distension and no mass  There is no hepatomegaly  There is no tenderness  There is no rebound and no guarding  Musculoskeletal: Normal range of motion  She exhibits no edema or tenderness  Lymphadenopathy:     She has no cervical adenopathy  Neurological: She is alert and oriented to person, place, and time  No cranial nerve deficit  Skin: Skin is warm and dry  No rash noted  No erythema  Psychiatric: She has a normal mood and affect  Her behavior is normal    Vitals reviewed  Results:     Reviewed her mammograms and pathology report I provided her a copy of her pathology report  Advance Care Planning/Advance Directives:  I discussed the disease status, treatment plans and follow-up with the patient

## 2019-09-20 ENCOUNTER — OFFICE VISIT (OUTPATIENT)
Dept: PLASTIC SURGERY | Facility: CLINIC | Age: 69
End: 2019-09-20
Payer: MEDICARE

## 2019-09-20 DIAGNOSIS — Z42.1 AFTERCARE POSTMASTECTOMY FOR BREAST RECONSTRUCTION: Primary | ICD-10-CM

## 2019-09-20 PROCEDURE — 99214 OFFICE O/P EST MOD 30 MIN: CPT | Performed by: SURGERY

## 2019-09-20 NOTE — PROGRESS NOTES
Assessment/Plan:  Please see HPI  Jerardo Rosado is doing well, she is quite pleased with the results of the right breast reconstruction  She is interested in augmentation of the left breast   We talked about the option to utilize autologous fat transfer verses a breast implant  Her preference would be to proceed with placement of a smooth round silicone implant  She tried several sizers in a bra, and is happiest in the range of  cc, and will leave it to my discretion to determine the final implant selection  We discussed the potential to utilize partially submuscular placement verses subglandular, we discussed the risks, complications limitations  Her questions have been answered to her satisfaction and consent was obtained  She will work with our surgical coordinator to schedule a date for the procedure  Diagnoses and all orders for this visit:    Aftercare postmastectomy for breast reconstruction          Subjective:      Patient ID: Sebastian Herndon is a 81 y o  female  HPI    The following portions of the patient's history were reviewed and updated as appropriate: allergies, current medications, past family history, past medical history, past social history, past surgical history and problem list     Review of Systems   Constitutional: Negative for chills and fever  HENT: Negative for hearing loss  Eyes: Positive for visual disturbance  Negative for discharge  Wears eyeglasses   Respiratory: Negative for chest tightness and shortness of breath  Cardiovascular: Negative for chest pain and leg swelling  Gastrointestinal: Negative for blood in stool, constipation, diarrhea and nausea  Genitourinary: Negative for dysuria  Musculoskeletal: Negative for gait problem  Skin: Negative for rash  Allergic/Immunologic: Negative for immunocompromised state  Neurological: Negative for seizures and headaches  Hematological: Does not bruise/bleed easily     Psychiatric/Behavioral: Negative for dysphoric mood  The patient is not nervous/anxious  Objective:      LMP 01/01/2000 (Within Months)          Physical Exam   Constitutional: She appears well-developed and well-nourished  HENT:   Head: Normocephalic  Eyes: Pupils are equal, round, and reactive to light  Neck: Normal range of motion  Cardiovascular: Normal rate  Pulmonary/Chest: Effort normal    Abdominal: Soft  Musculoskeletal: Normal range of motion  Neurological: She is alert  Skin: Skin is warm  Right breast implant in good position, the breast is soft supple and without evidence of capsular contracture    The left breast is smaller with grade 1 +ptosis

## 2019-09-24 PROBLEM — Z85.3 PERSONAL HISTORY OF MALIGNANT NEOPLASM OF BREAST: Status: ACTIVE | Noted: 2019-09-24

## 2019-10-11 ENCOUNTER — TRANSCRIBE ORDERS (OUTPATIENT)
Dept: LAB | Facility: CLINIC | Age: 69
End: 2019-10-11

## 2019-10-11 ENCOUNTER — APPOINTMENT (OUTPATIENT)
Dept: LAB | Facility: CLINIC | Age: 69
End: 2019-10-11
Payer: MEDICARE

## 2019-10-11 ENCOUNTER — OFFICE VISIT (OUTPATIENT)
Dept: LAB | Facility: CLINIC | Age: 69
End: 2019-10-11
Payer: MEDICARE

## 2019-10-11 DIAGNOSIS — Z85.3 PERSONAL HISTORY OF MALIGNANT NEOPLASM OF BREAST: ICD-10-CM

## 2019-10-11 LAB
ANION GAP SERPL CALCULATED.3IONS-SCNC: 6 MMOL/L (ref 4–13)
ATRIAL RATE: 65 BPM
BASOPHILS # BLD AUTO: 0.03 THOUSANDS/ΜL (ref 0–0.1)
BASOPHILS NFR BLD AUTO: 1 % (ref 0–1)
BUN SERPL-MCNC: 22 MG/DL (ref 5–25)
CALCIUM SERPL-MCNC: 9 MG/DL (ref 8.3–10.1)
CHLORIDE SERPL-SCNC: 105 MMOL/L (ref 100–108)
CO2 SERPL-SCNC: 28 MMOL/L (ref 21–32)
CREAT SERPL-MCNC: 0.59 MG/DL (ref 0.6–1.3)
EOSINOPHIL # BLD AUTO: 0.14 THOUSAND/ΜL (ref 0–0.61)
EOSINOPHIL NFR BLD AUTO: 3 % (ref 0–6)
ERYTHROCYTE [DISTWIDTH] IN BLOOD BY AUTOMATED COUNT: 12.6 % (ref 11.6–15.1)
GFR SERPL CREATININE-BSD FRML MDRD: 95 ML/MIN/1.73SQ M
GLUCOSE SERPL-MCNC: 83 MG/DL (ref 65–140)
HCT VFR BLD AUTO: 38.7 % (ref 34.8–46.1)
HGB BLD-MCNC: 12.9 G/DL (ref 11.5–15.4)
IMM GRANULOCYTES # BLD AUTO: 0.01 THOUSAND/UL (ref 0–0.2)
IMM GRANULOCYTES NFR BLD AUTO: 0 % (ref 0–2)
LYMPHOCYTES # BLD AUTO: 1.76 THOUSANDS/ΜL (ref 0.6–4.47)
LYMPHOCYTES NFR BLD AUTO: 38 % (ref 14–44)
MCH RBC QN AUTO: 31 PG (ref 26.8–34.3)
MCHC RBC AUTO-ENTMCNC: 33.3 G/DL (ref 31.4–37.4)
MCV RBC AUTO: 93 FL (ref 82–98)
MONOCYTES # BLD AUTO: 0.39 THOUSAND/ΜL (ref 0.17–1.22)
MONOCYTES NFR BLD AUTO: 9 % (ref 4–12)
NEUTROPHILS # BLD AUTO: 2.28 THOUSANDS/ΜL (ref 1.85–7.62)
NEUTS SEG NFR BLD AUTO: 49 % (ref 43–75)
NRBC BLD AUTO-RTO: 0 /100 WBCS
P AXIS: 77 DEGREES
PLATELET # BLD AUTO: 208 THOUSANDS/UL (ref 149–390)
PMV BLD AUTO: 10.1 FL (ref 8.9–12.7)
POTASSIUM SERPL-SCNC: 3.9 MMOL/L (ref 3.5–5.3)
PR INTERVAL: 140 MS
QRS AXIS: 68 DEGREES
QRSD INTERVAL: 74 MS
QT INTERVAL: 418 MS
QTC INTERVAL: 434 MS
RBC # BLD AUTO: 4.16 MILLION/UL (ref 3.81–5.12)
SODIUM SERPL-SCNC: 139 MMOL/L (ref 136–145)
T WAVE AXIS: 64 DEGREES
VENTRICULAR RATE: 65 BPM
WBC # BLD AUTO: 4.61 THOUSAND/UL (ref 4.31–10.16)

## 2019-10-11 PROCEDURE — 93010 ELECTROCARDIOGRAM REPORT: CPT | Performed by: INTERNAL MEDICINE

## 2019-10-11 PROCEDURE — 93005 ELECTROCARDIOGRAM TRACING: CPT

## 2019-10-11 PROCEDURE — 36415 COLL VENOUS BLD VENIPUNCTURE: CPT

## 2019-10-11 PROCEDURE — 85025 COMPLETE CBC W/AUTO DIFF WBC: CPT

## 2019-10-11 PROCEDURE — 80048 BASIC METABOLIC PNL TOTAL CA: CPT

## 2019-11-25 PROCEDURE — 99024 POSTOP FOLLOW-UP VISIT: CPT | Performed by: PHYSICIAN ASSISTANT

## 2019-11-25 NOTE — H&P
Assessment/Plan:  Please see HPI  Darcie Artis is doing well, she is quite pleased with the results of the right breast reconstruction  She is interested in augmentation of the left breast   We talked about the option to utilize autologous fat transfer verses a breast implant  Her preference would be to proceed with placement of a smooth round silicone implant  She tried several sizers in a bra, and is happiest in the range of  cc, and will leave it to my discretion to determine the final implant selection  We discussed the potential to utilize partially submuscular placement verses subglandular, we discussed the risks, complications limitations  Her questions have been answered to her satisfaction and consent was obtained  She will work with our surgical coordinator to schedule a date for the procedure             Diagnoses and all orders for this visit:     Aftercare postmastectomy for breast reconstruction            Subjective:       Patient ID: Frankie Santos is a 81 y o  female      HPI     The following portions of the patient's history were reviewed and updated as appropriate: allergies, current medications, past family history, past medical history, past social history, past surgical history and problem list      Review of Systems   Constitutional: Negative for chills and fever  HENT: Negative for hearing loss  Eyes: Positive for visual disturbance  Negative for discharge  Wears eyeglasses   Respiratory: Negative for chest tightness and shortness of breath  Cardiovascular: Negative for chest pain and leg swelling  Gastrointestinal: Negative for blood in stool, constipation, diarrhea and nausea  Genitourinary: Negative for dysuria  Musculoskeletal: Negative for gait problem  Skin: Negative for rash  Allergic/Immunologic: Negative for immunocompromised state  Neurological: Negative for seizures and headaches  Hematological: Does not bruise/bleed easily  Psychiatric/Behavioral: Negative for dysphoric mood  The patient is not nervous/anxious            Objective:        LMP 01/01/2000 (Within Months)             Physical Exam   Constitutional: She appears well-developed and well-nourished  HENT:   Head: Normocephalic  Eyes: Pupils are equal, round, and reactive to light  Neck: Normal range of motion  Cardiovascular: Normal rate  Pulmonary/Chest: Effort normal    Abdominal: Soft  Musculoskeletal: Normal range of motion  Neurological: She is alert  Skin: Skin is warm  Right breast implant in good position, the breast is soft supple and without evidence of capsular contracture    The left breast is smaller with grade 1 +ptosis

## 2019-12-01 ENCOUNTER — ANESTHESIA EVENT (OUTPATIENT)
Dept: PERIOP | Facility: HOSPITAL | Age: 69
End: 2019-12-01
Payer: MEDICARE

## 2019-12-01 NOTE — ANESTHESIA PREPROCEDURE EVALUATION
Review of Systems/Medical History  Patient summary reviewed  Chart reviewed  History of anesthetic complications PONV    Cardiovascular  EKG reviewed, Exercise tolerance (METS): >4,     Pulmonary  Not a smoker , Asthma , No shortness of breath, No recent URI ,        GI/Hepatic    No GERD ,        Negative  ROS        Endo/Other  Negative endo/other ROS      GYN  Negative gynecology ROS   Breast cancer Right mastectomy and axillary node dissection  Comment: Post menopausal     Hematology  Negative hematology ROS      Musculoskeletal  Negative musculoskeletal ROS Lupus Arthritis,        Neurology  Negative neurology ROS      Psychology   Negative psychology ROS              Physical Exam    Airway    Mallampati score: II  TM Distance: <3 FB  Neck ROM: full     Dental   No notable dental hx     Cardiovascular  Cardiovascular exam normal    Pulmonary  Pulmonary exam normal     Other Findings        Anesthesia Plan  ASA Score- 2     Anesthesia Type- general with ASA Monitors  Additional Monitors:   Airway Plan: ETT  Comment: Discussed with pt use of TIVA due to history of severe PONV  David Patino Plan Factors-  Patient did not smoke on day of surgery  Induction- intravenous  Postoperative Plan- Plan for postoperative opioid use  Planned trial extubation    Informed Consent- Anesthetic plan and risks discussed with patient  I personally reviewed this patient with the CRNA  Discussed and agreed on the Anesthesia Plan with the LUIS Patino

## 2019-12-02 ENCOUNTER — HOSPITAL ENCOUNTER (OUTPATIENT)
Facility: HOSPITAL | Age: 69
Setting detail: OUTPATIENT SURGERY
Discharge: HOME | End: 2019-12-02
Attending: SURGERY | Admitting: SURGERY
Payer: MEDICARE

## 2019-12-02 ENCOUNTER — ANESTHESIA (OUTPATIENT)
Dept: PERIOP | Facility: HOSPITAL | Age: 69
End: 2019-12-02
Payer: MEDICARE

## 2019-12-02 VITALS
HEIGHT: 65 IN | TEMPERATURE: 98.2 F | DIASTOLIC BLOOD PRESSURE: 70 MMHG | BODY MASS INDEX: 19.33 KG/M2 | RESPIRATION RATE: 18 BRPM | SYSTOLIC BLOOD PRESSURE: 149 MMHG | OXYGEN SATURATION: 98 % | WEIGHT: 116 LBS | HEART RATE: 77 BPM

## 2019-12-02 DIAGNOSIS — Z85.3 PERSONAL HISTORY OF MALIGNANT NEOPLASM OF BREAST: Primary | ICD-10-CM

## 2019-12-02 PROCEDURE — C1789 PROSTHESIS, BREAST, IMP: HCPCS | Performed by: SURGERY

## 2019-12-02 PROCEDURE — 19325 BREAST AUGMENTATION W/IMPLT: CPT | Performed by: SURGERY

## 2019-12-02 DEVICE — SMOOTH ROUND MODERATE CLASSIC PROFILE GEL-FILLED BREAST IMPLANT, 150CC  SMOOTH ROUND SILICONE
Type: IMPLANTABLE DEVICE | Site: CHEST | Status: FUNCTIONAL
Brand: MENTOR MEMORYGEL BREAST IMPLANT

## 2019-12-02 RX ORDER — PROPOFOL 10 MG/ML
INJECTION, EMULSION INTRAVENOUS CONTINUOUS PRN
Status: DISCONTINUED | OUTPATIENT
Start: 2019-12-02 | End: 2019-12-02 | Stop reason: SURG

## 2019-12-02 RX ORDER — LIDOCAINE HYDROCHLORIDE 10 MG/ML
INJECTION, SOLUTION INFILTRATION; PERINEURAL AS NEEDED
Status: DISCONTINUED | OUTPATIENT
Start: 2019-12-02 | End: 2019-12-02 | Stop reason: SURG

## 2019-12-02 RX ORDER — HYDROMORPHONE HCL/PF 1 MG/ML
0.2 SYRINGE (ML) INJECTION
Status: DISCONTINUED | OUTPATIENT
Start: 2019-12-02 | End: 2019-12-02 | Stop reason: HOSPADM

## 2019-12-02 RX ORDER — MIDAZOLAM HYDROCHLORIDE 2 MG/2ML
INJECTION, SOLUTION INTRAMUSCULAR; INTRAVENOUS AS NEEDED
Status: DISCONTINUED | OUTPATIENT
Start: 2019-12-02 | End: 2019-12-02 | Stop reason: SURG

## 2019-12-02 RX ORDER — ONDANSETRON 2 MG/ML
INJECTION INTRAMUSCULAR; INTRAVENOUS AS NEEDED
Status: DISCONTINUED | OUTPATIENT
Start: 2019-12-02 | End: 2019-12-02 | Stop reason: SURG

## 2019-12-02 RX ORDER — OXYCODONE HYDROCHLORIDE AND ACETAMINOPHEN 5; 325 MG/1; MG/1
1 TABLET ORAL EVERY 4 HOURS PRN
Qty: 16 TABLET | Refills: 0 | Status: SHIPPED | OUTPATIENT
Start: 2019-12-02 | End: 2020-06-29 | Stop reason: ALTCHOICE

## 2019-12-02 RX ORDER — SODIUM CHLORIDE, SODIUM LACTATE, POTASSIUM CHLORIDE, CALCIUM CHLORIDE 600; 310; 30; 20 MG/100ML; MG/100ML; MG/100ML; MG/100ML
125 INJECTION, SOLUTION INTRAVENOUS CONTINUOUS
Status: DISCONTINUED | OUTPATIENT
Start: 2019-12-02 | End: 2019-12-02 | Stop reason: HOSPADM

## 2019-12-02 RX ORDER — GLYCOPYRROLATE 0.2 MG/ML
INJECTION INTRAMUSCULAR; INTRAVENOUS AS NEEDED
Status: DISCONTINUED | OUTPATIENT
Start: 2019-12-02 | End: 2019-12-02 | Stop reason: SURG

## 2019-12-02 RX ORDER — PROMETHAZINE HYDROCHLORIDE 25 MG/ML
25 INJECTION, SOLUTION INTRAMUSCULAR; INTRAVENOUS ONCE AS NEEDED
Status: DISCONTINUED | OUTPATIENT
Start: 2019-12-02 | End: 2019-12-02 | Stop reason: HOSPADM

## 2019-12-02 RX ORDER — ONDANSETRON 2 MG/ML
4 INJECTION INTRAMUSCULAR; INTRAVENOUS ONCE AS NEEDED
Status: DISCONTINUED | OUTPATIENT
Start: 2019-12-02 | End: 2019-12-02 | Stop reason: HOSPADM

## 2019-12-02 RX ORDER — EPHEDRINE SULFATE 50 MG/ML
INJECTION INTRAVENOUS AS NEEDED
Status: DISCONTINUED | OUTPATIENT
Start: 2019-12-02 | End: 2019-12-02 | Stop reason: SURG

## 2019-12-02 RX ORDER — LIDOCAINE HYDROCHLORIDE AND EPINEPHRINE 10; 10 MG/ML; UG/ML
INJECTION, SOLUTION INFILTRATION; PERINEURAL AS NEEDED
Status: DISCONTINUED | OUTPATIENT
Start: 2019-12-02 | End: 2019-12-02 | Stop reason: HOSPADM

## 2019-12-02 RX ORDER — FENTANYL CITRATE 50 UG/ML
INJECTION, SOLUTION INTRAMUSCULAR; INTRAVENOUS AS NEEDED
Status: DISCONTINUED | OUTPATIENT
Start: 2019-12-02 | End: 2019-12-02 | Stop reason: SURG

## 2019-12-02 RX ORDER — DEXAMETHASONE SODIUM PHOSPHATE 4 MG/ML
INJECTION, SOLUTION INTRA-ARTICULAR; INTRALESIONAL; INTRAMUSCULAR; INTRAVENOUS; SOFT TISSUE AS NEEDED
Status: DISCONTINUED | OUTPATIENT
Start: 2019-12-02 | End: 2019-12-02 | Stop reason: SURG

## 2019-12-02 RX ORDER — CEPHALEXIN 500 MG/1
500 CAPSULE ORAL EVERY 6 HOURS SCHEDULED
Qty: 28 CAPSULE | Refills: 0 | Status: SHIPPED | OUTPATIENT
Start: 2019-12-02 | End: 2019-12-09

## 2019-12-02 RX ORDER — CEFAZOLIN SODIUM 1 G/50ML
1000 SOLUTION INTRAVENOUS ONCE
Status: COMPLETED | OUTPATIENT
Start: 2019-12-02 | End: 2019-12-02

## 2019-12-02 RX ORDER — OXYCODONE HYDROCHLORIDE AND ACETAMINOPHEN 5; 325 MG/1; MG/1
1 TABLET ORAL EVERY 4 HOURS PRN
Status: DISCONTINUED | OUTPATIENT
Start: 2019-12-02 | End: 2019-12-02 | Stop reason: HOSPADM

## 2019-12-02 RX ORDER — CEFOXITIN SODIUM 2 G/50ML
INJECTION, SOLUTION INTRAVENOUS AS NEEDED
Status: DISCONTINUED | OUTPATIENT
Start: 2019-12-02 | End: 2019-12-02

## 2019-12-02 RX ORDER — DEXAMETHASONE SODIUM PHOSPHATE 10 MG/ML
INJECTION, SOLUTION INTRAMUSCULAR; INTRAVENOUS AS NEEDED
Status: DISCONTINUED | OUTPATIENT
Start: 2019-12-02 | End: 2019-12-02 | Stop reason: SURG

## 2019-12-02 RX ORDER — FENTANYL CITRATE/PF 50 MCG/ML
25 SYRINGE (ML) INJECTION
Status: DISCONTINUED | OUTPATIENT
Start: 2019-12-02 | End: 2019-12-02 | Stop reason: HOSPADM

## 2019-12-02 RX ORDER — PROPOFOL 10 MG/ML
INJECTION, EMULSION INTRAVENOUS AS NEEDED
Status: DISCONTINUED | OUTPATIENT
Start: 2019-12-02 | End: 2019-12-02 | Stop reason: SURG

## 2019-12-02 RX ORDER — BUPIVACAINE HYDROCHLORIDE 2.5 MG/ML
INJECTION, SOLUTION INFILTRATION; PERINEURAL AS NEEDED
Status: DISCONTINUED | OUTPATIENT
Start: 2019-12-02 | End: 2019-12-02 | Stop reason: HOSPADM

## 2019-12-02 RX ADMIN — PROPOFOL 120 MG: 10 INJECTION, EMULSION INTRAVENOUS at 10:14

## 2019-12-02 RX ADMIN — DEXAMETHASONE SODIUM PHOSPHATE 4 MG: 4 INJECTION, SOLUTION INTRAMUSCULAR; INTRAVENOUS at 10:17

## 2019-12-02 RX ADMIN — DEXAMETHASONE SODIUM PHOSPHATE 8 MG: 10 INJECTION, SOLUTION INTRAMUSCULAR; INTRAVENOUS at 10:20

## 2019-12-02 RX ADMIN — FENTANYL CITRATE 100 MCG: 50 INJECTION INTRAMUSCULAR; INTRAVENOUS at 10:14

## 2019-12-02 RX ADMIN — LIDOCAINE HYDROCHLORIDE 50 MG: 10 INJECTION, SOLUTION INFILTRATION; PERINEURAL at 10:14

## 2019-12-02 RX ADMIN — MIDAZOLAM HYDROCHLORIDE 2 MG: 1 INJECTION, SOLUTION INTRAMUSCULAR; INTRAVENOUS at 10:06

## 2019-12-02 RX ADMIN — EPHEDRINE SULFATE 5 MG: 50 INJECTION, SOLUTION INTRAVENOUS at 10:30

## 2019-12-02 RX ADMIN — SODIUM CHLORIDE, SODIUM LACTATE, POTASSIUM CHLORIDE, AND CALCIUM CHLORIDE: .6; .31; .03; .02 INJECTION, SOLUTION INTRAVENOUS at 10:05

## 2019-12-02 RX ADMIN — FENTANYL CITRATE 25 MCG: 50 INJECTION INTRAMUSCULAR; INTRAVENOUS at 10:41

## 2019-12-02 RX ADMIN — ONDANSETRON 4 MG: 2 INJECTION INTRAMUSCULAR; INTRAVENOUS at 10:51

## 2019-12-02 RX ADMIN — CEFAZOLIN SODIUM 1000 MG: 1 SOLUTION INTRAVENOUS at 10:05

## 2019-12-02 RX ADMIN — GLYCOPYRROLATE 0.2 MG: 0.2 INJECTION, SOLUTION INTRAMUSCULAR; INTRAVENOUS at 10:28

## 2019-12-02 RX ADMIN — PROPOFOL 120 MCG/KG/MIN: 10 INJECTION, EMULSION INTRAVENOUS at 10:17

## 2019-12-02 NOTE — DISCHARGE INSTRUCTIONS
[facility]  Dr Andriy Solis   [address] [facility] [address], [address]  Phone: [phone]     Postoperative Instructions for Outpatient Surgery     These instructions are being provided by your doctor to give you basic guidelines during your post-op recovery  Please let our office know if your contact information has changed       Please call the office today for an appointment on Wednesday 12/4 for a dressing change       Dressings: Keep surgical bra on and keep clean and dry       Activity Restrictions: Nothing strenuous for 4 weeks       Bathing:  Sponge bathe only until seen in the office       Medications:    Resume pre-op medications  You may take tylenol, aleve, or ibuprofen for pain control             Percocet as needed for pain  Keflex for infection prevention  Other: Apply ice to breast at 15 minute intervals over the first 48 hours while awake

## 2019-12-02 NOTE — OP NOTE
OPERATIVE REPORT  PATIENT NAME: Harriet Beck    :  1950  MRN: [id number]  Pt Location: [facility] ROOM 02    SURGERY DATE: 2019    Surgeon(s) and Role:     * David Do MD - Primary     Robert Garner MD assistant    Preop Diagnosis:  Personal history of malignant neoplasm of breast [Z85 3]    Post-Op Diagnosis Codes:     * Personal history of malignant neoplasm of breast [Z85 3]    Procedure(s) (LRB):  BREAST AUGMENTATION (Left)    Specimen(s):  * No specimens in log *    Estimated Blood Loss:   Minimal    Drains:  * No LDAs found *    Anesthesia Type:   General    Operative Indications:  Personal history of malignant neoplasm of breast [Z85 3]  Status post right breast mastectomy and reconstruction    Operative Findings:  As above    Complications:   None  Ellen Dryer was seen preoperatively in the holding area, both breasts were marked, and we discussed the planned procedure of left breast augmentation  She was then taken to the operating room and underwent induction of anesthesia by the anesthesia personnel  The operative field was then prepped and draped sterile fashion and a proper time-out was performed  The left breast was then infiltrated with xylocaine with epinephrine in the usual fashion for breast augmentation  The previously placed markings along the inframammary fold were reinforced with a sterile surgical marking pen  The skin incision was then created with a 15 blade, dissection then proceeded through the subcutaneous tissue utilizing Bovie cautery  Initially, subglandular dissection was performed utilizing the Bovie cautery and hemostasis was assured throughout the Bovie  Once adequate to subglandular dissection had been completed the pocket was irrigated with antibiotic solution  A mentor 130 cc moderate plus pro profile with implant Sizer was then selected, it was soaked in antibiotic solution placed within the pocket    Patient was assessed from the foot of the bed both the supine and upright position  It was deemed that the volume was slightly less than the opposite, right breast, as well as the superior edge of the implant being visibly noticeable  It was felt that a slightly larger implant Sizer, and sub pectoral placement would help with this  the Sizer was then removed and the submuscular dissection proceeded deep to the pectoralis  The lateral border of  the pectoralis was identified, elevated up off the chest wall utilizing a lighted retractor and Bovie cautery  Perforating vessels were clamped and cauterized them  Subpectoral dissection was then completed and the wound was thoroughly irrigated  Hemostasis was good  A mentor or 150 cc moderate profile implant Sizer was then selected, soaked in antibiotic solution and placed in the subpectoral pocket  The skin was temporary closed with skin staples the patient was assessed from the foot of the bed both the supine and upright position  This gave good symmetry with the opposite breast   The Sizer was then removed, the pocket was thoroughly irrigated multiple times with antibiotic solution and hemostasis was adequate  The skin was then re-prepped and draped, the instruments were wiped down with antibiotic solution, the surgical team's gloves were changed  A mentor or 150 cc moderate profile implant was then soaked in antibiotic solution, placed within the [employer] funnel and delivered into the dual plane/subpectoral pocket  The implant was position manually and closure was accomplished with 2-0 Vicryl sutures to repair the deep layer over the implant, prior to placing the last sutures at this level 10 cc of 0 25% Marcaine was instilled into the pocket  Following this deep dermal closure was undertaken with 3-0 Vicryl, this was followed by running subcuticular 4-0 PDS  Benzoin Steri-Strips and dry sterile dressing and surgical bra were applied the patient was transferred to the recovery room     I was present for the entire procedure and A qualified resident physician was not available    Patient Disposition:  PACU     SIGNATURE: Pablo Ortiz MD  DATE: December 2, 2019  TIME: 12:49 PM

## 2019-12-02 NOTE — INTERVAL H&P NOTE
H&P reviewed  After examining the patient I find no changes in the patients condition since the H&P had been written      Vitals:    12/02/19 0854   BP: 166/79   Pulse: 81   Resp: 20   Temp: 99 5 °F (37 5 °C)   SpO2: 98%

## 2019-12-02 NOTE — ANESTHESIA POSTPROCEDURE EVALUATION
Post-Op Assessment Note    CV Status:  Stable  Pain Score: 0    Pain management: adequate     Mental Status:  Arousable and sleepy   Hydration Status:  Euvolemic   PONV Controlled:  Controlled   Airway Patency:  Patent   Post Op Vitals Reviewed: Yes      Staff: CRNA           BP   132/77   Temp   97   Pulse  90   Resp   16   SpO2   100

## 2019-12-02 NOTE — INTERIM OP NOTE
BREAST AUGMENTATION  Postoperative Note  PATIENT NAME: Rosa Daly  : 1950  MRN: [id number]  [facility]    Surgery Date: 2019    Preop Diagnosis:  Personal history of malignant neoplasm of breast [Z85 3]    Post-Op Diagnosis Codes:     * Personal history of malignant neoplasm of breast [Z85 3]    Procedure(s) (LRB):  BREAST AUGMENTATION (Left)    Surgeon(s) and Role:     * Sergio Rowan MD - Primary     * Shekhar Schultz PA-C - Assisting    Specimens:  * No specimens in log *    Estimated Blood Loss:   Minimal    Anesthesia Type:   General     Findings:    None  Complications:   None    SIGNATURE: Shekhar Schultz PA-C   DATE: 2019   TIME: 10:53 AM

## 2019-12-04 ENCOUNTER — OFFICE VISIT (OUTPATIENT)
Dept: PLASTIC SURGERY | Facility: CLINIC | Age: 69
End: 2019-12-04

## 2019-12-04 DIAGNOSIS — Z98.890 POST-OPERATIVE STATE: Primary | ICD-10-CM

## 2019-12-04 PROCEDURE — 99024 POSTOP FOLLOW-UP VISIT: CPT | Performed by: SURGERY

## 2019-12-04 NOTE — PROGRESS NOTES
Kedar Buckner is a 81 y o  Female 2 days status post BREAST AUGMENTATION (Left)  Patient in the office for dressing change  Dressings taken down  No bruising noted  Steri strip intact  Photos taken  ABD's place and surgical bra placed  Patient to follow up 12/16/19 for suture removal and 3 months with dr Rufus Kat

## 2019-12-16 ENCOUNTER — OFFICE VISIT (OUTPATIENT)
Dept: PLASTIC SURGERY | Facility: CLINIC | Age: 69
End: 2019-12-16

## 2019-12-16 DIAGNOSIS — Z98.890 POST-OPERATIVE STATE: Primary | ICD-10-CM

## 2019-12-16 PROCEDURE — 99024 POSTOP FOLLOW-UP VISIT: CPT | Performed by: SURGERY

## 2019-12-16 NOTE — PROGRESS NOTES
Francine Ferrer is a 81 y o  Female status post BREAST AUGMENTATION (Left) done on 12/2/19  Patient is in the office for suture removal  Sutures removed  Photographs take  The patient will follow in 3 months post operatively

## 2020-01-15 ENCOUNTER — OFFICE VISIT (OUTPATIENT)
Dept: SURGICAL ONCOLOGY | Facility: CLINIC | Age: 70
End: 2020-01-15
Payer: MEDICARE

## 2020-01-15 VITALS
DIASTOLIC BLOOD PRESSURE: 80 MMHG | HEIGHT: 65 IN | HEART RATE: 75 BPM | BODY MASS INDEX: 18.83 KG/M2 | WEIGHT: 113 LBS | OXYGEN SATURATION: 97 % | TEMPERATURE: 98.2 F | SYSTOLIC BLOOD PRESSURE: 126 MMHG | RESPIRATION RATE: 16 BRPM

## 2020-01-15 DIAGNOSIS — Z86.000 HISTORY OF DUCTAL CARCINOMA IN SITU (DCIS) OF BREAST: ICD-10-CM

## 2020-01-15 DIAGNOSIS — Z08 ENCOUNTER FOR FOLLOW-UP EXAMINATION AFTER COMPLETED TREATMENT FOR MALIGNANT NEOPLASM: Primary | ICD-10-CM

## 2020-01-15 PROCEDURE — 99213 OFFICE O/P EST LOW 20 MIN: CPT | Performed by: NURSE PRACTITIONER

## 2020-01-15 NOTE — PROGRESS NOTES
Surgical Oncology Follow Up       [address] [facility]  [facility] [place]  [address] [facility] [address]  [facility] [address]    Carmela Gill Walk  1950  [id number]  [address]  [facility] [place]  [address] [address]    Chief Complaint   Patient presents with    Follow-up     6 month        Assessment/Plan:  1  Encounter for follow-up examination after completed treatment for malignant neoplasm    2  History of ductal carcinoma in situ (DCIS) of breast  - Mammo diagnostic left w 3d & cad; Future  - 6 mo f/u visit    Discussion/Summary:  Patient is a 17-year-old female who presents today for a six-month follow-up visit for right breast cancer diagnosed in July of 2018  Her pathology revealed DCIS, ER/NV negative  She underwent a right mastectomy and a sentinel node biopsy by Dr Scar Tovar  She had immediate reconstruction with Dr Meredith Self  She did not receive adjuvant therapy  She underwent expander implant exchange in April of 2019 and a left breast augmentation in December of 2019  She had a left diagnostic mammogram and ultrasound performed on June 24, 2019 which revealed calcifications I biopsy was recommended  This was performed and pathology was benign  There was focal usual ductal hyperplasia without atypia noted  She has no new complaints today and there are no concerns on today's exam  I will order left diagnostic mammogram for June and we will plan to see the patient back in 6 months or sooner if the need arises  She was instructed to call with any new concerns or symptoms prior to that time  All of her questions were answered today      History of Present Illness:        History of ductal carcinoma in situ (DCIS) of breast    7/17/2018 Biopsy     Right breast biopsy  Upper inner breast  DCIS  Grade 3  ER 0  NV 0  Stage 0      9/12/2018 Surgery     Right mastectomy with sentinel lymph node biopsy  Ductal carcinoma in situ  1 6 cm  Grade 3  ER 0  NC 0  0/1 lymph node  Stage 0    Immediate reconstruction with Dr Harlan De Oliveira (tissue expander)      10/1/2018 - 10/1/2018 Radiation     Radiation therapy not recommended          -Interval History: Patient presents today for a six month follow up visit for right breast cancer  She underwent a left breast augmentation with Dr Harlan De Oliveira in December  She notices no changes on self exam  Denies headaches, back pain, bone pain, cough, SOB, abdominal pain  Review of Systems:  Review of Systems   Constitutional: Negative for activity change, appetite change, chills, fatigue, fever and unexpected weight change  HENT: Negative for trouble swallowing  Eyes: Negative for pain, redness and visual disturbance  Respiratory: Negative for cough, shortness of breath and wheezing  Cardiovascular: Negative for chest pain, palpitations and leg swelling  Gastrointestinal: Negative for abdominal pain, constipation, diarrhea, nausea and vomiting  Endocrine: Negative for cold intolerance and heat intolerance  Musculoskeletal: Negative for arthralgias, back pain, gait problem and myalgias  Skin: Negative for color change and rash  Neurological: Negative for dizziness, syncope, light-headedness, numbness and headaches  Hematological: Negative for adenopathy  Psychiatric/Behavioral: Negative for agitation and confusion  All other systems reviewed and are negative        Patient Active Problem List   Diagnosis    Menopause    Tick bite of back    History of ductal carcinoma in situ (DCIS) of breast    Personal history of malignant neoplasm of breast    Encounter for follow-up examination after completed treatment for malignant neoplasm     Past Medical History:   Diagnosis Date    Breast cancer ([facility] [address] ) 2018    right    Childhood asthma     as a child    PONV (postoperative nausea and vomiting)     Patient requests to be premedicated for N/V that can last 2 weeks post surgeries    Wears glasses      Past Surgical History:   Procedure Laterality Date    AUGMENTATION BREAST Left 12/2/2019    Procedure: BREAST AUGMENTATION;  Surgeon: Kayla Peter MD;  [facility];  Service: Plastics    BREAST IMPLANT Right 4/16/2019    Procedure: BREAST TISSUE EXPANDER/IMPLANT EXCHANGE; BREAST CAPSULECTOMY;  Surgeon: Kayla Peter MD;  [facility];  Service: Plastics    MAMMO STEREOTACTIC BREAST BIOPSY RIGHT (ALL INC) Right 7/17/2018    MASTECTOMY Right 2018    MASTECTOMY W/ SENTINEL NODE BIOPSY Right 9/12/2018    Procedure: BREAST MASTECTOMY; LYMPHATIC MAPPING WITH BLUE DYE AND RADIOACTIVE DYE; SENTINEL LYMPH NODE BIOPSY (INJECT AT 0730 BY DR BALL IN THE OR);   Surgeon: Andreina Ingram MD;  [facility];  Service: Surgical Oncology    CO BREAST RECONSTRUC W TISS EXPANDR Right 9/12/2018    Procedure: BREAST IMMEDIATE RECONSTRUCTION, TISSUE EXPANDER;  Surgeon: Kayla Peter MD;  [facility];  Service: Plastics    CO IMPLNT BIO IMPLNT FOR SOFT TISSUE REINFORCEMENT Right 9/12/2018    Procedure:  Ends;  Surgeon: Kayla Peter MD;  [facility];  Service: Plastics    US GUIDANCE BREAST BIOPSY LEFT EACH ADDITIONAL Left 7/1/2019    US GUIDED BREAST BIOPSY LEFT COMPLETE Left 7/1/2019    WISDOM TOOTH EXTRACTION       Family History   Problem Relation Age of Onset    Hypertension Father     Prostate cancer Father     No Known Problems Sister     Melanoma Sister     No Known Problems Sister     No Known Problems Sister     No Known Problems Maternal Aunt      Social History     Socioeconomic History    Marital status:      Spouse name: Not on file    Number of children: Not on file    Years of education: Not on file    Highest education level: Not on file   Occupational History    Not on file   Social Needs    Financial resource strain: Not on file    Food insecurity:     Worry: Not on file     Inability: Not on file   [employer] needs:     Medical: Not on file     Non-medical: Not on file   Tobacco Use    Smoking status: Never Smoker    Smokeless tobacco: Never Used   Substance and Sexual Activity    Alcohol use: Yes     Frequency: Monthly or less     Drinks per session: 1 or 2     Binge frequency: Never     Comment: 2 drinks/mo    Drug use: No    Sexual activity: Yes     Partners: Male     Birth control/protection: Post-menopausal   Lifestyle    Physical activity:     Days per week: Not on file     Minutes per session: Not on file    Stress: Not on file   Relationships    Social connections:     Talks on phone: Not on file     Gets together: Not on file     Attends [employer] service: Not on file     Active member of club or organization: Not on file     Attends meetings of clubs or organizations: Not on file     Relationship status: Not on file    Intimate partner violence:     Fear of current or ex partner: Not on file     Emotionally abused: Not on file     Physically abused: Not on file     Forced sexual activity: Not on file   Other Topics Concern    Not on file   Social History Narrative    Not on file       Current Outpatient Medications:     Probiotic Product (PROBIOTIC-10 PO), Take 1 capsule by mouth daily in the early morning  , Disp: , Rfl:     oxyCODONE-acetaminophen (PERCOCET) 5-325 mg per tablet, Take 1 tablet by mouth every 4 (four) hours as needed for moderate pain for up to 16 dosesMax Daily Amount: 6 tablets (Patient not taking: Reported on 1/15/2020), Disp: 16 tablet, Rfl: 0  No Known Allergies  Vitals:    01/15/20 0821   BP: 126/80   Pulse: 75   Resp: 16   Temp: 98 2 °F (36 8 °C)   SpO2: 97%       Physical Exam   Constitutional: She is oriented to person, place, and time  Vital signs are normal  She appears well-developed and well-nourished  No distress  HENT:   Head: Normocephalic and atraumatic  Neck: Normal range of motion     Cardiovascular: Normal rate, regular rhythm and normal heart sounds  Pulmonary/Chest: Effort normal and breath sounds normal    Bilateral breasts were examined in the sitting and supine position  Right reconstructed breast with implant present  Left breast with inferior incision s/p implant placement  There are no masses, skin nodules, nipple changes or nipple discharge  There is no bilateral supraclavicular or axillary lymphadenopathy noted  Abdominal: Soft  Normal appearance  She exhibits no mass  There is no hepatosplenomegaly  There is no tenderness  Musculoskeletal: Normal range of motion  Lymphadenopathy:     She has no axillary adenopathy  Right: No supraclavicular adenopathy present  Left: No supraclavicular adenopathy present  Neurological: She is alert and oriented to person, place, and time  Skin: Skin is warm, dry and intact  No rash noted  She is not diaphoretic  Psychiatric: She has a normal mood and affect  Her speech is normal    Vitals reviewed  Advance Care Planning/Advance Directives:  Discussed disease status, cancer treatment plans and/or cancer treatment goals with the patient

## 2020-03-09 ENCOUNTER — OFFICE VISIT (OUTPATIENT)
Dept: PLASTIC SURGERY | Facility: CLINIC | Age: 70
End: 2020-03-09
Payer: MEDICARE

## 2020-03-09 VITALS — BODY MASS INDEX: 19.06 KG/M2 | WEIGHT: 114.4 LBS | HEIGHT: 65 IN | RESPIRATION RATE: 16 BRPM

## 2020-03-09 DIAGNOSIS — Z42.1 AFTERCARE POSTMASTECTOMY FOR BREAST RECONSTRUCTION: Primary | ICD-10-CM

## 2020-03-09 PROCEDURE — 99214 OFFICE O/P EST MOD 30 MIN: CPT | Performed by: SURGERY

## 2020-03-09 NOTE — PROGRESS NOTES
Assessment/Plan:  Please see HPI  Overall, Cecilio Cohen is doing quite well, she is very happy with the results of the right breast reconstruction, as well as the contralateral left breast augmentation for symmetry  We discussed the potential for right nipple-areolar reconstruction versus tattooing, we also talked about the potential for autologous fat grafting  At this point, she would prefer to defer both nipple reconstruction and fat grafting and would instead prefer to proceed with right nipple-areolar complex tattoo  We will make the appropriate referral regarding the we will see her back for a follow-up visit in 3-4 months  There are no diagnoses linked to this encounter  Subjective:      Patient ID: Stephanie Morin is a 81 y o  female  HPI    The following portions of the patient's history were reviewed and updated as appropriate: allergies, current medications, past family history, past medical history, past social history, past surgical history and problem list     Review of Systems   Constitutional: Negative for chills and fever  HENT: Negative for hearing loss  Eyes: Positive for visual disturbance  Negative for discharge  Wears eyeglasses   Respiratory: Negative for chest tightness and shortness of breath  Cardiovascular: Negative for chest pain and leg swelling  Gastrointestinal: Negative for blood in stool, constipation, diarrhea and nausea  Genitourinary: Negative for dysuria  Musculoskeletal: Negative for gait problem  Skin: Negative for rash  Allergic/Immunologic: Negative for immunocompromised state  Neurological: Negative for seizures and headaches  Hematological: Does not bruise/bleed easily  Psychiatric/Behavioral: Negative for dysphoric mood  The patient is not nervous/anxious            Objective:      Resp 16   Ht 5' 5" (1 651 m)   Wt 51 9 kg (114 lb 6 4 oz)   LMP 01/01/2000 (Within Months)   BMI 19 04 kg/m²          Physical Exam Constitutional: She appears well-developed and well-nourished  HENT:   Head: Normocephalic  Eyes: Pupils are equal, round, and reactive to light  Neck: Normal range of motion  Cardiovascular: Normal rate  Pulmonary/Chest: Effort normal    Abdominal: Soft  Musculoskeletal: Normal range of motion  Neurological: She is alert  Skin: Skin is warm  Breast examination reveals the presence of a reconstructed right breast, there is an implant in good position, the breast is soft, supple and without evidence of capsular contracture, the nipple-areolar complex is absent  The left breast reveals implant in place, the breast is soft and supple without evidence of capsular contracture  There is good breast symmetry regard to volume  Psychiatric: She has a normal mood and affect

## 2020-03-09 NOTE — LETTER
March 9, 2020     Andreina Ingram MD  [address] [address]    Patient: Lexi Crenshaw   YOB: 1950   Date of Visit: 3/9/2020       Dear Dr Ronaldo Castleman:    Thank you for referring Darryl Chao to me for evaluation  Below are my notes for this consultation  If you have questions, please do not hesitate to call me  I look forward to following your patient along with you  Sincerely,        Kayla Peter MD        CC: No Recipients  Kayla Peter MD  3/9/2020  3:27 PM  Sign at close encounter  Assessment/Plan:  Please see HPI  Overall, Claudia Vicente is doing quite well, she is very happy with the results of the right breast reconstruction, as well as the contralateral left breast augmentation for symmetry  We discussed the potential for right nipple-areolar reconstruction versus tattooing, we also talked about the potential for autologous fat grafting  At this point, she would prefer to defer both nipple reconstruction and fat grafting and would instead prefer to proceed with right nipple-areolar complex tattoo  We will make the appropriate referral regarding the we will see her back for a follow-up visit in 3-4 months  There are no diagnoses linked to this encounter  Subjective:      Patient ID: Lexi Crenshaw is a 81 y o  female  HPI    The following portions of the patient's history were reviewed and updated as appropriate: allergies, current medications, past family history, past medical history, past social history, past surgical history and problem list     Review of Systems   Constitutional: Negative for chills and fever  HENT: Negative for hearing loss  Eyes: Positive for visual disturbance  Negative for discharge  Wears eyeglasses   Respiratory: Negative for chest tightness and shortness of breath  Cardiovascular: Negative for chest pain and leg swelling  Gastrointestinal: Negative for blood in stool, constipation, diarrhea and nausea  Genitourinary: Negative for dysuria  Musculoskeletal: Negative for gait problem  Skin: Negative for rash  Allergic/Immunologic: Negative for immunocompromised state  Neurological: Negative for seizures and headaches  Hematological: Does not bruise/bleed easily  Psychiatric/Behavioral: Negative for dysphoric mood  The patient is not nervous/anxious  Objective:      Resp 16   Ht 5' 5" (1 651 m)   Wt 51 9 kg (114 lb 6 4 oz)   LMP 01/01/2000 (Within Months)   BMI 19 04 kg/m²           Physical Exam   Constitutional: She appears well-developed and well-nourished  HENT:   Head: Normocephalic  Eyes: Pupils are equal, round, and reactive to light  Neck: Normal range of motion  Cardiovascular: Normal rate  Pulmonary/Chest: Effort normal    Abdominal: Soft  Musculoskeletal: Normal range of motion  Neurological: She is alert  Skin: Skin is warm  Breast examination reveals the presence of a reconstructed right breast, there is an implant in good position, the breast is soft, supple and without evidence of capsular contracture, the nipple-areolar complex is absent  The left breast reveals implant in place, the breast is soft and supple without evidence of capsular contracture  There is good breast symmetry regard to volume  Psychiatric: She has a normal mood and affect

## 2020-06-26 ENCOUNTER — HOSPITAL ENCOUNTER (OUTPATIENT)
Dept: RADIOLOGY | Facility: HOSPITAL | Age: 70
Discharge: HOME | End: 2020-06-26
Payer: MEDICARE

## 2020-06-26 VITALS — HEIGHT: 65 IN | BODY MASS INDEX: 18.99 KG/M2 | WEIGHT: 114 LBS

## 2020-06-26 DIAGNOSIS — Z86.000 HISTORY OF DUCTAL CARCINOMA IN SITU (DCIS) OF BREAST: ICD-10-CM

## 2020-06-26 PROCEDURE — G0279 TOMOSYNTHESIS, MAMMO: HCPCS

## 2020-06-26 PROCEDURE — 77065 DX MAMMO INCL CAD UNI: CPT

## 2020-06-29 ENCOUNTER — OFFICE VISIT (OUTPATIENT)
Dept: PLASTIC SURGERY | Facility: CLINIC | Age: 70
End: 2020-06-29
Payer: MEDICARE

## 2020-06-29 VITALS — BODY MASS INDEX: 18.63 KG/M2 | WEIGHT: 111.8 LBS | RESPIRATION RATE: 16 BRPM | HEIGHT: 65 IN | TEMPERATURE: 97.3 F

## 2020-06-29 DIAGNOSIS — Z42.1 AFTERCARE POSTMASTECTOMY FOR BREAST RECONSTRUCTION: Primary | ICD-10-CM

## 2020-06-29 PROCEDURE — 99215 OFFICE O/P EST HI 40 MIN: CPT | Performed by: SURGERY

## 2020-07-21 ENCOUNTER — TELEPHONE (OUTPATIENT)
Dept: SURGICAL ONCOLOGY | Facility: CLINIC | Age: 70
End: 2020-07-21

## 2020-07-22 ENCOUNTER — OFFICE VISIT (OUTPATIENT)
Dept: SURGICAL ONCOLOGY | Facility: CLINIC | Age: 70
End: 2020-07-22
Payer: MEDICARE

## 2020-07-22 VITALS
HEART RATE: 60 BPM | BODY MASS INDEX: 18.49 KG/M2 | WEIGHT: 111 LBS | TEMPERATURE: 98.7 F | HEIGHT: 65 IN | RESPIRATION RATE: 16 BRPM | DIASTOLIC BLOOD PRESSURE: 80 MMHG | SYSTOLIC BLOOD PRESSURE: 140 MMHG

## 2020-07-22 DIAGNOSIS — Z86.000 HISTORY OF DUCTAL CARCINOMA IN SITU (DCIS) OF BREAST: ICD-10-CM

## 2020-07-22 DIAGNOSIS — Z08 ENCOUNTER FOR FOLLOW-UP EXAMINATION AFTER COMPLETED TREATMENT FOR MALIGNANT NEOPLASM: Primary | ICD-10-CM

## 2020-07-22 PROCEDURE — 99213 OFFICE O/P EST LOW 20 MIN: CPT | Performed by: NURSE PRACTITIONER

## 2020-07-22 RX ORDER — MULTIVIT-MIN/IRON FUM/FOLIC AC 7.5 MG-4
1 TABLET ORAL DAILY
COMMUNITY
End: 2022-08-04 | Stop reason: ALTCHOICE

## 2020-07-22 NOTE — PROGRESS NOTES
Surgical Oncology Follow Up       [address] [facility]  [facility] [place]  [address]  [facility] [address]    Amaya Daly  1950  [id number]  [address]   [facility] [place]  [address] [address]    Chief Complaint   Patient presents with    Breast Cancer     Pt is here for 6 month  f/u       Assessment/Plan:  1  Encounter for follow-up examination after completed treatment for malignant neoplasm    2  History of ductal carcinoma in situ (DCIS) of breast  - 6 mo f/u visit     Discussion/Summary: Patient is a 57-year-old female who presents today for a six-month follow-up visit for right breast cancer diagnosed in July of 2018  Her pathology revealed DCIS, ER/VT negative  She underwent a right mastectomy and a sentinel node biopsy by Dr Sumaya Griffith  She had immediate reconstruction with Dr Karin Vieira  She did not receive adjuvant therapy  She underwent expander implant exchange in April of 2019 and a left breast augmentation in December of 2019  She had a left diagnostic mammogram on June 26, 2020 which was BI-RADS 2  She has no new complaints today and there are no concerns on today's exam   I will plan to see her back in 6 months or sooner if the need arises  She was instructed to call with any new concerns or symptoms prior to that time  All of her questions were answered today        History of Present Illness:        History of ductal carcinoma in situ (DCIS) of breast    7/17/2018 Biopsy     Right breast biopsy  Upper inner breast  DCIS  Grade 3  ER 0  VT 0  Stage 0      9/12/2018 Surgery     Right mastectomy with sentinel lymph node biopsy  Ductal carcinoma in situ  1 6 cm  Grade 3  ER 0  VT 0  0/1 lymph node  Stage 0    Immediate reconstruction with Dr Karin Vieira (tissue expander)      10/1/2018 - 10/1/2018 Radiation     Radiation therapy not recommended          -Interval History:  Patient presents today for a follow-up visit for right breast cancer diagnosed in 2018  She notices no changes on self-exam   Denies headaches, back pain, bone pain, cough, shortness of breath, abdominal pain  Left diagnostic mammogram was BI-RADS 2  Review of Systems:  Review of Systems   Constitutional: Negative for activity change, appetite change, chills, fatigue, fever and unexpected weight change  HENT: Negative for trouble swallowing  Eyes: Negative for pain, redness and visual disturbance  Respiratory: Negative for cough, shortness of breath and wheezing  Cardiovascular: Negative for chest pain, palpitations and leg swelling  Gastrointestinal: Negative for abdominal pain, constipation, diarrhea, nausea and vomiting  Endocrine: Negative for cold intolerance and heat intolerance  Musculoskeletal: Negative for arthralgias, back pain, gait problem and myalgias  Skin: Negative for color change and rash  Neurological: Negative for dizziness, syncope, light-headedness, numbness and headaches  Hematological: Negative for adenopathy  Psychiatric/Behavioral: Negative for agitation and confusion  All other systems reviewed and are negative        Patient Active Problem List   Diagnosis    Menopause    Tick bite of back    History of ductal carcinoma in situ (DCIS) of breast    Personal history of malignant neoplasm of breast    Encounter for follow-up examination after completed treatment for malignant neoplasm     Past Medical History:   Diagnosis Date    Breast cancer ([facility] [address] ) 2018    right    Childhood asthma     as a child    PONV (postoperative nausea and vomiting)     Patient requests to be premedicated for N/V that can last 2 weeks post surgeries    Wears glasses      Past Surgical History:   Procedure Laterality Date    AUGMENTATION BREAST Left 12/2/2019    Procedure: BREAST AUGMENTATION;  Surgeon: Naga Reyes MD;  [facility];  Service: Plastics    BREAST IMPLANT Right 4/16/2019    Procedure: BREAST TISSUE EXPANDER/IMPLANT EXCHANGE; BREAST CAPSULECTOMY;  Surgeon: Ratna Granda MD;  [facility];  Service: Plastics    MAMMO STEREOTACTIC BREAST BIOPSY RIGHT (ALL INC) Right 7/17/2018    MASTECTOMY Right 2018    MASTECTOMY W/ SENTINEL NODE BIOPSY Right 9/12/2018    Procedure: BREAST MASTECTOMY; LYMPHATIC MAPPING WITH BLUE DYE AND RADIOACTIVE DYE; SENTINEL LYMPH NODE BIOPSY (INJECT AT 0730 BY DR BALL IN THE OR);   Surgeon: Rober Cole MD;  [facility];  Service: Surgical Oncology    NC BREAST RECONSTRUC W TISS EXPANDR Right 9/12/2018    Procedure: BREAST IMMEDIATE RECONSTRUCTION, TISSUE EXPANDER;  Surgeon: Ratna Granda MD;  [facility];  Service: Plastics    NC IMPLNT BIO IMPLNT FOR SOFT TISSUE REINFORCEMENT Right 9/12/2018    Procedure: Jcarlos Ibarra;  Surgeon: Ratna Granda MD;  [facility];  Service: Plastics    US GUIDANCE BREAST BIOPSY LEFT EACH ADDITIONAL Left 7/1/2019    US GUIDED BREAST BIOPSY LEFT COMPLETE Left 7/1/2019    WISDOM TOOTH EXTRACTION       Family History   Problem Relation Age of Onset    Hypertension Father     Prostate cancer Father     No Known Problems Sister     Melanoma Sister     No Known Problems Sister     No Known Problems Sister     No Known Problems Maternal Aunt      Social History     Socioeconomic History    Marital status:      Spouse name: Not on file    Number of children: Not on file    Years of education: Not on file    Highest education level: Not on file   Occupational History    Not on file   Social Needs    Financial resource strain: Not on file    Food insecurity:     Worry: Not on file     Inability: Not on file    Transportation needs:     Medical: Not on file     Non-medical: Not on file   Tobacco Use    Smoking status: Never Smoker    Smokeless tobacco: Never Used   Substance and Sexual Activity    Alcohol use: Yes     Frequency: Monthly or less     Drinks per session: 1 or 2     Binge frequency: Never     Comment: 2 drinks/mo    Drug use: No    Sexual activity: Yes     Partners: Male     Birth control/protection: Post-menopausal   Lifestyle    Physical activity:     Days per week: Not on file     Minutes per session: Not on file    Stress: Not on file   Relationships    Social connections:     Talks on phone: Not on file     Gets together: Not on file     Attends Jewish service: Not on file     Active member of club or organization: Not on file     Attends meetings of clubs or organizations: Not on file     Relationship status: Not on file    Intimate partner violence:     Fear of current or ex partner: Not on file     Emotionally abused: Not on file     Physically abused: Not on file     Forced sexual activity: Not on file   Other Topics Concern    Not on file   Social History Narrative    Not on file       Current Outpatient Medications:     Multiple Vitamins-Minerals (MULTIVITAMIN WITH MINERALS) tablet, Take 1 tablet by mouth daily, Disp: , Rfl:     Probiotic Product (PROBIOTIC-10 PO), Take 1 capsule by mouth daily in the early morning  , Disp: , Rfl:   No Known Allergies  Vitals:    07/22/20 0929   BP: 140/80   Pulse: 60   Resp: 16   Temp: 98 7 °F (37 1 °C)       Physical Exam   Constitutional: She is oriented to person, place, and time  Vital signs are normal  She appears well-developed and well-nourished  No distress  HENT:   Head: Normocephalic and atraumatic  Neck: Normal range of motion  Cardiovascular: Normal rate, regular rhythm and normal heart sounds  Pulmonary/Chest: Effort normal and breath sounds normal    Bilateral breasts were examined in the sitting and supine position  Right reconstructed breast with implant present  Left breast with inferior incision s/p implant placement  There are no masses, skin nodules, nipple changes or nipple discharge   There is no bilateral supraclavicular or axillary lymphadenopathy noted  Abdominal: Soft  Normal appearance  She exhibits no mass  There is no hepatosplenomegaly  There is no tenderness  Musculoskeletal: Normal range of motion  Lymphadenopathy:     She has no axillary adenopathy  Right: No supraclavicular adenopathy present  Left: No supraclavicular adenopathy present  Neurological: She is alert and oriented to person, place, and time  Skin: Skin is warm, dry and intact  No rash noted  She is not diaphoretic  Psychiatric: She has a normal mood and affect  Her speech is normal    Vitals reviewed  Results:    Imaging  Mammo Diagnostic Left W 3d & Cad    Result Date: 6/26/2020  Narrative: DIAGNOSIS: History of ductal carcinoma in situ (DCIS) of breast TECHNIQUE: Digital screening mammography was performed  Computer Aided Detection (CAD) analyzed all applicable images  COMPARISONS: Prior breast imaging dated: 07/01/2019, 07/01/2019, 07/01/2019, 06/24/2019, 06/24/2019, 07/17/2018, 07/17/2018, 07/17/2018, 07/02/2018, 06/22/2018, and 06/16/2017 RELEVANT HISTORY: Family Breast Cancer History: No known family history of breast cancer  Family Medical History: No known relevant family medical history  Personal History: No known relevant hormone history  Surgical history includes mastectomy  Medical history includes breast cancer  RISK ASSESSMENT: 5 Year Tyrer-Cuzick: No Score 10 Year Tyrer-Cuzick: No Score Lifetime Tyrer-Cuzick: No Score TISSUE DENSITY: The breasts are extremely dense, which lowers the sensitivity of mammography  INDICATION: Keke Paulson is a 81 y o  female presenting for annual  FINDINGS: There are no suspicious masses, grouped microcalcifications or areas of architectural distortion  The skin and nipple areolar complex are unremarkable  Numerous small calcifications are present in the breast   There are 2 biopsy marker clips in the breast   The calcifications appear stable  right implant(s) have been identified  Retropectoral silicone right implant(s) have been identified  Impression:  New left-sided implant  Stable appearing calcifications  Biopsy marker clips noted  No suspicious new abnormality  ASSESSMENT/BI-RADS CATEGORY: Left: 2 - Benign Overall: 2 - Benign RECOMMENDATION:      - Diagnostic mammogram in 1 year for the left breast  Workstation ID: [id number]      I reviewed the above imaging data  Advance Care Planning/Advance Directives:  Discussed disease status, cancer treatment plans and/or cancer treatment goals with the patient

## 2020-12-15 ENCOUNTER — TELEPHONE (OUTPATIENT)
Dept: PLASTIC SURGERY | Facility: CLINIC | Age: 70
End: 2020-12-15

## 2021-02-01 ENCOUNTER — TELEPHONE (OUTPATIENT)
Dept: HEMATOLOGY ONCOLOGY | Facility: CLINIC | Age: 71
End: 2021-02-01

## 2021-02-01 NOTE — TELEPHONE ENCOUNTER
Patient states that she received a call to reschedule her 2/2 8:30 a m appt with Karissa Torres  Best call back [phone]

## 2021-02-03 ENCOUNTER — TELEPHONE (OUTPATIENT)
Dept: PLASTIC SURGERY | Facility: HOSPITAL | Age: 71
End: 2021-02-03

## 2021-02-03 ENCOUNTER — OFFICE VISIT (OUTPATIENT)
Dept: SURGICAL ONCOLOGY | Facility: CLINIC | Age: 71
End: 2021-02-03
Payer: MEDICARE

## 2021-02-03 VITALS
SYSTOLIC BLOOD PRESSURE: 128 MMHG | HEIGHT: 65 IN | WEIGHT: 113 LBS | RESPIRATION RATE: 16 BRPM | BODY MASS INDEX: 18.83 KG/M2 | HEART RATE: 84 BPM | TEMPERATURE: 98 F | DIASTOLIC BLOOD PRESSURE: 80 MMHG

## 2021-02-03 DIAGNOSIS — Z86.000 HISTORY OF DUCTAL CARCINOMA IN SITU (DCIS) OF BREAST: ICD-10-CM

## 2021-02-03 DIAGNOSIS — Z08 ENCOUNTER FOR FOLLOW-UP EXAMINATION AFTER COMPLETED TREATMENT FOR MALIGNANT NEOPLASM: Primary | ICD-10-CM

## 2021-02-03 PROCEDURE — 99213 OFFICE O/P EST LOW 20 MIN: CPT | Performed by: NURSE PRACTITIONER

## 2021-02-03 NOTE — PROGRESS NOTES
Surgical Oncology Follow Up       [facility]  [facility] [place]  [address]  [facility] [address]    Alexis Child Walk  1950  [id number]  [address]  [facility] [place]  [place] [address]    Chief Complaint   Patient presents with    Breast Cancer     Pt is here for a six month follow up       Assessment/Plan:  1  Encounter for follow-up examination after completed treatment for malignant neoplasm    2  History of ductal carcinoma in situ (DCIS) of breast  - Mammo diagnostic left w 3d & cad; Future  - 6 mo f/u visit    Discussion/Summary: Patient is a 45-year-old female who presents today for a six-month follow-up visit for right breast cancer diagnosed in July of 2018  Her pathology revealed DCIS, ER/SD negative   She underwent a right mastectomy and a sentinel node biopsy by Dr Odessa Cordero had immediate reconstruction with Dr Eloise Cordero did not receive adjuvant therapy   She underwent the expander/implant exchange in April of 2019 and a left breast augmentation in December of 2019  She had a left diagnostic mammogram on June 26, 2020 which was BI-RADS 2  She has no new complaints today and there are no concerns on today's exam   I will order a left diagnostic mammogram for June and plan to see the patient back again in 6 months or sooner should the need arise  She was instructed to call with any new concerns or symptoms prior to that time  All of questions were answered today          History of Present Illness:     Oncology History   History of ductal carcinoma in situ (DCIS) of breast   7/17/2018 Biopsy    Right breast biopsy  Upper inner breast  DCIS  Grade 3  ER 0  SD 0  Stage 0     9/12/2018 Surgery    Right mastectomy with sentinel lymph node biopsy  Ductal carcinoma in situ  1 6 cm  Grade 3  ER 0  SD 0  0/1 lymph node  Stage 0    Immediate reconstruction with Dr Eloise Kaiser (tissue expander)     10/1/2018 - 10/1/2018 Radiation    Radiation therapy not recommended          -Interval History:  Patient presents today for follow-up visit for right breast cancer diagnosed in 2018  She notices no changes on her self exam   She denies persistent headaches, back pain, bone pain, cough, shortness of breath, abdominal pain  Review of Systems:  Review of Systems   Constitutional: Negative for activity change, appetite change, chills, fatigue, fever and unexpected weight change  Respiratory: Negative for cough and shortness of breath  Cardiovascular: Negative for chest pain  Gastrointestinal: Negative for abdominal pain, constipation, diarrhea, nausea and vomiting  Musculoskeletal: Negative for arthralgias, back pain, gait problem and myalgias  Skin: Negative for color change and rash  Neurological: Negative for dizziness and headaches  Hematological: Negative for adenopathy  Psychiatric/Behavioral: Negative for agitation and confusion  All other systems reviewed and are negative        Patient Active Problem List   Diagnosis    Menopause    Tick bite of back    History of ductal carcinoma in situ (DCIS) of breast    Personal history of malignant neoplasm of breast    Encounter for follow-up examination after completed treatment for malignant neoplasm     Past Medical History:   Diagnosis Date    Breast cancer ([facility] [address] ) 2018    right    Childhood asthma     as a child    PONV (postoperative nausea and vomiting)     Patient requests to be premedicated for N/V that can last 2 weeks post surgeries    Wears glasses      Past Surgical History:   Procedure Laterality Date    AUGMENTATION BREAST Left 12/2/2019    Procedure: BREAST AUGMENTATION;  Surgeon: Larisa Law MD;  [facility];  Service: Plastics    BREAST IMPLANT Right 4/16/2019    Procedure: BREAST TISSUE EXPANDER/IMPLANT EXCHANGE; BREAST CAPSULECTOMY;  Surgeon: Larisa Law MD;  [facility];  Service: Plastics    MAMMO STEREOTACTIC BREAST BIOPSY RIGHT (ALL INC) Right 7/17/2018    MASTECTOMY Right 2018    MASTECTOMY W/ SENTINEL NODE BIOPSY Right 9/12/2018    Procedure: BREAST MASTECTOMY; LYMPHATIC MAPPING WITH BLUE DYE AND RADIOACTIVE DYE; SENTINEL LYMPH NODE BIOPSY (INJECT AT 0730 BY DR BALL IN THE OR);   Surgeon: Gerald Samaniego MD;  [facility];  Service: Surgical Oncology    NM IMPLNT BIO IMPLNT FOR SOFT TISSUE REINFORCEMENT Right 9/12/2018    Procedure: ACELLULAR DERMAL MATRIX;  Surgeon: Jason Gonzalez MD;  [facility];  Service: Plastics    NM TISSUE EXPANDER PLACEMENT BREAST RECONSTRUCTION Right 9/12/2018    Procedure: BREAST IMMEDIATE RECONSTRUCTION, TISSUE EXPANDER;  Surgeon: Jason Gonzalez MD;  [facility];  Service: Plastics    US GUIDANCE BREAST BIOPSY LEFT EACH ADDITIONAL Left 7/1/2019    US GUIDED BREAST BIOPSY LEFT COMPLETE Left 7/1/2019    WISDOM TOOTH EXTRACTION       Family History   Problem Relation Age of Onset    Hypertension Father     Prostate cancer Father     No Known Problems Sister     Melanoma Sister     No Known Problems Sister     No Known Problems Sister     No Known Problems Maternal Aunt      Social History     Socioeconomic History    Marital status:      Spouse name: Not on file    Number of children: Not on file    Years of education: Not on file    Highest education level: Not on file   Occupational History    Not on file   Social Needs    Financial resource strain: Not on file    Food insecurity     Worry: Not on file     Inability: Not on file   [employer] needs     Medical: Not on file     Non-medical: Not on file   Tobacco Use    Smoking status: Never Smoker    Smokeless tobacco: Never Used   Substance and Sexual Activity    Alcohol use: Yes     Frequency: Monthly or less     Drinks per session: 1 or 2     Binge frequency: Never     Comment: 2 drinks/mo    Drug use: No    Sexual activity: Yes     Partners: Male     Birth control/protection: Post-menopausal   Lifestyle    Physical activity     Days per week: Not on file     Minutes per session: Not on file    Stress: Not on file   Relationships    Social connections     Talks on phone: Not on file     Gets together: Not on file     Attends [employer] service: Not on file     Active member of club or organization: Not on file     Attends meetings of clubs or organizations: Not on file     Relationship status: Not on file    Intimate partner violence     Fear of current or ex partner: Not on file     Emotionally abused: Not on file     Physically abused: Not on file     Forced sexual activity: Not on file   Other Topics Concern    Not on file   Social History Narrative    Not on file       Current Outpatient Medications:     Multiple Vitamins-Minerals (MULTIVITAMIN WITH MINERALS) tablet, Take 1 tablet by mouth daily, Disp: , Rfl:     Probiotic Product (PROBIOTIC-10 PO), Take 1 capsule by mouth daily in the early morning  , Disp: , Rfl:   No Known Allergies  Vitals:    02/03/21 1348   BP: 128/80   Pulse: 84   Resp: 16   Temp: 98 °F (36 7 °C)       Physical Exam  Vitals signs reviewed  Constitutional:       General: She is not in acute distress  Appearance: Normal appearance  She is well-developed  She is not diaphoretic  HENT:      Head: Normocephalic and atraumatic  Neck:      Musculoskeletal: Normal range of motion  Cardiovascular:      Rate and Rhythm: Normal rate and regular rhythm  Heart sounds: Normal heart sounds  Pulmonary:      Effort: Pulmonary effort is normal       Breath sounds: Normal breath sounds  Chest:      Breasts:         Right: Skin change (surgical scars) present  Left: Skin change (surgical scar) present  No swelling, bleeding, inverted nipple, mass, nipple discharge or tenderness  Comments: Right reconstructed breast with implant present    There are no dominant masses, skin changes or lesions  Abdominal:      Palpations: Abdomen is soft  There is no mass  Tenderness: There is no abdominal tenderness  Musculoskeletal: Normal range of motion  Lymphadenopathy:      Upper Body:      Right upper body: No supraclavicular or axillary adenopathy  Left upper body: No supraclavicular or axillary adenopathy  Skin:     General: Skin is warm and dry  Findings: No rash  Neurological:      Mental Status: She is alert and oriented to person, place, and time  Psychiatric:         Speech: Speech normal          Advance Care Planning/Advance Directives:  Discussed disease status, cancer treatment plans and/or cancer treatment goals with the patient

## 2021-02-13 DIAGNOSIS — Z23 ENCOUNTER FOR IMMUNIZATION: ICD-10-CM

## 2021-02-19 ENCOUNTER — OFFICE VISIT (OUTPATIENT)
Dept: PLASTIC SURGERY | Facility: CLINIC | Age: 71
End: 2021-02-19
Payer: MEDICARE

## 2021-02-19 VITALS — HEIGHT: 65 IN | WEIGHT: 113 LBS | BODY MASS INDEX: 18.83 KG/M2 | TEMPERATURE: 97 F

## 2021-02-19 DIAGNOSIS — Z85.3 PERSONAL HISTORY OF MALIGNANT NEOPLASM OF BREAST: Primary | ICD-10-CM

## 2021-02-19 DIAGNOSIS — Z98.890 STATUS POST RIGHT BREAST RECONSTRUCTION: ICD-10-CM

## 2021-02-19 PROCEDURE — 99212 OFFICE O/P EST SF 10 MIN: CPT | Performed by: PHYSICIAN ASSISTANT

## 2021-02-19 NOTE — LETTER
February 19, 2021     Adri Garrison MD  [address] [address]    Patient: Ned Daly   YOB: 1950   Date of Visit: 2/19/2021       Dear Dr Franklin Cormier:    Thank you for referring Herman Villatoro to me for evaluation  Below are my notes for this consultation  If you have questions, please do not hesitate to call me  I look forward to following your patient along with you  Sincerely,        Hernandez Rivas PA-C        CC: MD Hernandez Rankin PA-C  2/19/2021  9:15 AM  Sign when Signing Visit  Assessment/Plan:   Darcie Artis is a 78year old female who is status post right mastectomy and immediate reconstruction with a tissue expander and acellular dermis from July 2018 (Dr Epi Higuera), tissue expander/implant exchange capsulectomy in April of 2019, and a contralateral/balancing left breast augmentation in December of 2019  Please see HPI  She is pleased with her results  She can continue with implant displacement exercises  We discussed fat grafting however, she is pleased with her results and not interested  We will see her back on a yearly basis  She is encouraged to follow up sooner with any concerns  Diagnoses and all orders for this visit:    Personal history of malignant neoplasm of breast    Status post right breast reconstruction          Subjective:     Patient ID: Frankie Santos is a 78 y o  female  HPI   She is very happy with her results  Most recently she had nipple tattooing done in September  Review of Systems   Skin:        As per HPI  Objective:     Physical Exam  Skin:     Comments: Right breast is soft and supple  She has some mild upper pole hollowing and rippling from the implant  No evidence for capsular contracture  Nipple tattoo noted  Please see photos

## 2021-06-30 ENCOUNTER — HOSPITAL ENCOUNTER (OUTPATIENT)
Dept: RADIOLOGY | Facility: HOSPITAL | Age: 71
Discharge: HOME | End: 2021-06-30
Payer: MEDICARE

## 2021-06-30 VITALS — WEIGHT: 112 LBS | HEIGHT: 65 IN | BODY MASS INDEX: 18.66 KG/M2

## 2021-06-30 DIAGNOSIS — Z86.000 HISTORY OF DUCTAL CARCINOMA IN SITU (DCIS) OF BREAST: ICD-10-CM

## 2021-06-30 PROCEDURE — 77065 DX MAMMO INCL CAD UNI: CPT

## 2021-06-30 PROCEDURE — G0279 TOMOSYNTHESIS, MAMMO: HCPCS

## 2021-08-10 ENCOUNTER — OFFICE VISIT (OUTPATIENT)
Dept: SURGICAL ONCOLOGY | Facility: CLINIC | Age: 71
End: 2021-08-10
Payer: MEDICARE

## 2021-08-10 VITALS
RESPIRATION RATE: 12 BRPM | SYSTOLIC BLOOD PRESSURE: 122 MMHG | HEART RATE: 60 BPM | DIASTOLIC BLOOD PRESSURE: 80 MMHG | OXYGEN SATURATION: 98 % | BODY MASS INDEX: 18.83 KG/M2 | TEMPERATURE: 96.8 F | HEIGHT: 65 IN | WEIGHT: 113 LBS

## 2021-08-10 DIAGNOSIS — Z86.000 HISTORY OF DUCTAL CARCINOMA IN SITU (DCIS) OF BREAST: ICD-10-CM

## 2021-08-10 DIAGNOSIS — Z08 ENCOUNTER FOR FOLLOW-UP EXAMINATION AFTER COMPLETED TREATMENT FOR MALIGNANT NEOPLASM: Primary | ICD-10-CM

## 2021-08-10 PROCEDURE — 99213 OFFICE O/P EST LOW 20 MIN: CPT | Performed by: NURSE PRACTITIONER

## 2021-08-10 NOTE — PROGRESS NOTES
Surgical Oncology Follow Up       [address] [facility]  [facility] [place]  [address] [facility] [address]  [facility] [address]    Lorena Patel Walk  1950  [id number]  [address]  [facility] [place]  [address] [address]    Chief Complaint   Patient presents with    Follow-up     6 month        Assessment/Plan:  1  Encounter for follow-up examination after completed treatment for malignant neoplasm    2  History of ductal carcinoma in situ (DCIS) of breast  - 6 mo f/u visit    Discussion/Summary: Patient is a 80-year-old female who presents today for a six-month follow-up visit for right breast cancer diagnosed in July of 2018  Her pathology revealed DCIS, ER/MN negative   She underwent a right mastectomy and a sentinel node biopsy by Dr Severa Liberty Etter Maudlin had immediate reconstruction with Dr Celsa Samuels did not receive adjuvant therapy   She underwent the expander/implant exchange in April of 2019 and a left breast augmentation in December of 2019  She had a left diagnostic mammogram on June 30, 2021 which was BI-RADS 2, category 4 density  She offers no new complaints today and there are no concerns on today's exam   I will plan to see her back in 6 months or sooner should the need arise  She was were answered today      History of Present Illness:     Oncology History   History of ductal carcinoma in situ (DCIS) of breast   7/17/2018 Biopsy    Right breast biopsy  Upper inner breast  DCIS  Grade 3  ER 0  MN 0  Stage 0     9/12/2018 Surgery    Right mastectomy with sentinel lymph node biopsy  Ductal carcinoma in situ  1 6 cm  Grade 3  ER 0  MN 0  0/1 lymph node  Stage 0    Immediate reconstruction with Dr Celsa Robledo (tissue expander)     10/1/2018 - 10/1/2018 Radiation    Radiation therapy not recommended          -Interval History:  Patient notices no changes on self-breast exam   Denies persistent headaches, back pain or bone pain, cough or shortness of breath, abdominal pain  She had a left 3D diagnostic mammogram on June 30, 2021 which was BI-RADS 2, category 4 density  Review of Systems:  Review of Systems   Constitutional: Negative for activity change, appetite change, chills, fatigue, fever and unexpected weight change  Respiratory: Negative for cough and shortness of breath  Cardiovascular: Negative for chest pain  Gastrointestinal: Negative for abdominal pain, constipation, diarrhea, nausea and vomiting  Musculoskeletal: Negative for arthralgias, back pain, gait problem and myalgias  Skin: Negative for color change and rash  Neurological: Negative for dizziness and headaches  Hematological: Negative for adenopathy  Psychiatric/Behavioral: Negative for agitation and confusion  All other systems reviewed and are negative        Patient Active Problem List   Diagnosis    Menopause    Tick bite of back    History of ductal carcinoma in situ (DCIS) of breast    Personal history of malignant neoplasm of breast    Encounter for follow-up examination after completed treatment for malignant neoplasm     Past Medical History:   Diagnosis Date    Breast cancer ([facility] [address] ) 2018    right    Childhood asthma     as a child    PONV (postoperative nausea and vomiting)     Patient requests to be premedicated for N/V that can last 2 weeks post surgeries    Wears glasses      Past Surgical History:   Procedure Laterality Date    AUGMENTATION BREAST Left 12/2/2019    Procedure: BREAST AUGMENTATION;  Surgeon: Kristen Armijo MD;  [facility];  Service: Plastics    BREAST CYST ASPIRATION Left     2019    BREAST IMPLANT Right 4/16/2019    Procedure: BREAST TISSUE EXPANDER/IMPLANT EXCHANGE; BREAST CAPSULECTOMY;  Surgeon: Kristen Armijo MD;  [facility];  Service: Plastics    MAMMO STEREOTACTIC BREAST BIOPSY RIGHT (ALL INC) Right 7/17/2018    MASTECTOMY Right 2018    MASTECTOMY W/ SENTINEL NODE BIOPSY Right 9/12/2018    Procedure: BREAST MASTECTOMY; LYMPHATIC MAPPING WITH BLUE DYE AND RADIOACTIVE DYE; SENTINEL LYMPH NODE BIOPSY (INJECT AT 0730 BY DR BALL IN THE OR);   Surgeon: Fernanda Black MD;  [facility];  Service: Surgical Oncology    RI IMPLNT BIO IMPLNT FOR SOFT TISSUE REINFORCEMENT Right 9/12/2018    Procedure: ACELLULAR DERMAL MATRIX;  Surgeon: Cely Domínguez MD;  [facility];  Service: Plastics    RI TISSUE EXPANDER PLACEMENT BREAST RECONSTRUCTION Right 9/12/2018    Procedure: BREAST IMMEDIATE RECONSTRUCTION, TISSUE EXPANDER;  Surgeon: Cely Domínguez MD;  [facility];  Service: Plastics    US GUIDANCE BREAST BIOPSY LEFT EACH ADDITIONAL Left 7/1/2019    US GUIDED BREAST BIOPSY LEFT COMPLETE Left 7/1/2019    WISDOM TOOTH EXTRACTION       Family History   Problem Relation Age of Onset    Hypertension Father     Prostate cancer Father     No Known Problems Sister     Melanoma Sister     No Known Problems Sister     No Known Problems Sister     No Known Problems Maternal Aunt      Social History     Socioeconomic History    Marital status:      Spouse name: Not on file    Number of children: Not on file    Years of education: Not on file    Highest education level: Not on file   Occupational History    Not on file   Tobacco Use    Smoking status: Never Smoker    Smokeless tobacco: Never Used   Vaping Use    Vaping Use: Never used   Substance and Sexual Activity    Alcohol use: Yes     Comment: 2 drinks/mo    Drug use: No    Sexual activity: Yes     Partners: Male     Birth control/protection: Post-menopausal   Other Topics Concern    Not on file   Social History Narrative    Not on file     Social Determinants of Health     Financial Resource Strain:     Difficulty of Paying Living Expenses:    Food Insecurity:     Worried About Running Out of Food in the Last Year:     Ran Out of Food in the Last Year:    Transportation Needs:  Lack of Transportation (Medical):  Lack of Transportation (Non-Medical):    Physical Activity:     Days of Exercise per Week:     Minutes of Exercise per Session:    Stress:     Feeling of Stress :    Social Connections:     Frequency of Communication with Friends and Family:     Frequency of Social Gatherings with Friends and Family:     Attends Scientologist Services:     Active Member of Clubs or Organizations:     Attends Club or Organization Meetings:     Marital Status:    Intimate Partner Violence:     Fear of Current or Ex-Partner:     Emotionally Abused:     Physically Abused:     Sexually Abused:        Current Outpatient Medications:     Multiple Vitamins-Minerals (MULTIVITAMIN WITH MINERALS) tablet, Take 1 tablet by mouth daily, Disp: , Rfl:     Probiotic Product (PROBIOTIC-10 PO), Take 1 capsule by mouth daily in the early morning  , Disp: , Rfl:   No Known Allergies  Vitals:    08/10/21 1348   BP: 122/80   Pulse: 60   Resp: 12   Temp: (!) 96 8 °F (36 °C)   SpO2: 98%       Physical Exam  Vitals reviewed  Constitutional:       General: She is not in acute distress  Appearance: Normal appearance  She is well-developed  She is not diaphoretic  HENT:      Head: Normocephalic and atraumatic  Cardiovascular:      Rate and Rhythm: Normal rate and regular rhythm  Heart sounds: Normal heart sounds  Pulmonary:      Effort: Pulmonary effort is normal       Breath sounds: Normal breath sounds  Chest:      Breasts:         Right: Skin change (surgical scars) present  Left: Skin change (surgical scar) present  No swelling, bleeding, inverted nipple, mass, nipple discharge or tenderness  Comments: Right reconstructed breast with implant present  There are no dominant masses, skin changes or lesions  Abdominal:      Palpations: Abdomen is soft  There is no mass  Tenderness: There is no abdominal tenderness     Musculoskeletal:         General: Normal range of motion  Cervical back: Normal range of motion  Lymphadenopathy:      Upper Body:      Right upper body: No supraclavicular or axillary adenopathy  Left upper body: No supraclavicular or axillary adenopathy  Skin:     General: Skin is warm and dry  Findings: No rash  Neurological:      Mental Status: She is alert and oriented to person, place, and time  Psychiatric:         Speech: Speech normal            Advance Care Planning/Advance Directives:  Discussed disease status, cancer treatment plans and/or cancer treatment goals with the patient

## 2022-01-10 ENCOUNTER — OFFICE VISIT (OUTPATIENT)
Dept: PLASTIC SURGERY | Facility: CLINIC | Age: 72
End: 2022-01-10
Payer: MEDICARE

## 2022-01-10 DIAGNOSIS — Z85.3 PERSONAL HISTORY OF MALIGNANT NEOPLASM OF BREAST: Primary | ICD-10-CM

## 2022-01-10 DIAGNOSIS — Z98.890 STATUS POST RIGHT BREAST RECONSTRUCTION: ICD-10-CM

## 2022-01-10 PROCEDURE — 99212 OFFICE O/P EST SF 10 MIN: CPT | Performed by: PHYSICIAN ASSISTANT

## 2022-01-10 NOTE — PROGRESS NOTES
Assessment/Plan:   Theodora Cook is a 73year old female who is status post right mastectomy and immediate reconstruction with a tissue expander and acellular dermis from July 2018 (Dr Natalie Velasquez), tissue expander/implant exchange capsulectomy in April of 2019, and a contralateral/balancing left breast augmentation in December of 2019  Please see HPI  She is pleased with her results  She is not interested in any autologous fat grafting  She will continue to follow up with us on a yearly basis  She knows that she can follow up sooner with any concerns  Diagnoses and all orders for this visit:    Personal history of malignant neoplasm of breast    Status post right breast reconstruction          Subjective:     Patient ID: Kirk Tim is a 73 y o  female  HPI   She denies any complaints regarding her right breast reconstruction  She is pleased with her results  Review of Systems   Skin:        As per HPI  Objective:     Physical Exam  Skin:     Comments: Right breast reconstruction with silicone implant in place  She has a nipple tattoo  The breast is soft and supple  She has good symmetry  Please see photos

## 2022-02-17 ENCOUNTER — OFFICE VISIT (OUTPATIENT)
Dept: SURGICAL ONCOLOGY | Facility: CLINIC | Age: 72
End: 2022-02-17
Payer: MEDICARE

## 2022-02-17 VITALS
SYSTOLIC BLOOD PRESSURE: 146 MMHG | OXYGEN SATURATION: 97 % | HEART RATE: 77 BPM | RESPIRATION RATE: 16 BRPM | WEIGHT: 114 LBS | DIASTOLIC BLOOD PRESSURE: 90 MMHG | HEIGHT: 65 IN | BODY MASS INDEX: 18.99 KG/M2 | TEMPERATURE: 96.6 F

## 2022-02-17 DIAGNOSIS — Z08 ENCOUNTER FOR FOLLOW-UP EXAMINATION AFTER COMPLETED TREATMENT FOR MALIGNANT NEOPLASM: ICD-10-CM

## 2022-02-17 DIAGNOSIS — Z86.000 HISTORY OF DUCTAL CARCINOMA IN SITU (DCIS) OF BREAST: Primary | ICD-10-CM

## 2022-02-17 PROCEDURE — 99213 OFFICE O/P EST LOW 20 MIN: CPT | Performed by: NURSE PRACTITIONER

## 2022-02-17 NOTE — PROGRESS NOTES
Surgical Oncology Follow Up       [address] [facility]  [facility] [place]  [address]  [place] [address]    Tiffani Cam Walk  1950  [id number]  [address]  [facility] [place]  [address] [address]    Chief Complaint   Patient presents with    Breast Cancer     6 month follow up        Assessment/Plan:  1  History of ductal carcinoma in situ (DCIS) of breast  - Mammo diagnostic left w 3d & cad; Future  - 6 mo f/u visit    2  Encounter for follow-up examination after completed treatment for malignant neoplasm     Discussion/Summary: Patient is a 82-year-old female who presents today for a six-month follow-up visit for right breast cancer diagnosed in July of 2018  Her pathology revealed DCIS, ER/DC negative   She underwent a right mastectomy and a sentinel node biopsy by Dr Mary Prasad had immediate reconstruction with Dr Kirstie Prasad did not receive adjuvant therapy   She underwent the expander/implant exchange in April of 2019 and a left breast augmentation in December of 2019  She had a left diagnostic mammogram on June 30, 2021 which was BI-RADS 2, category 4 density  She offers no new complaints today and there are no concerns on today's exam   We will plan to see her back in 6 months or sooner should the need arise    She was were answered today        History of Present Illness:     Oncology History   History of ductal carcinoma in situ (DCIS) of breast   7/17/2018 Biopsy    Right breast biopsy  Upper inner breast  DCIS  Grade 3  ER 0  DC 0  Stage 0     9/12/2018 Surgery    Right mastectomy with sentinel lymph node biopsy  Ductal carcinoma in situ  1 6 cm  Grade 3  ER 0  DC 0  0/1 lymph node  Stage 0    Immediate reconstruction with Dr Kirstie Portillo (tissue expander)     10/1/2018 - 10/1/2018 Radiation    Radiation therapy not recommended          -Interval History:  Patient presents today for follow-up visit for right breast cancer  She notices no changes on her self breast exam   Denies persistent headaches, back pain or bone pain, cough shortness of breath, abdominal pain  Review of Systems:  Review of Systems   Constitutional: Negative for activity change, appetite change, chills, fatigue, fever and unexpected weight change  Respiratory: Negative for cough and shortness of breath  Cardiovascular: Negative for chest pain  Gastrointestinal: Negative for abdominal pain, constipation, diarrhea, nausea and vomiting  Musculoskeletal: Negative for arthralgias, back pain, gait problem and myalgias  Skin: Negative for color change and rash  Neurological: Negative for dizziness and headaches  Hematological: Negative for adenopathy  Psychiatric/Behavioral: Negative for agitation and confusion  All other systems reviewed and are negative        Patient Active Problem List   Diagnosis    Menopause    Tick bite of back    History of ductal carcinoma in situ (DCIS) of breast    Personal history of malignant neoplasm of breast    Encounter for follow-up examination after completed treatment for malignant neoplasm     Past Medical History:   Diagnosis Date    Breast cancer ([facility] [address] ) 2018    right    Childhood asthma     as a child    PONV (postoperative nausea and vomiting)     Patient requests to be premedicated for N/V that can last 2 weeks post surgeries    Wears glasses      Past Surgical History:   Procedure Laterality Date    AUGMENTATION BREAST Left 12/2/2019    Procedure: BREAST AUGMENTATION;  Surgeon: Gardenia Mack MD;  [facility];  Service: Plastics    BREAST BIOPSY  2019    BREAST CYST ASPIRATION Left     2019    BREAST IMPLANT Right 4/16/2019    Procedure: BREAST TISSUE EXPANDER/IMPLANT EXCHANGE; BREAST CAPSULECTOMY;  Surgeon: Gardenia Mack MD;  [facility];  Service: Plastics    MAMMO STEREOTACTIC BREAST BIOPSY RIGHT (ALL INC) Right 7/17/2018  MASTECTOMY Right 2018    MASTECTOMY W/ SENTINEL NODE BIOPSY Right 9/12/2018    Procedure: BREAST MASTECTOMY; LYMPHATIC MAPPING WITH BLUE DYE AND RADIOACTIVE DYE; SENTINEL LYMPH NODE BIOPSY (INJECT AT 0730 BY DR BALL IN THE OR); Surgeon: Abran Altamirano MD;  [facility];  Service: Surgical Oncology    SD IMPLNT BIO IMPLNT FOR SOFT TISSUE REINFORCEMENT Right 9/12/2018    Procedure: Unique Sepulveda;  Surgeon: Ivan Oliver MD;  [facility];  Service: Plastics    SD TISSUE EXPANDER PLACEMENT BREAST RECONSTRUCTION Right 9/12/2018    Procedure: BREAST IMMEDIATE RECONSTRUCTION, TISSUE EXPANDER;  Surgeon: Ivan Oliver MD;  [facility];  Service: Plastics    US GUIDANCE BREAST BIOPSY LEFT EACH ADDITIONAL Left 7/1/2019    US GUIDED BREAST BIOPSY LEFT COMPLETE Left 7/1/2019    WISDOM TOOTH EXTRACTION       Family History   Problem Relation Age of Onset    Hypertension Father     Prostate cancer Father     No Known Problems Sister     Melanoma Sister     No Known Problems Sister     No Known Problems Sister     No Known Problems Maternal Aunt      Social History     Socioeconomic History    Marital status:      Spouse name: Not on file    Number of children: Not on file    Years of education: Not on file    Highest education level: Not on file   Occupational History    Not on file   Tobacco Use    Smoking status: Never Smoker    Smokeless tobacco: Never Used   Vaping Use    Vaping Use: Never used   Substance and Sexual Activity    Alcohol use:  Yes     Alcohol/week: 1 0 standard drink     Types: 1 Standard drinks or equivalent per week     Comment: Or less    Drug use: No    Sexual activity: Yes     Partners: Male     Birth control/protection: Post-menopausal   Other Topics Concern    Not on file   Social History Narrative    Not on file     Social Determinants of Health     Financial Resource Strain: Not on file   Food Insecurity: Not on file Transportation Needs: Not on file   Physical Activity: Not on file   Stress: Not on file   Social Connections: Not on file   Intimate Partner Violence: Not on file   Housing Stability: Not on file       Current Outpatient Medications:     Multiple Vitamins-Minerals (MULTIVITAMIN WITH MINERALS) tablet, Take 1 tablet by mouth daily, Disp: , Rfl:     Probiotic Product (PROBIOTIC-10 PO), Take 1 capsule by mouth daily in the early morning  , Disp: , Rfl:   No Known Allergies  Vitals:    02/17/22 0828   BP: 146/90   Pulse: 77   Resp: 16   Temp: (!) 96 6 °F (35 9 °C)   SpO2: 97%       Physical Exam  Vitals reviewed  Constitutional:       General: She is not in acute distress  Appearance: Normal appearance  She is well-developed  She is not diaphoretic  HENT:      Head: Normocephalic and atraumatic  Cardiovascular:      Rate and Rhythm: Normal rate and regular rhythm  Heart sounds: Normal heart sounds  Pulmonary:      Effort: Pulmonary effort is normal       Breath sounds: Normal breath sounds  Chest:   Breasts:      Right: Skin change (surgical scars) present  No axillary adenopathy or supraclavicular adenopathy  Left: Skin change (surgical scar) present  No swelling, bleeding, inverted nipple, mass, nipple discharge, tenderness, axillary adenopathy or supraclavicular adenopathy  Comments: Right reconstructed breast with implant present  There are no dominant masses, skin changes or lesions  Abdominal:      Palpations: Abdomen is soft  There is no mass  Tenderness: There is no abdominal tenderness  Musculoskeletal:         General: Normal range of motion  Cervical back: Normal range of motion  Lymphadenopathy:      Upper Body:      Right upper body: No supraclavicular or axillary adenopathy  Left upper body: No supraclavicular or axillary adenopathy  Skin:     General: Skin is warm and dry  Findings: No rash     Neurological:      Mental Status: She is alert and oriented to person, place, and time  Psychiatric:         Speech: Speech normal          Advance Care Planning/Advance Directives:  Discussed disease status, cancer treatment plans and/or cancer treatment goals with the patient

## 2022-07-06 ENCOUNTER — HOSPITAL ENCOUNTER (OUTPATIENT)
Dept: RADIOLOGY | Facility: HOSPITAL | Age: 72
Discharge: HOME | End: 2022-07-06
Payer: MEDICARE

## 2022-07-06 VITALS — HEIGHT: 65 IN | BODY MASS INDEX: 18.99 KG/M2 | WEIGHT: 114 LBS

## 2022-07-06 DIAGNOSIS — Z86.000 HISTORY OF DUCTAL CARCINOMA IN SITU (DCIS) OF BREAST: ICD-10-CM

## 2022-07-06 PROCEDURE — G0279 TOMOSYNTHESIS, MAMMO: HCPCS

## 2022-07-06 PROCEDURE — 77065 DX MAMMO INCL CAD UNI: CPT

## 2022-08-04 ENCOUNTER — OFFICE VISIT (OUTPATIENT)
Dept: FAMILY MEDICINE CLINIC | Facility: CLINIC | Age: 72
End: 2022-08-04
Payer: MEDICARE

## 2022-08-04 VITALS
HEIGHT: 65 IN | SYSTOLIC BLOOD PRESSURE: 124 MMHG | RESPIRATION RATE: 16 BRPM | WEIGHT: 114 LBS | OXYGEN SATURATION: 95 % | TEMPERATURE: 98 F | BODY MASS INDEX: 18.99 KG/M2 | HEART RATE: 66 BPM | DIASTOLIC BLOOD PRESSURE: 80 MMHG

## 2022-08-04 DIAGNOSIS — Z23 ENCOUNTER FOR IMMUNIZATION: ICD-10-CM

## 2022-08-04 DIAGNOSIS — Z11.59 NEED FOR HEPATITIS C SCREENING TEST: ICD-10-CM

## 2022-08-04 DIAGNOSIS — Z12.11 COLON CANCER SCREENING: ICD-10-CM

## 2022-08-04 DIAGNOSIS — Z00.00 MEDICARE ANNUAL WELLNESS VISIT, SUBSEQUENT: ICD-10-CM

## 2022-08-04 DIAGNOSIS — Z23 IMMUNIZATION DUE: ICD-10-CM

## 2022-08-04 DIAGNOSIS — Z85.3 HISTORY OF BREAST CANCER: Primary | ICD-10-CM

## 2022-08-04 DIAGNOSIS — L82.1 SEBORRHEIC KERATOSIS: ICD-10-CM

## 2022-08-04 DIAGNOSIS — Z78.0 POSTMENOPAUSAL: ICD-10-CM

## 2022-08-04 PROCEDURE — G0438 PPPS, INITIAL VISIT: HCPCS | Performed by: FAMILY MEDICINE

## 2022-08-04 PROCEDURE — G0009 ADMIN PNEUMOCOCCAL VACCINE: HCPCS | Performed by: FAMILY MEDICINE

## 2022-08-04 PROCEDURE — 90677 PCV20 VACCINE IM: CPT | Performed by: FAMILY MEDICINE

## 2022-08-04 PROCEDURE — 99203 OFFICE O/P NEW LOW 30 MIN: CPT | Performed by: FAMILY MEDICINE

## 2022-08-04 NOTE — PATIENT INSTRUCTIONS
Medicare Preventive Visit Patient Instructions  Thank you for completing your Welcome to Medicare Visit or Medicare Annual Wellness Visit today  Your next wellness visit will be due in one year (8/5/2023)  The screening/preventive services that you may require over the next 5-10 years are detailed below  Some tests may not apply to you based off risk factors and/or age  Screening tests ordered at today's visit but not completed yet may show as past due  Also, please note that scanned in results may not display below  Preventive Screenings:  Service Recommendations Previous Testing/Comments   Colorectal Cancer Screening  * Colonoscopy    * Fecal Occult Blood Test (FOBT)/Fecal Immunochemical Test (FIT)  * Fecal DNA/Cologuard Test  * Flexible Sigmoidoscopy Age: 54-65 years old   Colonoscopy: every 10 years (may be performed more frequently if at higher risk)  OR  FOBT/FIT: every 1 year  OR  Cologuard: every 3 years  OR  Sigmoidoscopy: every 5 years  Screening may be recommended earlier than age 48 if at higher risk for colorectal cancer  Also, an individualized decision between you and your healthcare provider will decide whether screening between the ages of 74-80 would be appropriate  Colonoscopy: Not on file  FOBT/FIT: Not on file  Cologuard: Not on file  Sigmoidoscopy: Not on file    Patient Declines     Breast Cancer Screening Age: 38 years old  Frequency: every 1-2 years  Not required if history of left and right mastectomy Mammogram: 07/06/2022    History Breast Cancer   Cervical Cancer Screening Between the ages of 21-29, pap smear recommended once every 3 years  Between the ages of 33-67, can perform pap smear with HPV co-testing every 5 years     Recommendations may differ for women with a history of total hysterectomy, cervical cancer, or abnormal pap smears in past  Pap Smear: 05/29/2018    Screening Not Indicated   Hepatitis C Screening Once for adults born between 1945 and 1965  More frequently in patients at high risk for Hepatitis C Hep C Antibody: Not on file    Screening Not Indicated   Diabetes Screening 1-2 times per year if you're at risk for diabetes or have pre-diabetes Fasting glucose: 98 mg/dL   A1C: No results in last 5 years    Due for Blood Glucose   Cholesterol Screening Once every 5 years if you don't have a lipid disorder  May order more often based on risk factors  Lipid panel: Not on file    Due for Lipid Panel     Other Preventive Screenings Covered by Medicare:  1  Abdominal Aortic Aneurysm (AAA) Screening: covered once if your at risk  You're considered to be at risk if you have a family history of AAA  2  Lung Cancer Screening: covers low dose CT scan once per year if you meet all of the following conditions: (1) Age 50-69; (2) No signs or symptoms of lung cancer; (3) Current smoker or have quit smoking within the last 15 years; (4) You have a tobacco smoking history of at least 30 pack years (packs per day multiplied by number of years you smoked); (5) You get a written order from a healthcare provider  3  Glaucoma Screening: covered annually if you're considered high risk: (1) You have diabetes OR (2) Family history of glaucoma OR (3)  aged 48 and older OR (3)  American aged 72 and older  3  Osteoporosis Screening: covered every 2 years if you meet one of the following conditions: (1) You're estrogen deficient and at risk for osteoporosis based off medical history and other findings; (2) Have a vertebral abnormality; (3) On glucocorticoid therapy for more than 3 months; (4) Have primary hyperparathyroidism; (5) On osteoporosis medications and need to assess response to drug therapy  · Last bone density test (DXA Scan): 06/16/2017  5  HIV Screening: covered annually if you're between the age of 12-76  Also covered annually if you are younger than 13 and older than 72 with risk factors for HIV infection   For pregnant patients, it is covered up to 3 times per pregnancy  Immunizations:  Immunization Recommendations   Influenza Vaccine Annual influenza vaccination during flu season is recommended for all persons aged >= 6 months who do not have contraindications   Pneumococcal Vaccine (Prevnar and Pneumovax)  * Prevnar = PCV13  * Pneumovax = PPSV23   Adults 25-60 years old: 1-3 doses may be recommended based on certain risk factors  Adults 72 years old: Prevnar (PCV13) vaccine recommended followed by Pneumovax (PPSV23) vaccine  If already received PPSV23 since turning 65, then PCV13 recommended at least one year after PPSV23 dose  Hepatitis B Vaccine 3 dose series if at intermediate or high risk (ex: diabetes, end stage renal disease, liver disease)   Tetanus (Td) Vaccine - COST NOT COVERED BY MEDICARE PART B Following completion of primary series, a booster dose should be given every 10 years to maintain immunity against tetanus  Td may also be given as tetanus wound prophylaxis  Tdap Vaccine - COST NOT COVERED BY MEDICARE PART B Recommended at least once for all adults  For pregnant patients, recommended with each pregnancy  Shingles Vaccine (Shingrix) - COST NOT COVERED BY MEDICARE PART B  2 shot series recommended in those aged 48 and above     Health Maintenance Due:      Topic Date Due    Hepatitis C Screening  Never done    Colorectal Cancer Screening  Never done    Breast Cancer Screening: Mammogram  07/06/2023     Immunizations Due:      Topic Date Due    Pneumococcal Vaccine: 65+ Years (1 - PCV) Never done    COVID-19 Vaccine (4 - Booster for Moderna series) 03/03/2022    Influenza Vaccine (1) 09/01/2022     Advance Directives   What are advance directives? Advance directives are legal documents that state your wishes and plans for medical care  These plans are made ahead of time in case you lose your ability to make decisions for yourself   Advance directives can apply to any medical decision, such as the treatments you want, and if you want to donate organs  What are the types of advance directives? There are many types of advance directives, and each state has rules about how to use them  You may choose a combination of any of the following:  · Living will: This is a written record of the treatment you want  You can also choose which treatments you do not want, which to limit, and which to stop at a certain time  This includes surgery, medicine, IV fluid, and tube feedings  · Durable power of  for healthcare [facility]): This is a written record that states who you want to make healthcare choices for you when you are unable to make them for yourself  This person, called a proxy, is usually a family member or a friend  You may choose more than 1 proxy  · Do not resuscitate (DNR) order:  A DNR order is used in case your heart stops beating or you stop breathing  It is a request not to have certain forms of treatment, such as CPR  A DNR order may be included in other types of advance directives  · Medical directive: This covers the care that you want if you are in a coma, near death, or unable to make decisions for yourself  You can list the treatments you want for each condition  Treatment may include pain medicine, surgery, blood transfusions, dialysis, IV or tube feedings, and a ventilator (breathing machine)  · Values history: This document has questions about your views, beliefs, and how you feel and think about life  This information can help others choose the care that you would choose  Why are advance directives important? An advance directive helps you control your care  Although spoken wishes may be used, it is better to have your wishes written down  Spoken wishes can be misunderstood, or not followed  Treatments may be given even if you do not want them  An advance directive may make it easier for your family to make difficult choices about your care        © Copyright [employer] 2018 Information is for End User's use only and may not be sold, redistributed or otherwise used for commercial purposes   All illustrations and images included in CareNotes® are the copyrighted property of A D A M , Inc  or [facility] Mayra Barbosa

## 2022-08-04 NOTE — PROGRESS NOTES
Assessment and Plan:     Problem List Items Addressed This Visit        Musculoskeletal and Integument    Seborrheic keratosis     Reassurance benign lesions            Other    History of breast cancer - Primary     Right breast 2018 followed by dr Carlos Schmidt up to date reviewed notes          Medicare annual wellness visit, subsequent     Reviewed and diagnostics ordered         Relevant Orders    Lipid panel    Basic metabolic panel    Immunization due     Will give pneumonia vaccine pt declines shingles vaccine            Other Visit Diagnoses     Need for hepatitis C screening test        Relevant Orders    Hepatitis C Antibody ([facility])    Postmenopausal        Relevant Orders    DXA bone density spine hip and pelvis    Colon cancer screening        Relevant Orders    Ambulatory referral for colonoscopy    Encounter for immunization        Relevant Orders    Pneumococcal Conjugate Vaccine 20-valent (PCV20)           Preventive health issues were discussed with patient, and age appropriate screening tests were ordered as noted in patient's After Visit Summary  Personalized health advice and appropriate referrals for health education or preventive services given if needed, as noted in patient's After Visit Summary       History of Present Illness:     Patient presents for a Medicare Wellness Visit    HPI   Patient Care Team:  Gurinder Díaz MD as PCP - General (Internal Medicine)  Lexi Lancaster MD as Surgeon (Oncology)  Smith Earl MD (Plastic Surgery)  Nicolas Ellis MD (Obstetrics and Gynecology)     Review of Systems:     Review of Systems     Problem List:     Patient Active Problem List   Diagnosis    History of ductal carcinoma in situ (DCIS) of breast    Personal history of malignant neoplasm of breast    Encounter for follow-up examination after completed treatment for malignant neoplasm    History of breast cancer    Medicare annual wellness visit, subsequent    Immunization due   Alhaji Gaston Seborrheic keratosis      Past Medical and Surgical History:     Past Medical History:   Diagnosis Date    Breast cancer ([address] ) 2018    right    Childhood asthma     as a child    PONV (postoperative nausea and vomiting)     Patient requests to be premedicated for N/V that can last 2 weeks post surgeries    Wears glasses      Past Surgical History:   Procedure Laterality Date    AUGMENTATION BREAST Left 12/02/2019    Procedure: BREAST AUGMENTATION;  Surgeon: Gardenia Mack MD;  [facility];  Service: Plastics    AUGMENTATION MAMMAPLASTY Left 2019    BREAST BIOPSY  2019    BREAST CYST ASPIRATION Left     2019    BREAST IMPLANT Right 04/16/2019    Procedure: BREAST TISSUE EXPANDER/IMPLANT EXCHANGE; BREAST CAPSULECTOMY;  Surgeon: Gardenia Mack MD;  [facility];  Service: Plastics    MAMMO STEREOTACTIC BREAST BIOPSY RIGHT (ALL INC) Right 07/17/2018    MASTECTOMY Right 2018    MASTECTOMY W/ SENTINEL NODE BIOPSY Right 09/12/2018    Procedure: BREAST MASTECTOMY; LYMPHATIC MAPPING WITH BLUE DYE AND RADIOACTIVE DYE; SENTINEL LYMPH NODE BIOPSY (INJECT AT 0730 BY DR BALL IN THE OR);   Surgeon: Luis Armando Price MD;  [facility];  Service: Surgical Oncology    VA IMPLNT BIO IMPLNT FOR SOFT TISSUE REINFORCEMENT Right 09/12/2018    Procedure: ACELLULAR DERMAL MATRIX;  Surgeon: Gardenia Mack MD;  [facility];  Service: Plastics    VA TISSUE EXPANDER PLACEMENT BREAST RECONSTRUCTION Right 09/12/2018    Procedure: BREAST IMMEDIATE RECONSTRUCTION, TISSUE EXPANDER;  Surgeon: Gardenia Mack MD;  [facility];  Service: Plastics    US GUIDANCE BREAST BIOPSY LEFT EACH ADDITIONAL Left 07/01/2019    US GUIDED BREAST BIOPSY LEFT COMPLETE Left 07/01/2019    WISDOM TOOTH EXTRACTION        Family History:     Family History   Problem Relation Age of Onset    Hypertension Father     Prostate cancer Father     No Known Problems Sister     Melanoma Sister     No Known Problems Sister     No Known Problems Sister     No Known Problems Maternal Aunt       Social History:     Social History     Socioeconomic History    Marital status:      Spouse name: None    Number of children: None    Years of education: None    Highest education level: None   Occupational History    None   Tobacco Use    Smoking status: Never Smoker    Smokeless tobacco: Never Used   Vaping Use    Vaping Use: Never used   Substance and Sexual Activity    Alcohol use: Yes     Alcohol/week: 1 0 standard drink     Types: 1 Standard drinks or equivalent per week     Comment: Or less    Drug use: No    Sexual activity: Yes     Partners: Male     Birth control/protection: Post-menopausal   Other Topics Concern    None   Social History Narrative    None     Social Determinants of Health     Financial Resource Strain: Not on file   Food Insecurity: Not on file   Transportation Needs: Not on file   Physical Activity: Not on file   Stress: Not on file   Social Connections: Not on file   Intimate Partner Violence: Not on file   Housing Stability: Not on file      Medications and Allergies:     Current Outpatient Medications   Medication Sig Dispense Refill    Probiotic Product (PROBIOTIC-10 PO) Take 1 capsule by mouth daily in the early morning         No current facility-administered medications for this visit       No Known Allergies   Immunizations:     Immunization History   Administered Date(s) Administered    COVID-19 MODERNA VACC 0 25 ML IM BOOSTER 11/03/2021    COVID-19 MODERNA VACC 0 5 ML IM 01/29/2021, 02/26/2021      Health Maintenance:         Topic Date Due    Hepatitis C Screening  Never done    Colorectal Cancer Screening  Never done    Breast Cancer Screening: Mammogram  07/06/2023         Topic Date Due    Pneumococcal Vaccine: 65+ Years (1 - PCV) Never done    COVID-19 Vaccine (4 - Booster for Moderna series) 03/03/2022    Influenza Vaccine (1) 09/01/2022      Medicare Screening Tests and Risk Assessments:     Maxine Wahl is here for her Subsequent Wellness visit  Health Risk Assessment:   Patient rates overall health as excellent  Patient feels that their physical health rating is same  Patient is very satisfied with their life  Eyesight was rated as slightly worse  Hearing was rated as slightly worse  Patient feels that their emotional and mental health rating is same  Patients states they are never, rarely angry  Patient states they are sometimes unusually tired/fatigued  Pain experienced in the last 7 days has been some  Patient's pain rating has been 1/10  Patient states that she has experienced no weight loss or gain in last 6 months  knee    Depression Screening:   PHQ-2 Score: 0      Fall Risk Screening: In the past year, patient has experienced: no history of falling in past year      Urinary Incontinence Screening:   Patient has not leaked urine accidently in the last six months  Home Safety:  Patient does not have trouble with stairs inside or outside of their home  Patient has working smoke alarms and has working carbon monoxide detector  Home safety hazards include: none  Nutrition:   Current diet is Regular  Medications:   Patient is not currently taking any over-the-counter supplements  Patient is able to manage medications  Activities of Daily Living (ADLs)/Instrumental Activities of Daily Living (IADLs):   Walk and transfer into and out of bed and chair?: Yes  Dress and groom yourself?: Yes    Bathe or shower yourself?: Yes    Feed yourself? Yes  Do your laundry/housekeeping?: Yes  Manage your money, pay your bills and track your expenses?: Yes  Make your own meals?: Yes    Do your own shopping?: Yes    Previous Hospitalizations:   Any hospitalizations or ED visits within the last 12 months?: No      Advance Care Planning:   Living will: Yes    Durable POA for healthcare: Yes    Advanced directive: Yes    Advanced directive counseling given:  Yes Five wishes given: No    Patient declined ACP directive: Yes    End of Life Decisions reviewed with patient: Yes    Provider agrees with end of life decisions: Yes      Cognitive Screening:   Provider or family/friend/caregiver concerned regarding cognition?: No    PREVENTIVE SCREENINGS      Cardiovascular Screening:      Due for: Lipid Panel      Diabetes Screening:       Due for: Blood Glucose      Colorectal Cancer Screening:     General: Patient Declines      Breast Cancer Screening:     General: History Breast Cancer      Cervical Cancer Screening:    General: Screening Not Indicated      Osteoporosis Screening:      Due for: Bone Density Ultrasound      Abdominal Aortic Aneurysm (AAA) Screening:        General: Screening Not Indicated      Lung Cancer Screening:     General: Screening Not Indicated      Hepatitis C Screening:    General: Screening Not Indicated    Screening, Brief Intervention, and Referral to Treatment (SBIRT)    Screening  Typical number of drinks in a day: 0  Typical number of drinks in a week: 1  Interpretation: Low risk drinking behavior      Single Item Drug Screening:  How often have you used an illegal drug (including marijuana) or a prescription medication for non-medical reasons in the past year? never    Single Item Drug Screen Score: 0  Interpretation: Negative screen for possible drug use disorder    No exam data present     Physical Exam:     /80 (BP Location: Left arm, Patient Position: Sitting, Cuff Size: Standard)   Pulse 66   Temp 98 °F (36 7 °C) (Temporal)   Resp 16   Ht 5' 5" (1 651 m)   Wt 51 7 kg (114 lb)   LMP 01/01/2000 (Within Months)   SpO2 95%   BMI 18 97 kg/m²     Physical Exam     Emma Benjamin MD

## 2022-08-04 NOTE — PROGRESS NOTES
Assessment/Plan:         Problem List Items Addressed This Visit        Musculoskeletal and Integument    Seborrheic keratosis     Reassurance benign lesions            Other    History of breast cancer - Primary     Right breast 2018 followed by dr Edwin Tijerina up to date reviewed notes          Medicare annual wellness visit, subsequent     Reviewed and diagnostics ordered         Relevant Orders    Lipid panel    Basic metabolic panel    Immunization due     Will give pneumonia vaccine pt declines shingles vaccine            Other Visit Diagnoses     Need for hepatitis C screening test        Relevant Orders    Hepatitis C Antibody ([facility])    Postmenopausal        Relevant Orders    DXA bone density spine hip and pelvis    Colon cancer screening        Relevant Orders    Ambulatory referral for colonoscopy    Encounter for immunization        Relevant Orders    Pneumococcal Conjugate Vaccine 20-valent (PCV20)            Subjective:  New pt here for initial visit  Pt needs  Health maintenance and Medicare wellness hx of breast cancer surgical  Oncology and plastic follow ups  Pt exercises regularly     Patient ID: Cezar Marie is a 81 y o  female  HPI    The following portions of the patient's history were reviewed and updated as appropriate:   Past Medical History:  She has a past medical history of Breast cancer ([address] ) (2018), Childhood asthma, PONV (postoperative nausea and vomiting), and Wears glasses  ,  _______________________________________________________________________  Medical Problems:  does not have any pertinent problems on file ,  _______________________________________________________________________  Past Surgical History:   has a past surgical history that includes [place] tooth extraction; pr tissue expander placement breast reconstruction (Right, 09/12/2018); pr implnt bio implnt for soft tissue reinforcement (Right, 09/12/2018);  Mastectomy w/ sentinel node biopsy (Right, 09/12/2018); Mammo stereotactic breast biopsy right (all inc) (Right, 07/17/2018); BREAST IMPLANT (Right, 04/16/2019); Mastectomy (Right, 2018); US guided breast biopsy left complete (Left, 07/01/2019); US guidance breast biopsy left each additional (Left, 07/01/2019); AUGMENTATION BREAST (Left, 12/02/2019); Breast cyst aspiration (Left); Breast biopsy (2019); and Augmentation mammaplasty (Left, 2019)  ,  _______________________________________________________________________  Family History:  family history includes Hypertension in her father; Melanoma in her sister; No Known Problems in her maternal aunt, sister, sister, and sister; Prostate cancer in her father ,  _______________________________________________________________________  Social History:   reports that she has never smoked  She has never used smokeless tobacco  She reports current alcohol use of about 1 0 standard drink of alcohol per week  She reports that she does not use drugs  ,  _______________________________________________________________________  Allergies:  has No Known Allergies     _______________________________________________________________________  Current Outpatient Medications   Medication Sig Dispense Refill    Probiotic Product (PROBIOTIC-10 PO) Take 1 capsule by mouth daily in the early morning         No current facility-administered medications for this visit      _______________________________________________________________________  Review of Systems   Constitutional: Negative for appetite change, chills, fatigue and fever  Respiratory: Negative for cough, chest tightness and shortness of breath  Cardiovascular: Negative for chest pain, palpitations and leg swelling  Gastrointestinal: Negative for abdominal pain, constipation, diarrhea, nausea and vomiting  Genitourinary: Negative for difficulty urinating and frequency  Musculoskeletal: Negative for arthralgias, back pain and neck pain  Skin: Negative for rash  Neurological: Negative for dizziness, weakness, light-headedness, numbness and headaches  Hematological: Does not bruise/bleed easily  Psychiatric/Behavioral: Negative for dysphoric mood and sleep disturbance  The patient is not nervous/anxious  Objective:  Vitals:    08/04/22 1439   BP: 124/80   BP Location: Left arm   Patient Position: Sitting   Cuff Size: Standard   Pulse: 66   Resp: 16   Temp: 98 °F (36 7 °C)   TempSrc: Temporal   SpO2: 95%   Weight: 51 7 kg (114 lb)   Height: 5' 5" (1 651 m)     Body mass index is 18 97 kg/m²  Physical Exam  Constitutional:       General: She is not in acute distress  Appearance: Normal appearance  She is well-developed  She is obese  She is not ill-appearing  Eyes:      Extraocular Movements: Extraocular movements intact  Pupils: Pupils are equal, round, and reactive to light  Cardiovascular:      Rate and Rhythm: Normal rate and regular rhythm  Pulses: Normal pulses  Heart sounds: Normal heart sounds  No murmur heard  Pulmonary:      Effort: Pulmonary effort is normal       Breath sounds: Normal breath sounds  Musculoskeletal:      Right lower leg: No edema  Left lower leg: No edema  Skin:     Findings: Lesion (several seborreic keratoses and senikle hemangiomas) present  Comments: No unusual moles seen  on body check   Neurological:      General: No focal deficit present  Mental Status: She is alert and oriented to person, place, and time  Mental status is at baseline  Psychiatric:         Mood and Affect: Mood normal          Behavior: Behavior normal          Thought Content:  Thought content normal

## 2022-08-06 ENCOUNTER — APPOINTMENT (OUTPATIENT)
Dept: LAB | Facility: CLINIC | Age: 72
End: 2022-08-06
Payer: MEDICARE

## 2022-08-06 DIAGNOSIS — Z11.59 NEED FOR HEPATITIS C SCREENING TEST: ICD-10-CM

## 2022-08-06 DIAGNOSIS — Z00.00 MEDICARE ANNUAL WELLNESS VISIT, SUBSEQUENT: ICD-10-CM

## 2022-08-06 LAB
ANION GAP SERPL CALCULATED.3IONS-SCNC: 5 MMOL/L (ref 4–13)
BUN SERPL-MCNC: 18 MG/DL (ref 5–25)
CALCIUM SERPL-MCNC: 9.5 MG/DL (ref 8.4–10.2)
CHLORIDE SERPL-SCNC: 104 MMOL/L (ref 96–108)
CHOLEST SERPL-MCNC: 195 MG/DL
CO2 SERPL-SCNC: 30 MMOL/L (ref 21–32)
CREAT SERPL-MCNC: 0.59 MG/DL (ref 0.6–1.3)
GFR SERPL CREATININE-BSD FRML MDRD: 93 ML/MIN/1.73SQ M
GLUCOSE P FAST SERPL-MCNC: 89 MG/DL (ref 65–99)
HCV AB SER QL: NORMAL
HDLC SERPL-MCNC: 93 MG/DL
LDLC SERPL CALC-MCNC: 93 MG/DL (ref 0–100)
NONHDLC SERPL-MCNC: 102 MG/DL
POTASSIUM SERPL-SCNC: 4.9 MMOL/L (ref 3.5–5.3)
SODIUM SERPL-SCNC: 139 MMOL/L (ref 135–147)
TRIGL SERPL-MCNC: 46 MG/DL

## 2022-08-06 PROCEDURE — 80061 LIPID PANEL: CPT

## 2022-08-06 PROCEDURE — 86803 HEPATITIS C AB TEST: CPT

## 2022-08-06 PROCEDURE — 36415 COLL VENOUS BLD VENIPUNCTURE: CPT

## 2022-08-06 PROCEDURE — 80048 BASIC METABOLIC PNL TOTAL CA: CPT

## 2022-08-17 ENCOUNTER — HOSPITAL ENCOUNTER (OUTPATIENT)
Dept: RADIOLOGY | Age: 72
Discharge: HOME | End: 2022-08-17
Payer: MEDICARE

## 2022-08-17 DIAGNOSIS — Z78.0 POSTMENOPAUSAL: ICD-10-CM

## 2022-08-17 PROCEDURE — 77080 DXA BONE DENSITY AXIAL: CPT

## 2022-08-18 ENCOUNTER — OFFICE VISIT (OUTPATIENT)
Dept: SURGICAL ONCOLOGY | Facility: CLINIC | Age: 72
End: 2022-08-18
Payer: MEDICARE

## 2022-08-18 VITALS
OXYGEN SATURATION: 94 % | HEIGHT: 65 IN | WEIGHT: 116 LBS | HEART RATE: 59 BPM | DIASTOLIC BLOOD PRESSURE: 80 MMHG | TEMPERATURE: 96 F | RESPIRATION RATE: 16 BRPM | SYSTOLIC BLOOD PRESSURE: 130 MMHG | BODY MASS INDEX: 19.33 KG/M2

## 2022-08-18 DIAGNOSIS — Z85.3 PERSONAL HISTORY OF MALIGNANT NEOPLASM OF BREAST: ICD-10-CM

## 2022-08-18 DIAGNOSIS — Z08 ENCOUNTER FOR FOLLOW-UP EXAMINATION AFTER COMPLETED TREATMENT FOR MALIGNANT NEOPLASM: Primary | ICD-10-CM

## 2022-08-18 DIAGNOSIS — Z86.000 HISTORY OF DUCTAL CARCINOMA IN SITU (DCIS) OF BREAST: ICD-10-CM

## 2022-08-18 PROCEDURE — 99213 OFFICE O/P EST LOW 20 MIN: CPT

## 2022-08-18 NOTE — PROGRESS NOTES
Surgical Oncology Follow Up       [facility]  [facility] [place]  [address]  [place] [address]    [address]  1950  [id number]  [address]  [facility] [place]  [address] [address]    Chief Complaint   Patient presents with    Follow-up       Assessment/Plan:  1  Encounter for follow-up examination after completed treatment for malignant neoplasm  - 6 month follow up    2  History of ductal carcinoma in situ (DCIS) of breast    3  Personal history of malignant neoplasm of breast       Discussion/Summary:  Patient is a 79-year-old female presenting today for six-month follow-up for right breast cancer diagnosed in 2018  Pathology revealed DCIS ER/GA negative  She had a right mastectomy with sentinel node biopsy with Dr Micaela White and had immediate reconstruction with Dr Alden Streeter  Radiation was not recommended  She had a diagnostic mammogram of the left breast on 07/06/2022 which was BI-RADS 2 category 4 density  There were no concerns on a clinical breast exam   Patient and I spoke about automated breast ultrasound for dense breast tissue in conjunction with annual mammography and she was agreeable  I will order this at her next visit  I will see the patient back in 6 months or sooner should the need arise  She was instructed to call with any questions or concerns prior to this time  All questions were answered today      History of Present Illness:     Oncology History   History of ductal carcinoma in situ (DCIS) of breast   7/17/2018 Biopsy    Right breast biopsy  Upper inner breast  DCIS  Grade 3  ER 0  GA 0  Stage 0     9/12/2018 Surgery    Right mastectomy with sentinel lymph node biopsy  Ductal carcinoma in situ  1 6 cm  Grade 3  ER 0  GA 0  0/1 lymph node  Stage 0    Immediate reconstruction with Dr Alden Streeter (tissue expander)     10/1/2018 - 10/1/2018 Radiation    Radiation therapy not recommended          -Interval History: Patient is a 26-year-old female presenting today for six-month follow-up for right breast cancer diagnosed in 2018  She had a diagnostic mammogram of the left breast on 07/06/2022 which was BI-RADS 2 category 4 density  Patient denies changes on her breast exam  She denies persistent headaches, bone pain, back pain, shortness of breath, or abdominal pain  Review of Systems:  Review of Systems   Constitutional: Negative for activity change, appetite change, fatigue and unexpected weight change  Respiratory: Negative for cough and shortness of breath  Cardiovascular: Negative for chest pain  Gastrointestinal: Negative for abdominal pain, diarrhea, nausea and vomiting  Endocrine: Negative for heat intolerance  Musculoskeletal: Negative for arthralgias, back pain and myalgias  Skin: Negative for rash  Neurological: Negative for weakness and headaches  Hematological: Negative for adenopathy         Patient Active Problem List   Diagnosis    History of ductal carcinoma in situ (DCIS) of breast    Personal history of malignant neoplasm of breast    Encounter for follow-up examination after completed treatment for malignant neoplasm    History of breast cancer    Medicare annual wellness visit, subsequent    Immunization due    Seborrheic keratosis     Past Medical History:   Diagnosis Date    Breast cancer ([address] ) 2018    right    Childhood asthma     as a child    PONV (postoperative nausea and vomiting)     Patient requests to be premedicated for N/V that can last 2 weeks post surgeries    Wears glasses      Past Surgical History:   Procedure Laterality Date    AUGMENTATION BREAST Left 12/02/2019    Procedure: BREAST AUGMENTATION;  Surgeon: Rachna Eason MD;  [facility];  Service: Plastics    AUGMENTATION MAMMAPLASTY Left 2019    BREAST BIOPSY  2019    BREAST CYST ASPIRATION Left     2019    BREAST IMPLANT Right 04/16/2019    Procedure: BREAST TISSUE EXPANDER/IMPLANT EXCHANGE; BREAST CAPSULECTOMY;  Surgeon: Pradip Bush MD;  [facility];  Service: Plastics    MAMMO STEREOTACTIC BREAST BIOPSY RIGHT (ALL INC) Right 07/17/2018    MASTECTOMY Right 2018    MASTECTOMY W/ SENTINEL NODE BIOPSY Right 09/12/2018    Procedure: BREAST MASTECTOMY; LYMPHATIC MAPPING WITH BLUE DYE AND RADIOACTIVE DYE; SENTINEL LYMPH NODE BIOPSY (INJECT AT 0730 BY DR BALL IN THE OR); Surgeon: Enid Cooley MD;  [facility];  Service: Surgical Oncology    NY IMPLNT BIO IMPLNT FOR SOFT TISSUE REINFORCEMENT Right 09/12/2018    Procedure: Rhondamonica Ovalle;  Surgeon: Pradip Bush MD;  [facility];  Service: Plastics    NY TISSUE EXPANDER PLACEMENT BREAST RECONSTRUCTION Right 09/12/2018    Procedure: BREAST IMMEDIATE RECONSTRUCTION, TISSUE EXPANDER;  Surgeon: Pradip Bush MD;  [facility];  Service: Plastics    US GUIDANCE BREAST BIOPSY LEFT EACH ADDITIONAL Left 07/01/2019    US GUIDED BREAST BIOPSY LEFT COMPLETE Left 07/01/2019    WISDOM TOOTH EXTRACTION       Family History   Problem Relation Age of Onset    Hypertension Father     Prostate cancer Father     No Known Problems Sister     Melanoma Sister     No Known Problems Sister     No Known Problems Sister     No Known Problems Maternal Aunt      Social History     Socioeconomic History    Marital status:      Spouse name: Not on file    Number of children: Not on file    Years of education: Not on file    Highest education level: Not on file   Occupational History    Not on file   Tobacco Use    Smoking status: Never Smoker    Smokeless tobacco: Never Used   Vaping Use    Vaping Use: Never used   Substance and Sexual Activity    Alcohol use:  Yes     Alcohol/week: 1 0 standard drink     Types: 1 Standard drinks or equivalent per week     Comment: Or less    Drug use: No    Sexual activity: Yes     Partners: Male Birth control/protection: Post-menopausal   Other Topics Concern    Not on file   Social History Narrative    Not on file     Social Determinants of Health     Financial Resource Strain: Not on file   Food Insecurity: Not on file   Transportation Needs: Not on file   Physical Activity: Not on file   Stress: Not on file   Social Connections: Not on file   Intimate Partner Violence: Not on file   Housing Stability: Not on file       Current Outpatient Medications:     Probiotic Product (PROBIOTIC-10 PO), Take 1 capsule by mouth daily in the early morning  , Disp: , Rfl:   No Known Allergies  Vitals:    08/18/22 0807   BP: 130/80   Pulse: 59   Resp: 16   Temp: (!) 96 °F (35 6 °C)   SpO2: 94%       Physical Exam  Constitutional:       General: She is not in acute distress  Appearance: Normal appearance  Cardiovascular:      Rate and Rhythm: Normal rate and regular rhythm  Pulses: Normal pulses  Heart sounds: Normal heart sounds  Pulmonary:      Effort: Pulmonary effort is normal       Breath sounds: Normal breath sounds  Chest:      Chest wall: No mass  Breasts:      Right: No swelling, bleeding, mass, skin change, tenderness, axillary adenopathy or supraclavicular adenopathy  Left: No swelling, bleeding, inverted nipple, mass, nipple discharge, skin change, tenderness, axillary adenopathy or supraclavicular adenopathy  Comments: Right breast mastectomy scar with implant present  Implant in right breast  No masses, nodularity, skin changes, nipple changes or discharge (of left breast), or adenopathy appreciated on physical exam      Abdominal:      General: Abdomen is flat  Palpations: Abdomen is soft  Lymphadenopathy:      Upper Body:      Right upper body: No supraclavicular, axillary or pectoral adenopathy  Left upper body: No supraclavicular, axillary or pectoral adenopathy  Skin:     General: Skin is warm     Neurological:      General: No focal deficit present  Mental Status: She is alert and oriented to person, place, and time  Psychiatric:         Mood and Affect: Mood normal          Behavior: Behavior normal            Results:    Imaging  No results found  I reviewed the above imaging data  Advance Care Planning/Advance Directives:  Discussed disease status, cancer treatment plans and/or cancer treatment goals with the patient

## 2022-08-25 ENCOUNTER — TELEPHONE (OUTPATIENT)
Dept: FAMILY MEDICINE CLINIC | Facility: CLINIC | Age: 72
End: 2022-08-25

## 2022-08-25 NOTE — TELEPHONE ENCOUNTER
Spoke to patient about her dexa scan  Gave her message that is under dexa scan in imaging   Has osteopenia

## 2022-10-11 PROBLEM — Z00.00 MEDICARE ANNUAL WELLNESS VISIT, SUBSEQUENT: Status: RESOLVED | Noted: 2022-08-04 | Resolved: 2022-10-11

## 2022-10-28 ENCOUNTER — RA CDI HCC (OUTPATIENT)
Dept: OTHER | Facility: HOSPITAL | Age: 72
End: 2022-10-28

## 2022-10-28 NOTE — PROGRESS NOTES
[address]  coding opportunities       Chart reviewed, no opportunity found: CHART REVIEWED, NO OPPORTUNITY FOUND        Patients Insurance     Medicare Insurance: Medicare

## 2022-11-08 ENCOUNTER — CONSULT (OUTPATIENT)
Dept: FAMILY MEDICINE CLINIC | Facility: CLINIC | Age: 72
End: 2022-11-08

## 2022-11-08 VITALS
SYSTOLIC BLOOD PRESSURE: 130 MMHG | BODY MASS INDEX: 18.99 KG/M2 | DIASTOLIC BLOOD PRESSURE: 64 MMHG | TEMPERATURE: 96.4 F | WEIGHT: 114 LBS | HEART RATE: 72 BPM | OXYGEN SATURATION: 96 % | HEIGHT: 65 IN | RESPIRATION RATE: 16 BRPM

## 2022-11-08 DIAGNOSIS — Z01.818 PRE-OP EVALUATION: Primary | ICD-10-CM

## 2022-11-08 DIAGNOSIS — H25.9 AGE-RELATED CATARACT OF BOTH EYES, UNSPECIFIED AGE-RELATED CATARACT TYPE: ICD-10-CM

## 2022-11-08 PROBLEM — Z01.810 PRE-OPERATIVE CARDIOVASCULAR EXAMINATION, SUPRAVENTRICULAR ARRHYTHMIA: Status: ACTIVE | Noted: 2022-11-08

## 2022-11-08 PROBLEM — I49.9 PRE-OPERATIVE CARDIOVASCULAR EXAMINATION, SUPRAVENTRICULAR ARRHYTHMIA: Status: ACTIVE | Noted: 2022-11-08

## 2022-11-08 RX ORDER — KETOROLAC TROMETHAMINE 5 MG/ML
SOLUTION OPHTHALMIC
COMMUNITY
Start: 2022-11-07

## 2022-11-08 RX ORDER — POLYMYXIN B SULFATE AND TRIMETHOPRIM 1; 10000 MG/ML; [USP'U]/ML
SOLUTION OPHTHALMIC
COMMUNITY
Start: 2022-11-07

## 2022-11-08 RX ORDER — PREDNISOLONE ACETATE 10 MG/ML
SUSPENSION/ DROPS OPHTHALMIC
COMMUNITY
Start: 2022-11-07

## 2022-12-02 ENCOUNTER — ANESTHESIA EVENT (OUTPATIENT)
Dept: GASTROENTEROLOGY | Facility: AMBULARY SURGERY CENTER | Age: 72
End: 2022-12-02

## 2022-12-02 ENCOUNTER — HOSPITAL ENCOUNTER (OUTPATIENT)
Dept: GASTROENTEROLOGY | Facility: AMBULARY SURGERY CENTER | Age: 72
Setting detail: OUTPATIENT SURGERY
End: 2022-12-02
Attending: INTERNAL MEDICINE

## 2022-12-02 ENCOUNTER — ANESTHESIA (OUTPATIENT)
Dept: GASTROENTEROLOGY | Facility: AMBULARY SURGERY CENTER | Age: 72
End: 2022-12-02

## 2022-12-02 VITALS
BODY MASS INDEX: 18.49 KG/M2 | DIASTOLIC BLOOD PRESSURE: 59 MMHG | HEIGHT: 65 IN | TEMPERATURE: 97.4 F | SYSTOLIC BLOOD PRESSURE: 124 MMHG | HEART RATE: 72 BPM | RESPIRATION RATE: 18 BRPM | WEIGHT: 111 LBS | OXYGEN SATURATION: 99 %

## 2022-12-02 DIAGNOSIS — Z12.12 SCREENING FOR COLORECTAL CANCER: ICD-10-CM

## 2022-12-02 DIAGNOSIS — Z12.11 SCREENING FOR COLORECTAL CANCER: ICD-10-CM

## 2022-12-02 RX ORDER — PROPOFOL 10 MG/ML
INJECTION, EMULSION INTRAVENOUS AS NEEDED
Status: DISCONTINUED | OUTPATIENT
Start: 2022-12-02 | End: 2022-12-02

## 2022-12-02 RX ORDER — SODIUM CHLORIDE, SODIUM LACTATE, POTASSIUM CHLORIDE, CALCIUM CHLORIDE 600; 310; 30; 20 MG/100ML; MG/100ML; MG/100ML; MG/100ML
INJECTION, SOLUTION INTRAVENOUS CONTINUOUS PRN
Status: DISCONTINUED | OUTPATIENT
Start: 2022-12-02 | End: 2022-12-02

## 2022-12-02 RX ADMIN — PROPOFOL 50 MG: 10 INJECTION, EMULSION INTRAVENOUS at 10:51

## 2022-12-02 RX ADMIN — PROPOFOL 70 MG: 10 INJECTION, EMULSION INTRAVENOUS at 10:49

## 2022-12-02 RX ADMIN — PROPOFOL 50 MG: 10 INJECTION, EMULSION INTRAVENOUS at 10:56

## 2022-12-02 RX ADMIN — PROPOFOL 30 MG: 10 INJECTION, EMULSION INTRAVENOUS at 10:50

## 2022-12-02 RX ADMIN — PROPOFOL 50 MG: 10 INJECTION, EMULSION INTRAVENOUS at 10:53

## 2022-12-02 RX ADMIN — PROPOFOL 50 MG: 10 INJECTION, EMULSION INTRAVENOUS at 10:58

## 2022-12-02 RX ADMIN — PROPOFOL 50 MG: 10 INJECTION, EMULSION INTRAVENOUS at 11:01

## 2022-12-02 RX ADMIN — SODIUM CHLORIDE, SODIUM LACTATE, POTASSIUM CHLORIDE, AND CALCIUM CHLORIDE: .6; .31; .03; .02 INJECTION, SOLUTION INTRAVENOUS at 09:44

## 2022-12-02 NOTE — H&P
History and Physical - [facility]  Cherry Daly 81 y o  female MRN: [id number]    HPI: Sally Johnston is a 81y o  year old female who presents for colon cancer screening  Review of Systems    Historical Information   Past Medical History:   Diagnosis Date   • Asthma    • Breast cancer ([address] ) 2018    right   • Cancer ([address] )    • Childhood asthma     as a child   • PONV (postoperative nausea and vomiting)     Patient requests to be premedicated for N/V that can last 2 weeks post surgeries   • Wears glasses      Past Surgical History:   Procedure Laterality Date   • AUGMENTATION BREAST Left 12/02/2019    Procedure: BREAST AUGMENTATION;  Surgeon: Altaf Summers MD;  [facility];  Service: Plastics   • AUGMENTATION MAMMAPLASTY Left 2019   • BREAST BIOPSY  2019   • BREAST CYST ASPIRATION Left     2019   • BREAST IMPLANT Right 04/16/2019    Procedure: BREAST TISSUE EXPANDER/IMPLANT EXCHANGE; BREAST CAPSULECTOMY;  Surgeon: Altaf Summers MD;  [facility];  Service: Plastics   • MAMMO STEREOTACTIC BREAST BIOPSY RIGHT (ALL INC) Right 07/17/2018   • MASTECTOMY Right 2018   • MASTECTOMY W/ SENTINEL NODE BIOPSY Right 09/12/2018    Procedure: BREAST MASTECTOMY; LYMPHATIC MAPPING WITH BLUE DYE AND RADIOACTIVE DYE; SENTINEL LYMPH NODE BIOPSY (INJECT AT 0730 BY DR BALL IN THE OR);   Surgeon: Angel Alexander MD;  [facility];  Service: Surgical Oncology   • MO IMPLNT BIO IMPLNT FOR SOFT TISSUE REINFORCEMENT Right 09/12/2018    Procedure: ACELLULAR DERMAL MATRIX;  Surgeon: Altaf Summers MD;  [facility];  Service: Plastics   • MO TISSUE EXPANDER PLACEMENT BREAST RECONSTRUCTION Right 09/12/2018    Procedure: BREAST IMMEDIATE RECONSTRUCTION, TISSUE EXPANDER;  Surgeon: Altaf Summers MD;  [facility];  Service: Plastics   • US GUIDANCE BREAST BIOPSY LEFT EACH ADDITIONAL Left 07/01/2019   • US GUIDED BREAST BIOPSY LEFT COMPLETE Left 07/01/2019   • WISDOM TOOTH EXTRACTION       Social History   Social History     Substance and Sexual Activity   Alcohol Use Yes   • Alcohol/week: 1 0 standard drink   • Types: 1 Standard drinks or equivalent per week    Comment: Or less     Social History     Substance and Sexual Activity   Drug Use No     Social History     Tobacco Use   Smoking Status Never   Smokeless Tobacco Never     Family History   Problem Relation Age of Onset   • Hypertension Father    • Prostate cancer Father    • No Known Problems Sister    • Melanoma Sister    • No Known Problems Sister    • No Known Problems Sister    • No Known Problems Maternal Aunt        Meds/Allergies     (Not in a hospital admission)      No Known Allergies    Objective     /79   Pulse 80   Temp (!) 96 9 °F (36 1 °C) (Temporal)   Resp 18   Ht 5' 5" (1 651 m)   Wt 50 3 kg (111 lb)   LMP 01/01/2000 (Within Months)   SpO2 99%   BMI 18 47 kg/m²       PHYSICAL EXAM    Gen: NAD  CV: RRR  CHEST: Clear  ABD: soft, NT/ND  EXT: no edema  Neuro: AAO      ASSESSMENT/PLAN:  This is a 81y o  year old female here for colon cancer screening  Patient was explained about  the risks and benefits of the procedure  Risks including but not limited to bleeding, infection, perforation were explained in detail  PLAN:   Procedure:  Colonoscopy

## 2022-12-02 NOTE — ANESTHESIA PREPROCEDURE EVALUATION
Procedure:  COLONOSCOPY    Relevant Problems   NEURO/PSYCH   (+) Encounter for follow-up examination after completed treatment for malignant neoplasm   (+) History of breast cancer   (+) History of ductal carcinoma in situ (DCIS) of breast   (+) Personal history of malignant neoplasm of breast        Physical Exam    Airway    Mallampati score: I  TM Distance: >3 FB  Neck ROM: full     Dental       Cardiovascular  Cardiovascular exam normal    Pulmonary  Pulmonary exam normal     Other Findings        Anesthesia Plan  ASA Score- 2     Anesthesia Type- IV sedation with anesthesia with ASA Monitors  Additional Monitors:   Airway Plan:           Plan Factors-Exercise tolerance (METS): >4 METS  Chart reviewed  EKG reviewed  Imaging results reviewed  Existing labs reviewed  Patient summary reviewed  Induction- intravenous  Postoperative Plan- Plan for postoperative opioid use  Planned trial extubation    Informed Consent- Anesthetic plan and risks discussed with patient  I personally reviewed this patient with the CRNA  Discussed and agreed on the Anesthesia Plan with the CRNA  Dawn Pope

## 2022-12-02 NOTE — ANESTHESIA POSTPROCEDURE EVALUATION
Post-Op Assessment Note    CV Status:  Stable  Pain Score: 0    Pain management: adequate     Mental Status:  Alert and awake   Hydration Status:  Euvolemic   PONV Controlled:  Controlled   Airway Patency:  Patent      Post Op Vitals Reviewed: Yes      Staff: CRNA         No notable events documented      BP   96/62   Temp     Pulse  85   Resp   18   SpO2   97

## 2023-01-10 ENCOUNTER — OFFICE VISIT (OUTPATIENT)
Dept: PLASTIC SURGERY | Facility: CLINIC | Age: 73
End: 2023-01-10

## 2023-01-10 DIAGNOSIS — Z42.1 AFTERCARE POSTMASTECTOMY FOR BREAST RECONSTRUCTION: Primary | ICD-10-CM

## 2023-01-10 NOTE — PROGRESS NOTES
Assessment/Plan: Please see HPI  Overall, she is doing well, and remains pleased with the results of her bilateral breast surgery  We again discussed the potential for fat grafting  Addressed the minor irregularities of the reconstructed right breast, she would prefer to leave well enough alone  We talked about MRI surveillance of the implants 5 years after placement, the right breast implant was placed in April 2019, the left breast implant was placed in December 2019  We will have Lashonda Humphreys return in 1 year for a follow-up visit, sooner should the need arise  There are no diagnoses linked to this encounter  Subjective: Breast reconstruction     Patient ID: Karishma Hardy is a 81 y o  female  HPI Lashonda Humphreys presents for a follow-up visit, briefly, she is status post right mastectomy and immediate reconstruction utilizing a tissue expander and acellular dermal matrix from July 2018 (Dr Micaela White)  This was followed by right breast tissue expander/implant placement and capsulectomy in April 2019, and had balancing left breast augmentation in December 2019  She remains pleased with the results  The following portions of the patient's history were reviewed and updated as appropriate: allergies, current medications, past family history, past medical history, past social history, past surgical history and problem list     Review of Systems   Constitutional: Negative for chills and fever  HENT: Negative for hearing loss  Eyes: Negative for discharge and visual disturbance  Respiratory: Negative for chest tightness and shortness of breath  Cardiovascular: Negative for chest pain and leg swelling  Gastrointestinal: Negative for blood in stool, constipation, diarrhea and nausea  Genitourinary: Negative for dysuria  Musculoskeletal: Negative for gait problem  Skin: Negative for rash  Allergic/Immunologic: Negative for immunocompromised state     Neurological: Negative for seizures and headaches  Hematological: Does not bruise/bleed easily  Psychiatric/Behavioral: Negative for dysphoric mood  The patient is not nervous/anxious  Objective:      LMP 01/01/2000 (Within Months)          Physical Exam  Constitutional:       Appearance: Normal appearance  HENT:      Head: Normocephalic  Eyes:      Extraocular Movements: Extraocular movements intact  Pupils: Pupils are equal, round, and reactive to light  Cardiovascular:      Rate and Rhythm: Normal rate  Pulmonary:      Effort: Pulmonary effort is normal    Abdominal:      Palpations: Abdomen is soft  Musculoskeletal:         General: Normal range of motion  Cervical back: Normal range of motion and neck supple  Skin:     General: Skin is warm  Comments: Reconstructed right breast with implant in good position, breast is soft, supple and without evidence of capsular contracture, implant in place left breast/status post balancing augmentation procedure, the implant is in good position, the breasts are soft, supple and without evidence of capsular contracture  There is very good breast symmetry  See photos   Neurological:      Mental Status: She is alert and oriented to person, place, and time     Psychiatric:         Mood and Affect: Mood normal

## 2023-01-10 NOTE — LETTER
January 10, 2023     Audrey Paula MD  [address]    Patient: Babita Molina   YOB: 1950   Date of Visit: 1/10/2023       Dear Dr Avalos Every:    Thank you for referring [facility] Do to me for evaluation  Below are my notes for this consultation  If you have questions, please do not hesitate to call me  I look forward to following your patient along with you  Sincerely,        Venancio Layton MD        CC: No Recipients  Venancio Layton MD  1/10/2023  8:46 AM  Incomplete  Assessment/Plan: Please see HPI  Overall, she is doing well, and remains pleased with the results of her bilateral breast surgery  We again discussed the potential for fat grafting  Addressed the minor irregularities of the reconstructed right breast, she would prefer to leave well enough alone  We talked about MRI surveillance of the implants 5 years after placement, the right breast implant was placed in April 2019, the left breast implant was placed in December 2019  We will have Derrek Hunt return in 1 year for a follow-up visit, sooner should the need arise  There are no diagnoses linked to this encounter  Subjective: Breast reconstruction    Patient ID: Babita Molina is a 81 y o  female  HPI Derrek Hunt presents for a follow-up visit, briefly, she is status post right mastectomy and immediate reconstruction utilizing a tissue expander and acellular dermal matrix from July 2018 (Dr Avalos Every)  This was followed by right breast tissue expander/implant placement and capsulectomy in April 2019, and had balancing left breast augmentation in December 2019  She remains pleased with the results      The following portions of the patient's history were reviewed and updated as appropriate: allergies, current medications, past family history, past medical history, past social history, past surgical history and problem list     Review of Systems   Constitutional: Negative for chills and fever  HENT: Negative for hearing loss  Eyes: Negative for discharge and visual disturbance  Respiratory: Negative for chest tightness and shortness of breath  Cardiovascular: Negative for chest pain and leg swelling  Gastrointestinal: Negative for blood in stool, constipation, diarrhea and nausea  Genitourinary: Negative for dysuria  Musculoskeletal: Negative for gait problem  Skin: Negative for rash  Allergic/Immunologic: Negative for immunocompromised state  Neurological: Negative for seizures and headaches  Hematological: Does not bruise/bleed easily  Psychiatric/Behavioral: Negative for dysphoric mood  The patient is not nervous/anxious  Objective:      LMP 01/01/2000 (Within Months)         Physical Exam  Constitutional:       Appearance: Normal appearance  HENT:      Head: Normocephalic  Eyes:      Extraocular Movements: Extraocular movements intact  Pupils: Pupils are equal, round, and reactive to light  Cardiovascular:      Rate and Rhythm: Normal rate  Pulmonary:      Effort: Pulmonary effort is normal    Abdominal:      Palpations: Abdomen is soft  Musculoskeletal:         General: Normal range of motion  Cervical back: Normal range of motion and neck supple  Skin:     General: Skin is warm  Comments: Reconstructed right breast with implant in good position, breast is soft, supple and without evidence of capsular contracture, implant in place left breast/status post balancing augmentation procedure, the implant is in good position, the breasts are soft, supple and without evidence of capsular contracture  There is very good breast symmetry  See photos   Neurological:      Mental Status: She is alert and oriented to person, place, and time     Psychiatric:         Mood and Affect: Mood normal

## 2023-02-21 ENCOUNTER — OFFICE VISIT (OUTPATIENT)
Dept: SURGICAL ONCOLOGY | Facility: CLINIC | Age: 73
End: 2023-02-21

## 2023-02-21 VITALS
OXYGEN SATURATION: 94 % | SYSTOLIC BLOOD PRESSURE: 132 MMHG | HEIGHT: 65 IN | BODY MASS INDEX: 19.16 KG/M2 | HEART RATE: 70 BPM | RESPIRATION RATE: 16 BRPM | DIASTOLIC BLOOD PRESSURE: 78 MMHG | TEMPERATURE: 97.4 F | WEIGHT: 115 LBS

## 2023-02-21 DIAGNOSIS — Z12.39 ENCOUNTER FOR BREAST CANCER SCREENING OTHER THAN MAMMOGRAM: ICD-10-CM

## 2023-02-21 DIAGNOSIS — R92.2 DENSE BREAST: ICD-10-CM

## 2023-02-21 DIAGNOSIS — Z86.000 HISTORY OF DUCTAL CARCINOMA IN SITU (DCIS) OF BREAST: ICD-10-CM

## 2023-02-21 DIAGNOSIS — Z08 ENCOUNTER FOR FOLLOW-UP EXAMINATION AFTER COMPLETED TREATMENT FOR MALIGNANT NEOPLASM: Primary | ICD-10-CM

## 2023-02-21 NOTE — PROGRESS NOTES
Surgical Oncology Follow Up       [address] [facility]  [facility] [place]  [address] [facility]  [place] [address]    Tiffani Cam Walk  1950  [id number]  [address]  [facility] [place]  [address] [place] [address]    No chief complaint on file  Assessment/Plan:  1  Encounter for follow-up examination after completed treatment for malignant neoplasm  - 6 month follow up    2  History of ductal carcinoma in situ (DCIS) of breast  - Mammo diagnostic left w 3d & cad; Future    3  Encounter for breast cancer screening other than mammogram  - US breast screening bilateral complete (ABUS); Future    4  Dense breast  - US breast screening bilateral complete (ABUS); Future       Discussion/Summary: Patient is a 70-year-old female presenting today for six-month follow-up for right breast cancer diagnosed in 2018  Pathology revealed DCIS ER/CT negative  She had a right mastectomy with sentinel node biopsy with Dr Mary Bal and had immediate reconstruction with Dr Kirstie Portillo  She will be due for a mammogram in July as well as an ABUS, as discussed at her last visit  There were no concerns on her cbe  I will see the patient back in 6 months or sooner should the need arise  She was instructed to call with any questions or concerns prior to this time  All questions were answered today          History of Present Illness:     Oncology History   History of ductal carcinoma in situ (DCIS) of breast   7/17/2018 Biopsy    Right breast biopsy  Upper inner breast  DCIS  Grade 3  ER 0  CT 0  Stage 0     9/12/2018 Surgery    Right mastectomy with sentinel lymph node biopsy  Ductal carcinoma in situ  1 6 cm  Grade 3  ER 0  CT 0  0/1 lymph node  Stage 0    Immediate reconstruction with Dr Kirstie Portillo (tissue expander)     10/1/2018 - 10/1/2018 Radiation    Radiation therapy not recommended          -Interval History: Patient is a 70-year-old female presenting today for six-month follow-up for right breast cancer diagnosed in 2018  She is on observation  Patient denies changes on her breast exam  She denies persistent headaches, bone pain, back pain, shortness of breath, or abdominal pain  Review of Systems:  Review of Systems   Constitutional: Negative for activity change, appetite change, fatigue and unexpected weight change  Respiratory: Negative for cough and shortness of breath  Cardiovascular: Negative for chest pain  Gastrointestinal: Negative for abdominal pain, diarrhea, nausea and vomiting  Endocrine: Negative for heat intolerance  Musculoskeletal: Negative for arthralgias, back pain and myalgias  Skin: Negative for rash  Neurological: Negative for weakness and headaches  Hematological: Negative for adenopathy         Patient Active Problem List   Diagnosis   • History of ductal carcinoma in situ (DCIS) of breast   • Personal history of malignant neoplasm of breast   • Encounter for follow-up examination after completed treatment for malignant neoplasm   • History of breast cancer   • Immunization due   • Seborrheic keratosis   • Pre-op evaluation   • Age-related cataract of both eyes     Past Medical History:   Diagnosis Date   • Asthma    • Breast cancer ([facility] [address] ) 2018    right   • Cancer ([facility] [address] )    • Childhood asthma     as a child   • PONV (postoperative nausea and vomiting)     Patient requests to be premedicated for N/V that can last 2 weeks post surgeries   • Wears glasses      Past Surgical History:   Procedure Laterality Date   • AUGMENTATION BREAST Left 12/02/2019    Procedure: BREAST AUGMENTATION;  Surgeon: Fabiana Snow MD;  [facility];  Service: Plastics   • AUGMENTATION MAMMAPLASTY Left 2019   • BREAST BIOPSY  2019   • BREAST CYST ASPIRATION Left     2019   • BREAST IMPLANT Right 04/16/2019    Procedure: BREAST TISSUE EXPANDER/IMPLANT EXCHANGE; BREAST CAPSULECTOMY;  Surgeon: Fabiana Snow MD; [facility];  Service: Plastics   • MAMMO STEREOTACTIC BREAST BIOPSY RIGHT (ALL INC) Right 07/17/2018   • MASTECTOMY Right 2018   • MASTECTOMY W/ SENTINEL NODE BIOPSY Right 09/12/2018    Procedure: BREAST MASTECTOMY; LYMPHATIC MAPPING WITH BLUE DYE AND RADIOACTIVE DYE; SENTINEL LYMPH NODE BIOPSY (INJECT AT 0730 BY DR BALL IN THE OR); Surgeon: James Mann MD;  [facility];  Service: Surgical Oncology   • VA IMPLNT BIO IMPLNT FOR SOFT TISSUE REINFORCEMENT Right 09/12/2018    Procedure: ACELLULAR DERMAL MATRIX;  Surgeon: Marcello Mcduffie MD;  [facility];  Service: Plastics   • VA TISSUE EXPANDER PLACEMENT BREAST RECONSTRUCTION Right 09/12/2018    Procedure: BREAST IMMEDIATE RECONSTRUCTION, TISSUE EXPANDER;  Surgeon: Marcello Mcduffie MD;  [facility];  Service: Plastics   • US GUIDANCE BREAST BIOPSY LEFT EACH ADDITIONAL Left 07/01/2019   • US GUIDED BREAST BIOPSY LEFT COMPLETE Left 07/01/2019   • WISDOM TOOTH EXTRACTION       Family History   Problem Relation Age of Onset   • Hypertension Father    • Prostate cancer Father    • No Known Problems Sister    • Melanoma Sister    • No Known Problems Sister    • No Known Problems Sister    • No Known Problems Maternal Aunt      Social History     Socioeconomic History   • Marital status:      Spouse name: Not on file   • Number of children: Not on file   • Years of education: Not on file   • Highest education level: Not on file   Occupational History   • Not on file   Tobacco Use   • Smoking status: Never   • Smokeless tobacco: Never   Vaping Use   • Vaping Use: Never used   Substance and Sexual Activity   • Alcohol use:  Yes     Alcohol/week: 1 0 standard drink     Types: 1 Standard drinks or equivalent per week     Comment: Or less   • Drug use: No   • Sexual activity: Yes     Partners: Male     Birth control/protection: Post-menopausal   Other Topics Concern   • Not on file   Social History Narrative   • Not on file Social Determinants of Health     Financial Resource Strain: Not on file   Food Insecurity: Not on file   Transportation Needs: Not on file   Physical Activity: Not on file   Stress: Not on file   Social Connections: Not on file   Intimate Partner Violence: Not on file   Housing Stability: Not on file       Current Outpatient Medications:   •  Probiotic Product (PROBIOTIC-10 PO), Take 1 capsule by mouth daily in the early morning  , Disp: , Rfl:   •  ketorolac (ACULAR) 0 5 % ophthalmic solution, , Disp: , Rfl:   •  polymyxin b-trimethoprim (POLYTRIM) ophthalmic solution, , Disp: , Rfl:   •  prednisoLONE acetate (PRED FORTE) 1 % ophthalmic suspension, , Disp: , Rfl:   No Known Allergies  Vitals:    02/21/23 0826   BP: 132/78   Pulse: 70   Resp: 16   Temp: (!) 97 4 °F (36 3 °C)   SpO2: 94%       Physical Exam  Constitutional:       General: She is not in acute distress  Appearance: Normal appearance  Cardiovascular:      Rate and Rhythm: Normal rate and regular rhythm  Pulses: Normal pulses  Heart sounds: Normal heart sounds  Pulmonary:      Effort: Pulmonary effort is normal       Breath sounds: Normal breath sounds  Chest:      Chest wall: No mass  Breasts:     Right: No swelling, bleeding, inverted nipple, mass, nipple discharge, skin change or tenderness  Left: No swelling, bleeding, inverted nipple, mass, nipple discharge, skin change or tenderness  Abdominal:      General: Abdomen is flat  Palpations: Abdomen is soft  Lymphadenopathy:      Upper Body:      Right upper body: No supraclavicular, axillary or pectoral adenopathy  Left upper body: No supraclavicular, axillary or pectoral adenopathy  Skin:     General: Skin is warm  Neurological:      General: No focal deficit present  Mental Status: She is alert and oriented to person, place, and time     Psychiatric:         Mood and Affect: Mood normal          Behavior: Behavior normal  Results:    Imaging  No results found  I reviewed the above imaging data  Advance Care Planning/Advance Directives:  Discussed disease status, cancer treatment plans and/or cancer treatment goals with the patient

## 2023-07-11 ENCOUNTER — HOSPITAL ENCOUNTER (OUTPATIENT)
Dept: RADIOLOGY | Facility: HOSPITAL | Age: 73
Discharge: HOME | End: 2023-07-11

## 2023-07-12 ENCOUNTER — HOSPITAL ENCOUNTER (OUTPATIENT)
Dept: RADIOLOGY | Facility: HOSPITAL | Age: 73
Discharge: HOME | End: 2023-07-12
Payer: MEDICARE

## 2023-07-12 DIAGNOSIS — Z12.39 ENCOUNTER FOR BREAST CANCER SCREENING OTHER THAN MAMMOGRAM: ICD-10-CM

## 2023-07-12 DIAGNOSIS — Z86.000 HISTORY OF DUCTAL CARCINOMA IN SITU (DCIS) OF BREAST: ICD-10-CM

## 2023-07-12 DIAGNOSIS — R92.2 DENSE BREAST: ICD-10-CM

## 2023-07-12 PROCEDURE — G0279 TOMOSYNTHESIS, MAMMO: HCPCS

## 2023-07-12 PROCEDURE — 76641 ULTRASOUND BREAST COMPLETE: CPT

## 2023-07-12 PROCEDURE — 77065 DX MAMMO INCL CAD UNI: CPT

## 2023-08-29 ENCOUNTER — OFFICE VISIT (OUTPATIENT)
Dept: SURGICAL ONCOLOGY | Facility: CLINIC | Age: 73
End: 2023-08-29
Payer: MEDICARE

## 2023-08-29 VITALS
WEIGHT: 117.5 LBS | HEIGHT: 65 IN | TEMPERATURE: 96.6 F | OXYGEN SATURATION: 98 % | DIASTOLIC BLOOD PRESSURE: 80 MMHG | RESPIRATION RATE: 15 BRPM | SYSTOLIC BLOOD PRESSURE: 136 MMHG | BODY MASS INDEX: 19.58 KG/M2 | HEART RATE: 65 BPM

## 2023-08-29 DIAGNOSIS — Z86.000 HISTORY OF DUCTAL CARCINOMA IN SITU (DCIS) OF BREAST: ICD-10-CM

## 2023-08-29 DIAGNOSIS — Z85.3 HISTORY OF BREAST CANCER: ICD-10-CM

## 2023-08-29 DIAGNOSIS — Z85.3 PERSONAL HISTORY OF MALIGNANT NEOPLASM OF BREAST: ICD-10-CM

## 2023-08-29 DIAGNOSIS — Z12.39 ENCOUNTER FOR BREAST CANCER SCREENING OTHER THAN MAMMOGRAM: ICD-10-CM

## 2023-08-29 DIAGNOSIS — R92.2 DENSE BREAST: ICD-10-CM

## 2023-08-29 DIAGNOSIS — Z08 ENCOUNTER FOR FOLLOW-UP EXAMINATION AFTER COMPLETED TREATMENT FOR MALIGNANT NEOPLASM: Primary | ICD-10-CM

## 2023-08-29 DIAGNOSIS — Z12.31 VISIT FOR SCREENING MAMMOGRAM: ICD-10-CM

## 2023-08-29 PROCEDURE — 99213 OFFICE O/P EST LOW 20 MIN: CPT

## 2023-08-29 NOTE — PROGRESS NOTES
Surgical Oncology Follow Up       [address] [facility]  [facility] [place] [address] [address]  [facility] [address]    Brenda Paris[address] Walk  1950  [id number]  [address] [facility]  [facility] [address]    Chief Complaint   Patient presents with   • Follow-up       Assessment/Plan:  1. Encounter for follow-up examination after completed treatment for malignant neoplasm  - 1 year follow up    2. History of breast cancer    3. Personal history of malignant neoplasm of breast    4. History of ductal carcinoma in situ (DCIS) of breast    5. Visit for screening mammogram  - Mammo screening left w 3d & cad; Future    6. Encounter for breast cancer screening other than mammogram  - US breast left complete (abus); Future    7. Dense breast  - US breast left complete (abus); Future    Discussion/Summary: Patient is a 53-year-old female presenting today for six-month follow-up for right breast cancer diagnosed in 2018. Pathology revealed DCIS ER/IN negative. She had a right mastectomy with sentinel node biopsy with Dr. Chidi Luevano and [facility] She had a diagnostic mammogram and ultrasound of the left breast on 7/17/2023 which was BI-RADS 2 category 3 density. ABUS was benign. She is now 5 years out from her diagnosis so she may be seen annually now. There were no concerns on her cbe. I will see the patient back in 1 year or sooner should the need arise. She was instructed to call with any questions or concerns prior to this time.  All questions were answered today.        History of Present Illness:     Oncology History   History of ductal carcinoma in situ (DCIS) of breast   7/17/2018 Biopsy    Right breast biopsy  Upper inner breast  DCIS  Grade 3  ER 0  IN 0  Stage 0     9/12/2018 Surgery    Right mastectomy with sentinel lymph node biopsy  Ductal carcinoma in situ  1.6 cm  Grade 3  ER 0  KS 0  0/1 lymph node  Stage 0    Immediate reconstruction with Dr. Troy Lo (tissue expander)     10/1/2018 - 10/1/2018 Radiation    Radiation therapy not recommended          -Interval History  Patient is a 82-year-old female presenting today for six-month follow-up for right breast cancer diagnosed in 2018. She had a diagnostic mammogram and ultrasound of the left breast on 7/17/2023 which was BI-RADS 2 category 3 density. ABUS was benign. Patient denies changes on her breast exam. She denies persistent headaches, bone pain, back pain, shortness of breath, or abdominal pain. Review of Systems:  Review of Systems   Constitutional: Negative for activity change, appetite change, fatigue and unexpected weight change. Respiratory: Negative for cough and shortness of breath. Cardiovascular: Negative for chest pain. Gastrointestinal: Negative for abdominal pain, diarrhea, nausea and vomiting. Endocrine: Negative for heat intolerance. Musculoskeletal: Negative for arthralgias, back pain and myalgias. Skin: Negative for rash. Neurological: Negative for weakness and headaches. Hematological: Negative for adenopathy.        Patient Active Problem List   Diagnosis   • History of ductal carcinoma in situ (DCIS) of breast   • Personal history of malignant neoplasm of breast   • Encounter for follow-up examination after completed treatment for malignant neoplasm   • History of breast cancer   • Immunization due   • Seborrheic keratosis   • Pre-op evaluation   • Age-related cataract of both eyes     Past Medical History:   Diagnosis Date   • Asthma    • Breast cancer ([address]) 2018    right   • Cancer ([address])    • Childhood asthma     as a child   • PONV (postoperative nausea and vomiting)     Patient requests to be premedicated for N/V that can last 2 weeks post surgeries   • Wears glasses      Past Surgical History:   Procedure Laterality Date   • AUGMENTATION BREAST Left 12/02/2019    Procedure: BREAST AUGMENTATION;  Surgeon: Sofía Bal MD;  [facility];  Service: Plastics   • AUGMENTATION MAMMAPLASTY Left 2019   • BREAST BIOPSY  2019   • BREAST CYST ASPIRATION Left     2019   • BREAST IMPLANT Right 04/16/2019    Procedure: BREAST TISSUE EXPANDER/IMPLANT EXCHANGE; BREAST CAPSULECTOMY;  Surgeon: Sofía Bal MD;  [facility];  Service: Plastics   • MAMMO STEREOTACTIC BREAST BIOPSY RIGHT (ALL INC) Right 07/17/2018   • MASTECTOMY Right 2018   • MASTECTOMY W/ SENTINEL NODE BIOPSY Right 09/12/2018    Procedure: BREAST MASTECTOMY; LYMPHATIC MAPPING WITH BLUE DYE AND RADIOACTIVE DYE; SENTINEL LYMPH NODE BIOPSY (INJECT AT 0730 BY DR BALL IN THE OR);   Surgeon: Duane Ahumada MD;  [facility];  Service: Surgical Oncology   • IA IMPLNT BIO IMPLNT FOR SOFT TISSUE REINFORCEMENT Right 09/12/2018    Procedure: ACELLULAR DERMAL MATRIX;  Surgeon: Sofía Bal MD;  [facility];  Service: Plastics   • IA TISSUE EXPANDER PLACEMENT BREAST RECONSTRUCTION Right 09/12/2018    Procedure: BREAST IMMEDIATE RECONSTRUCTION, TISSUE EXPANDER;  Surgeon: Sofía Bal MD;  [facility];  Service: Plastics   • US GUIDANCE BREAST BIOPSY LEFT EACH ADDITIONAL Left 07/01/2019   • US GUIDED BREAST BIOPSY LEFT COMPLETE Left 07/01/2019   • WISDOM TOOTH EXTRACTION       Family History   Problem Relation Age of Onset   • Hypertension Father    • Prostate cancer Father    • No Known Problems Sister    • Melanoma Sister    • No Known Problems Sister    • No Known Problems Sister    • No Known Problems Maternal Aunt      Social History     Socioeconomic History   • Marital status:      Spouse name: Not on file   • Number of children: Not on file   • Years of education: Not on file   • Highest education level: Not on file   Occupational History   • Not on file   Tobacco Use   • Smoking status: Never   • Smokeless tobacco: Never   Vaping Use   • Vaping Use: Never used   Substance and Sexual Activity   • Alcohol use: Yes     Alcohol/week: 1.0 standard drink of alcohol     Types: 1 Standard drinks or equivalent per week     Comment: Or less   • Drug use: No   • Sexual activity: Yes     Partners: Male     Birth control/protection: Post-menopausal   Other Topics Concern   • Not on file   Social History Narrative   • Not on file     Social Determinants of Health     Financial Resource Strain: Not on file   Food Insecurity: Not on file   Transportation Needs: Not on file   Physical Activity: Not on file   Stress: Not on file   Social Connections: Not on file   Intimate Partner Violence: Not on file   Housing Stability: Not on file       Current Outpatient Medications:   •  Probiotic Product (PROBIOTIC-10 PO), Take 1 capsule by mouth daily in the early morning  , Disp: , Rfl:   •  ketorolac (ACULAR) 0.5 % ophthalmic solution, , Disp: , Rfl:   •  polymyxin b-trimethoprim (POLYTRIM) ophthalmic solution, , Disp: , Rfl:   •  prednisoLONE acetate (PRED FORTE) 1 % ophthalmic suspension, , Disp: , Rfl:   No Known Allergies  Vitals:    08/29/23 0823   BP: 136/80   Pulse: 65   Resp: 15   Temp: (!) 96.6 °F (35.9 °C)   SpO2: 98%       Physical Exam  Constitutional:       General: She is not in acute distress. Appearance: Normal appearance. Cardiovascular:      Rate and Rhythm: Normal rate and regular rhythm. Pulses: Normal pulses. Heart sounds: Normal heart sounds. Pulmonary:      Effort: Pulmonary effort is normal.      Breath sounds: Normal breath sounds. Chest:      Chest wall: No mass. Breasts:     Right: No swelling, bleeding, inverted nipple, mass, nipple discharge, skin change or tenderness. Left: No swelling, bleeding, inverted nipple, mass, nipple discharge, skin change or tenderness. Abdominal:      General: Abdomen is flat. Palpations: Abdomen is soft. Lymphadenopathy:      Upper Body:      Right upper body: No supraclavicular, axillary or pectoral adenopathy. Left upper body: No supraclavicular, axillary or pectoral adenopathy. Skin:     General: Skin is warm. Neurological:      General: No focal deficit present. Mental Status: She is alert and oriented to person, place, and time. Psychiatric:         Mood and Affect: Mood normal.         Behavior: Behavior normal.           Results:    Imaging  No results found. I reviewed the above imaging data. Advance Care Planning/Advance Directives:  Discussed disease status, cancer treatment plans and/or cancer treatment goals with the patient.

## 2023-11-08 ENCOUNTER — OFFICE VISIT (OUTPATIENT)
Dept: FAMILY MEDICINE CLINIC | Facility: CLINIC | Age: 73
End: 2023-11-08
Payer: MEDICARE

## 2023-11-08 VITALS
RESPIRATION RATE: 16 BRPM | OXYGEN SATURATION: 95 % | HEIGHT: 65 IN | BODY MASS INDEX: 18.83 KG/M2 | WEIGHT: 113 LBS | HEART RATE: 82 BPM | SYSTOLIC BLOOD PRESSURE: 122 MMHG | DIASTOLIC BLOOD PRESSURE: 62 MMHG | TEMPERATURE: 98 F

## 2023-11-08 DIAGNOSIS — Z86.010 HISTORY OF COLON POLYPS: Primary | ICD-10-CM

## 2023-11-08 DIAGNOSIS — Z85.3 PERSONAL HISTORY OF MALIGNANT NEOPLASM OF BREAST: ICD-10-CM

## 2023-11-08 DIAGNOSIS — Z00.00 MEDICARE ANNUAL WELLNESS VISIT, SUBSEQUENT: ICD-10-CM

## 2023-11-08 DIAGNOSIS — H61.23 BILATERAL IMPACTED CERUMEN: ICD-10-CM

## 2023-11-08 DIAGNOSIS — M85.89 OSTEOPENIA OF MULTIPLE SITES: ICD-10-CM

## 2023-11-08 PROBLEM — Z23 IMMUNIZATION DUE: Status: RESOLVED | Noted: 2022-08-04 | Resolved: 2023-11-08

## 2023-11-08 PROBLEM — Z86.0100 HISTORY OF COLON POLYPS: Status: ACTIVE | Noted: 2023-11-08

## 2023-11-08 PROBLEM — Z01.818 PRE-OP EVALUATION: Status: RESOLVED | Noted: 2022-11-08 | Resolved: 2023-11-08

## 2023-11-08 PROBLEM — Z08 ENCOUNTER FOR FOLLOW-UP EXAMINATION AFTER COMPLETED TREATMENT FOR MALIGNANT NEOPLASM: Status: RESOLVED | Noted: 2020-01-15 | Resolved: 2023-11-08

## 2023-11-08 PROCEDURE — G0439 PPPS, SUBSEQ VISIT: HCPCS | Performed by: FAMILY MEDICINE

## 2023-11-08 RX ORDER — SODIUM FLUORIDE 6 MG/ML
PASTE, DENTIFRICE DENTAL
COMMUNITY
Start: 2023-09-02

## 2023-11-08 NOTE — PROGRESS NOTES
Name: Bryon Client      : 1950      MRN: [id number]  Encounter Provider: Adriana Stokes MD  Encounter Date: 2023   Encounter department: [facility] [address] MEDICINE    Assessment & Plan     1. History of colon polyps  Assessment & Plan:  Colonoscopy due       2. Osteopenia of multiple sites  Assessment & Plan:  Vit d 2000 units qd and calcium rich foods and weight beating exercise      3. Medicare annual wellness visit, subsequent  -     Basic metabolic panel; Future    4. Personal history of malignant neoplasm of breast  Assessment & Plan:  Followed by surgical oncology  note reviewed       5. Bilateral impacted cerumen  Assessment & Plan:  Otc drops as directed follow up 1 week removal           Depression Screening and Follow-up Plan: Patient was screened for depression during today's encounter. They screened negative with a PHQ-2 score of 0. Subjective      HPI  Review of Systems   HENT:  Positive for hearing loss. Mouth sores: decrease hearing clogged feeling.        Current Outpatient Medications on File Prior to Visit   Medication Sig   • PreviDent 5000 Booster Plus 1.1 % PSTE USE PEA SIZE QUANTITY AND DO NOT RINSE AFTER BRUSHING   • Probiotic Product (PROBIOTIC-10 PO) Take 1 capsule by mouth daily in the early morning     • [DISCONTINUED] ketorolac (ACULAR) 0.5 % ophthalmic solution  (Patient not taking: Reported on 2023)   • [DISCONTINUED] polymyxin b-trimethoprim (POLYTRIM) ophthalmic solution  (Patient not taking: Reported on 2023)   • [DISCONTINUED] prednisoLONE acetate (PRED FORTE) 1 % ophthalmic suspension  (Patient not taking: Reported on 2023)       Objective     /62 (BP Location: Left arm, Patient Position: Sitting, Cuff Size: Standard)   Pulse 82   Temp 98 °F (36.7 °C) (Tympanic)   Resp 16   Ht 5' 5" (1.651 m)   Wt 51.3 kg (113 lb)   LMP 2000 (Within Months)   SpO2 95%   BMI 18.80 kg/m²     Physical Exam  Constitutional: General: She is not in acute distress. Appearance: Normal appearance. She is well-developed. She is not ill-appearing. HENT:      Right Ear: There is impacted cerumen. Left Ear: There is impacted cerumen. Eyes:      Extraocular Movements: Extraocular movements intact. Pupils: Pupils are equal, round, and reactive to light. Cardiovascular:      Rate and Rhythm: Normal rate and regular rhythm. Pulses: Normal pulses. Heart sounds: Normal heart sounds. No murmur heard. Pulmonary:      Effort: Pulmonary effort is normal.      Breath sounds: Normal breath sounds. Musculoskeletal:      Right lower leg: No edema. Left lower leg: No edema. Neurological:      General: No focal deficit present. Mental Status: She is alert and oriented to person, place, and time. Mental status is at baseline. Psychiatric:         Mood and Affect: Mood normal.         Behavior: Behavior normal.         Thought Content:  Thought content normal.       Eli Watson MD

## 2023-11-08 NOTE — PROGRESS NOTES
Assessment and Plan:     Problem List Items Addressed This Visit        Nervous and Auditory    Bilateral impacted cerumen     Otc drops as directed follow up 1 week removal             Musculoskeletal and Integument    Osteopenia of multiple sites     Vit d 2000 units qd and calcium rich foods and weight beating exercise            Other    Personal history of malignant neoplasm of breast     Followed by surgical oncology 8/23 note reviewed          Medicare annual wellness visit, subsequent    Relevant Orders    Basic metabolic panel    History of colon polyps - Primary     Colonoscopy due 12/25            Depression Screening and Follow-up Plan: Patient was screened for depression during today's encounter. They screened negative with a PHQ-2 score of 0. Preventive health issues were discussed with patient, and age appropriate screening tests were ordered as noted in patient's After Visit Summary. Personalized health advice and appropriate referrals for health education or preventive services given if needed, as noted in patient's After Visit Summary.      History of Present Illness:     Patient presents for a Medicare Wellness Visit    HPI   Patient Care Team:  Clara Nair MD as PCP - General (Family Medicine)  Bryanna Keller MD as Surgeon (Oncology)  Katelynn Wells MD (Plastic Surgery)  Miguel Jasso MD (Obstetrics and Gynecology)     Review of Systems:     Review of Systems     Problem List:     Patient Active Problem List   Diagnosis   • History of ductal carcinoma in situ (DCIS) of breast   • Personal history of malignant neoplasm of breast   • Medicare annual wellness visit, subsequent   • Seborrheic keratosis   • Age-related cataract of both eyes   • History of colon polyps   • Osteopenia of multiple sites   • Bilateral impacted cerumen      Past Medical and Surgical History:     Past Medical History:   Diagnosis Date   • Asthma    • Breast cancer ([address]) 2018    right   • Cancer [facility])    • Childhood asthma     as a child   • PONV (postoperative nausea and vomiting)     Patient requests to be premedicated for N/V that can last 2 weeks post surgeries   • Wears glasses      Past Surgical History:   Procedure Laterality Date   • AUGMENTATION BREAST Left 12/02/2019    Procedure: BREAST AUGMENTATION;  Surgeon: Bassam Soriano MD;  [facility];  Service: Plastics   • AUGMENTATION MAMMAPLASTY Left 2019   • BREAST BIOPSY  2019   • BREAST CYST ASPIRATION Left     2019   • BREAST IMPLANT Right 04/16/2019    Procedure: BREAST TISSUE EXPANDER/IMPLANT EXCHANGE; BREAST CAPSULECTOMY;  Surgeon: Bassam Soriano MD;  [facility];  Service: Plastics   • MAMMO STEREOTACTIC BREAST BIOPSY RIGHT (ALL INC) Right 07/17/2018   • MASTECTOMY Right 2018   • MASTECTOMY W/ SENTINEL NODE BIOPSY Right 09/12/2018    Procedure: BREAST MASTECTOMY; LYMPHATIC MAPPING WITH BLUE DYE AND RADIOACTIVE DYE; SENTINEL LYMPH NODE BIOPSY (INJECT AT 0730 BY DR BALL IN THE OR);   Surgeon: Liza Santiago MD;  [facility];  Service: Surgical Oncology   • OK IMPLNT BIO IMPLNT FOR SOFT TISSUE REINFORCEMENT Right 09/12/2018    Procedure: Sae Parks;  Surgeon: Bassam Soriano MD;  [facility];  Service: Plastics   • OK TISSUE EXPANDER PLACEMENT BREAST RECONSTRUCTION Right 09/12/2018    Procedure: BREAST IMMEDIATE RECONSTRUCTION, TISSUE EXPANDER;  Surgeon: Bassam Soriano MD;  [facility];  Service: Plastics   • US GUIDANCE BREAST BIOPSY LEFT EACH ADDITIONAL Left 07/01/2019   • US GUIDED BREAST BIOPSY LEFT COMPLETE Left 07/01/2019   • WISDOM TOOTH EXTRACTION        Family History:     Family History   Problem Relation Age of Onset   • Hypertension Father    • Prostate cancer Father    • No Known Problems Sister    • Melanoma Sister    • No Known Problems Sister    • No Known Problems Sister    • No Known Problems Maternal Aunt       Social History:     Social History     Socioeconomic History   • Marital status:      Spouse name: None   • Number of children: None   • Years of education: None   • Highest education level: None   Occupational History   • None   Tobacco Use   • Smoking status: Never   • Smokeless tobacco: Never   Vaping Use   • Vaping Use: Never used   Substance and Sexual Activity   • Alcohol use: Yes     Alcohol/week: 1.0 standard drink of alcohol     Types: 1 Standard drinks or equivalent per week     Comment: Or less   • Drug use: No   • Sexual activity: Yes     Partners: Male     Birth control/protection: Post-menopausal   Other Topics Concern   • None   Social History Narrative   • None     Social Determinants of Health     Financial Resource Strain: Not on file   Food Insecurity: Not on file   Transportation Needs: Not on file   Physical Activity: Not on file   Stress: Not on file   Social Connections: Not on file   Intimate Partner Violence: Not on file   Housing Stability: Not on file      Medications and Allergies:     Current Outpatient Medications   Medication Sig Dispense Refill   • PreviDent 5000 Booster Plus 1.1 % PSTE USE PEA SIZE QUANTITY AND DO NOT RINSE AFTER BRUSHING     • Probiotic Product (PROBIOTIC-10 PO) Take 1 capsule by mouth daily in the early morning         No current facility-administered medications for this visit. No Known Allergies   Immunizations:     Immunization History   Administered Date(s) Administered   • COVID-19 MODERNA VACC 0.25 ML IM BOOSTER 11/03/2021   • COVID-19 MODERNA VACC 0.5 ML IM 01/29/2021, 02/26/2021   • Pneumococcal Conjugate Vaccine 20-valent (Pcv20), Polysace 08/04/2022      Health Maintenance:         Topic Date Due   • Breast Cancer Screening: Mammogram  07/12/2024   • Colorectal Cancer Screening  12/01/2025   • Hepatitis C Screening  Completed         Topic Date Due   • COVID-19 Vaccine (4 - Moderna series) 12/29/2021      Medicare Screening Tests and Risk Assessments:     Praful Montelongo is here for her Subsequent Wellness visit. Health Risk Assessment:   Patient rates overall health as very good. Patient feels that their physical health rating is same. Patient is very satisfied with their life. Eyesight was rated as same. Hearing was rated as slightly worse. Patient feels that their emotional and mental health rating is same. Patients states they are never, rarely angry. Patient states they are sometimes unusually tired/fatigued. Pain experienced in the last 7 days has been some. Patient's pain rating has been 3/10. Patient states that she has experienced no weight loss or gain in last 6 months. Depression Screening:   PHQ-2 Score: 0      Fall Risk Screening: In the past year, patient has experienced: no history of falling in past year      Urinary Incontinence Screening:   Patient has not leaked urine accidently in the last six months. Home Safety:  Patient does not have trouble with stairs inside or outside of their home. Patient has working smoke alarms and has working carbon monoxide detector. Home safety hazards include: none. Nutrition:   Current diet is Regular. Medications:   Patient is currently taking over-the-counter supplements. OTC medications include: see medication list. Patient is able to manage medications. Activities of Daily Living (ADLs)/Instrumental Activities of Daily Living (IADLs):   Walk and transfer into and out of bed and chair?: Yes  Dress and groom yourself?: Yes    Bathe or shower yourself?: Yes    Feed yourself?  Yes  Do your laundry/housekeeping?: Yes  Manage your money, pay your bills and track your expenses?: Yes  Make your own meals?: Yes    Do your own shopping?: Yes    Previous Hospitalizations:   Any hospitalizations or ED visits within the last 12 months?: No      Advance Care Planning:   Living will: Yes    Advanced directive: Yes      Cognitive Screening:   Provider or family/friend/caregiver concerned regarding cognition?: No    PREVENTIVE SCREENINGS      Cardiovascular Screening:    General: Risks and Benefits Discussed    Due for: Lipid Panel      Diabetes Screening:     General: Risks and Benefits Discussed    Due for: Blood Glucose      Colorectal Cancer Screening:     General: Screening Current      Breast Cancer Screening:     General: History Breast Cancer and Screening Current      Cervical Cancer Screening:    General: Screening Not Indicated      Osteoporosis Screening:    General: Screening Current      Abdominal Aortic Aneurysm (AAA) Screening:        General: Screening Not Indicated      Lung Cancer Screening:     General: Screening Not Indicated      Hepatitis C Screening:    General: Screening Current    Screening, Brief Intervention, and Referral to Treatment (SBIRT)    Screening      AUDIT-C Screenin) How often did you have a drink containing alcohol in the past year? monthly or less  2) How many drinks did you have on a typical day when you were drinking in the past year? 1 to 2    Single Item Drug Screening:  How often have you used an illegal drug (including marijuana) or a prescription medication for non-medical reasons in the past year? never    Single Item Drug Screen Score: 0  Interpretation: Negative screen for possible drug use disorder    No results found.      Physical Exam:     /62 (BP Location: Left arm, Patient Position: Sitting, Cuff Size: Standard)   Pulse 82   Temp 98 °F (36.7 °C) (Tympanic)   Resp 16   Ht 5' 5" (1.651 m)   Wt 51.3 kg (113 lb)   LMP 2000 (Within Months)   SpO2 95%   BMI 18.80 kg/m²     Physical Exam     Adriana Stokes MD

## 2023-11-08 NOTE — PATIENT INSTRUCTIONS
Medicare Preventive Visit Patient Instructions  Thank you for completing your Welcome to Medicare Visit or Medicare Annual Wellness Visit today. Your next wellness visit will be due in one year (11/8/2024). The screening/preventive services that you may require over the next 5-10 years are detailed below. Some tests may not apply to you based off risk factors and/or age. Screening tests ordered at today's visit but not completed yet may show as past due. Also, please note that scanned in results may not display below. Preventive Screenings:  Service Recommendations Previous Testing/Comments   Colorectal Cancer Screening  * Colonoscopy    * Fecal Occult Blood Test (FOBT)/Fecal Immunochemical Test (FIT)  * Fecal DNA/Cologuard Test  * Flexible Sigmoidoscopy Age: 43-73 years old   Colonoscopy: every 10 years (may be performed more frequently if at higher risk)  OR  FOBT/FIT: every 1 year  OR  Cologuard: every 3 years  OR  Sigmoidoscopy: every 5 years  Screening may be recommended earlier than age 39 if at higher risk for colorectal cancer. Also, an individualized decision between you and your healthcare provider will decide whether screening between the ages of 77-80 would be appropriate. Colonoscopy: 12/02/2022  FOBT/FIT: Not on file  Cologuard: Not on file  Sigmoidoscopy: Not on file    Screening Current     Breast Cancer Screening Age: 36 years old  Frequency: every 1-2 years  Not required if history of left and right mastectomy Mammogram: 07/12/2023    History Breast Cancer  Screening Current   Cervical Cancer Screening Between the ages of 21-29, pap smear recommended once every 3 years. Between the ages of 32-69, can perform pap smear with HPV co-testing every 5 years.    Recommendations may differ for women with a history of total hysterectomy, cervical cancer, or abnormal pap smears in past. Pap Smear: 05/29/2018    Screening Not Indicated   Hepatitis C Screening Once for adults born between 1945 and 1965  More frequently in patients at high risk for Hepatitis C Hep C Antibody: 08/06/2022    Screening Current   Diabetes Screening 1-2 times per year if you're at risk for diabetes or have pre-diabetes Fasting glucose: 89 mg/dL (8/6/2022)  A1C: No results in last 5 years (No results in last 5 years)  Risks and Benefits Discussed  Due for Blood Glucose   Cholesterol Screening Once every 5 years if you don't have a lipid disorder. May order more often based on risk factors. Lipid panel: 08/06/2022    Risks and Benefits Discussed  Due for Lipid Panel     Other Preventive Screenings Covered by Medicare:  Abdominal Aortic Aneurysm (AAA) Screening: covered once if your at risk. You're considered to be at risk if you have a family history of AAA. Lung Cancer Screening: covers low dose CT scan once per year if you meet all of the following conditions: (1) Age 48-67; (2) No signs or symptoms of lung cancer; (3) Current smoker or have quit smoking within the last 15 years; (4) You have a tobacco smoking history of at least 20 pack years (packs per day multiplied by number of years you smoked); (5) You get a written order from a healthcare provider. Glaucoma Screening: covered annually if you're considered high risk: (1) You have diabetes OR (2) Family history of glaucoma OR (3)  aged 48 and older OR (3)  American aged 72 and older  Osteoporosis Screening: covered every 2 years if you meet one of the following conditions: (1) You're estrogen deficient and at risk for osteoporosis based off medical history and other findings; (2) Have a vertebral abnormality; (3) On glucocorticoid therapy for more than 3 months; (4) Have primary hyperparathyroidism; (5) On osteoporosis medications and need to assess response to drug therapy. Last bone density test (DXA Scan): 08/17/2022. HIV Screening: covered annually if you're between the age of 14-79.  Also covered annually if you are younger than 13 and older than 65 with risk factors for HIV infection. For pregnant patients, it is covered up to 3 times per pregnancy. Immunizations:  Immunization Recommendations   Influenza Vaccine Annual influenza vaccination during flu season is recommended for all persons aged >= 6 months who do not have contraindications   Pneumococcal Vaccine   * Pneumococcal conjugate vaccine = PCV13 (Prevnar 13), PCV15 (Vaxneuvance), PCV20 (Prevnar 20)  * Pneumococcal polysaccharide vaccine = PPSV23 (Pneumovax) Adults 38-24 yo with certain risk factors or if 69+ yo  If never received any pneumonia vaccine: recommend Prevnar 20 (PCV20)  Give PCV20 if previously received 1 dose of PCV13 or PPSV23   Hepatitis B Vaccine 3 dose series if at intermediate or high risk (ex: diabetes, end stage renal disease, liver disease)   Respiratory syncytial virus (RSV) Vaccine - COVERED BY MEDICARE PART D  * RSVPreF3 (Arexvy) CDC recommends that adults 61years of age and older may receive a single dose of RSV vaccine using shared clinical decision-making (SCDM)   Tetanus (Td) Vaccine - COST NOT COVERED BY MEDICARE PART B Following completion of primary series, a booster dose should be given every 10 years to maintain immunity against tetanus. Td may also be given as tetanus wound prophylaxis. Tdap Vaccine - COST NOT COVERED BY MEDICARE PART B Recommended at least once for all adults. For pregnant patients, recommended with each pregnancy. Shingles Vaccine (Shingrix) - COST NOT COVERED BY MEDICARE PART B  2 shot series recommended in those 19 years and older who have or will have weakened immune systems or those 50 years and older     Health Maintenance Due:      Topic Date Due   • Breast Cancer Screening: Mammogram  07/12/2024   • Colorectal Cancer Screening  12/01/2025   • Hepatitis C Screening  Completed     Immunizations Due:      Topic Date Due   • COVID-19 Vaccine (4 - Gregery Pun series) 12/29/2021     Advance Directives   What are advance directives? Advance directives are legal documents that state your wishes and plans for medical care. These plans are made ahead of time in case you lose your ability to make decisions for yourself. Advance directives can apply to any medical decision, such as the treatments you want, and if you want to donate organs. What are the types of advance directives? There are many types of advance directives, and each state has rules about how to use them. You may choose a combination of any of the following:  Living will: This is a written record of the treatment you want. You can also choose which treatments you do not want, which to limit, and which to stop at a certain time. This includes surgery, medicine, IV fluid, and tube feedings. Durable power of  for [facility]): This is a written record that states who you want to make healthcare choices for you when you are unable to make them for yourself. This person, called a proxy, is usually a family member or a friend. You may choose more than 1 proxy. Do not resuscitate (DNR) order:  A DNR order is used in case your heart stops beating or you stop breathing. It is a request not to have certain forms of treatment, such as CPR. A DNR order may be included in other types of advance directives. Medical directive: This covers the care that you want if you are in a coma, near death, or unable to make decisions for yourself. You can list the treatments you want for each condition. Treatment may include pain medicine, surgery, blood transfusions, dialysis, IV or tube feedings, and a ventilator (breathing machine). Values history: This document has questions about your views, beliefs, and how you feel and think about life. This information can help others choose the care that you would choose. Why are advance directives important? An advance directive helps you control your care. Although spoken wishes may be used, it is better to have your wishes written down. Spoken wishes can be misunderstood, or not followed. Treatments may be given even if you do not want them. An advance directive may make it easier for your family to make difficult choices about your care. © Copyright [employer] 2018 Information is for End User's use only and may not be sold, redistributed or otherwise used for commercial purposes.  All illustrations and images included in CareNotes® are the copyrighted property of [employer].D.A[employer] Inc[employer] or [address] [place] [address]

## 2023-11-10 ENCOUNTER — RA CDI HCC (OUTPATIENT)
Dept: OTHER | Facility: HOSPITAL | Age: 73
End: 2023-11-10

## 2023-11-14 ENCOUNTER — OFFICE VISIT (OUTPATIENT)
Dept: FAMILY MEDICINE CLINIC | Facility: CLINIC | Age: 73
End: 2023-11-14
Payer: MEDICARE

## 2023-11-14 VITALS
RESPIRATION RATE: 16 BRPM | SYSTOLIC BLOOD PRESSURE: 120 MMHG | HEART RATE: 76 BPM | TEMPERATURE: 97 F | WEIGHT: 113 LBS | BODY MASS INDEX: 18.83 KG/M2 | OXYGEN SATURATION: 95 % | HEIGHT: 65 IN | DIASTOLIC BLOOD PRESSURE: 72 MMHG

## 2023-11-14 DIAGNOSIS — H61.23 BILATERAL IMPACTED CERUMEN: Primary | ICD-10-CM

## 2023-11-14 PROCEDURE — 69209 REMOVE IMPACTED EAR WAX UNI: CPT | Performed by: FAMILY MEDICINE

## 2023-11-14 NOTE — PROGRESS NOTES
Name: Eliud Mejias      : 1950      MRN: [id number]  Encounter Provider: Jesús Stevens MD  Encounter Date: 2023   Encounter department: [facility] MEDICINE    Assessment & Plan     1. Bilateral impacted cerumen  Assessment & Plan:  Right  cleared left unable to clear needs to reuse drops to soften wax and follow up     Orders:  -     Ear cerumen removal        Depression Screening and Follow-up Plan: Patient was screened for depression during today's encounter. They screened negative with a PHQ-2 score of 0. Subjective      HPI here for earwax removal  Review of Systems   HENT:  Positive for hearing loss. Current Outpatient Medications on File Prior to Visit   Medication Sig   • PreviDent 5000 Booster Plus 1.1 % PSTE USE PEA SIZE QUANTITY AND DO NOT RINSE AFTER BRUSHING   • Probiotic Product (PROBIOTIC-10 PO) Take 1 capsule by mouth daily in the early morning         Objective     /72 (BP Location: Left arm, Patient Position: Sitting, Cuff Size: Standard)   Pulse 76   Temp (!) 97 °F (36.1 °C) (Tympanic)   Resp 16   Ht 5' 5" (1.651 m)   Wt 51.3 kg (113 lb)   LMP 2000 (Within Months)   SpO2 95%   BMI 18.80 kg/m²     Physical Exam  HENT:      Right Ear: There is impacted cerumen. Left Ear: There is impacted cerumen. Ears:      Comments: Post irrigation right clear left still impacted after at least 20 attempts at irrigation    Ear cerumen removal    Date/Time: 2023 4:00 PM    Performed by: Jesús Stevens MD  Authorized by: Jesús Stevens MD  Universal Protocol:  Consent: Verbal consent obtained.   Consent given by: patient  Patient understanding: patient states understanding of the procedure being performed  Patient consent: the patient's understanding of the procedure matches consent given  Patient identity confirmed: verbally with patient    Patient location:  Clinic  Procedure details:     Local anesthetic:  None    Location:  L ear and R ear    Procedure type: irrigation only    Post-procedure details:     Complication:  None    Hearing quality:  Improved    Patient tolerance of procedure:   Tolerated well, no immediate complications  Comments:      Unable to clear left ear after at least 20 attempts pt to cont otc drops and follow up will need assistant to hold pinna back  as canal does collapse         Daniel Sim MD

## 2023-11-27 ENCOUNTER — OFFICE VISIT (OUTPATIENT)
Dept: FAMILY MEDICINE CLINIC | Facility: CLINIC | Age: 73
End: 2023-11-27
Payer: MEDICARE

## 2023-11-27 VITALS — BODY MASS INDEX: 18.83 KG/M2 | WEIGHT: 113 LBS | RESPIRATION RATE: 16 BRPM | HEIGHT: 65 IN

## 2023-11-27 DIAGNOSIS — H61.23 BILATERAL IMPACTED CERUMEN: Primary | ICD-10-CM

## 2023-11-27 PROCEDURE — 69209 REMOVE IMPACTED EAR WAX UNI: CPT | Performed by: FAMILY MEDICINE

## 2023-11-27 NOTE — PROGRESS NOTES
Name: Fara Lopez      : 1950      MRN: [id number]  Encounter Provider: Mary Montgomery MD  Encounter Date: 2023   Encounter department: [facility] MEDICINE    Assessment & Plan     1. Bilateral impacted cerumen  Assessment & Plan:  Right cleared last visit left cleared today     Orders:  -     Ear cerumen removal           Subjective      HPI pt here for earwax removal left ear after many unsuccessful attempts last visit has been using drops  Review of Systems   HENT:  Positive for hearing loss (left ear). Current Outpatient Medications on File Prior to Visit   Medication Sig   • PreviDent 5000 Booster Plus 1.1 % PSTE USE PEA SIZE QUANTITY AND DO NOT RINSE AFTER BRUSHING   • Probiotic Product (PROBIOTIC-10 PO) Take 1 capsule by mouth daily in the early morning         Objective     Resp 16   Ht 5' 5" (1.651 m)   Wt 51.3 kg (113 lb)   LMP 2000 (Within Months)   BMI 18.80 kg/m²     Physical Exam  HENT:      Left Ear: There is impacted cerumen. Ear cerumen removal    Date/Time: 2023 3:00 PM    Performed by: Mary Montgomery MD  Authorized by: Mary Montgomery MD  Universal Protocol:  Consent: Verbal consent obtained. Consent given by: patient  Patient understanding: patient states understanding of the procedure being performed  Patient consent: the patient's understanding of the procedure matches consent given  Patient identity confirmed: verbally with patient    Patient location:  Clinic  Procedure details:     Local anesthetic:  None    Location:  L ear    Procedure type: irrigation only    Post-procedure details:     Complication:  None    Hearing quality:  Improved    Patient tolerance of procedure:   Tolerated well, no immediate complications      Mary Montgomery MD

## 2024-01-07 PROBLEM — H61.23 BILATERAL IMPACTED CERUMEN: Status: RESOLVED | Noted: 2023-11-08 | Resolved: 2024-01-07

## 2024-01-07 PROBLEM — Z00.00 MEDICARE ANNUAL WELLNESS VISIT, SUBSEQUENT: Status: RESOLVED | Noted: 2022-08-04 | Resolved: 2024-01-07

## 2024-03-11 ENCOUNTER — OFFICE VISIT (OUTPATIENT)
Dept: PLASTIC SURGERY | Facility: CLINIC | Age: 74
End: 2024-03-11
Payer: MEDICARE

## 2024-03-11 DIAGNOSIS — Z86.000 HISTORY OF DUCTAL CARCINOMA IN SITU (DCIS) OF BREAST: ICD-10-CM

## 2024-03-11 DIAGNOSIS — Z42.1 AFTERCARE POSTMASTECTOMY FOR BREAST RECONSTRUCTION: Primary | ICD-10-CM

## 2024-03-11 PROCEDURE — 99214 OFFICE O/P EST MOD 30 MIN: CPT | Performed by: SURGERY

## 2024-03-11 NOTE — PROGRESS NOTES
Assessment/Plan: Please see HPI.  She remains quite pleased with the results of her bilateral breast surgery, we discussed the potential to address the minor contour deficiencies/rippling of the right breast with autologous fat grafting, she would prefer, at this point, to leave well enough alone.  She is due for an MRI to assess the implant integrity and referral has been placed.  She is to call this office once the MRI has been completed.  She will be seen in 1 year, sooner should the need arise.           Diagnoses and all orders for this visit:    Aftercare postmastectomy for breast reconstruction  -     MRI breast wo con silicone implant eval only bilat; Future    History of ductal carcinoma in situ (DCIS) of breast  -     MRI breast wo con silicone implant eval only bilat; Future          Subjective: Breast reconstruction     Patient ID: Lilo Daly is a 73 y.o. female.    HPI Lilo presents today for routine, annual follow-up visit.  Briefly, she is status post right mastectomy followed by immediate tissue expander/acellular dermis reconstruction in July 2018 (mastectomy Dr. Wallace).  She subsequently underwent tissue expander/implant exchange in April 2019, and left breast balancing augmentation in December 2019.  She remains happy with the results of her breast surgery.  The following portions of the patient's history were reviewed and updated as appropriate: allergies, current medications, past family history, past medical history, past social history, past surgical history, and problem list.    Review of Systems   Constitutional:  Negative for chills and fever.   HENT:  Negative for hearing loss.    Eyes:  Negative for discharge and visual disturbance.   Respiratory:  Negative for chest tightness and shortness of breath.    Cardiovascular:  Negative for chest pain and leg swelling.   Gastrointestinal:  Negative for blood in stool, constipation, diarrhea and nausea.   Genitourinary:  Negative for  dysuria.   Musculoskeletal:  Negative for gait problem.   Skin:  Negative for rash.   Allergic/Immunologic: Negative for immunocompromised state.   Neurological:  Negative for seizures and headaches.   Hematological:  Does not bruise/bleed easily.   Psychiatric/Behavioral:  Negative for dysphoric mood. The patient is not nervous/anxious.        Objective:      LMP 01/01/2000 (Within Months)          Physical Exam  Constitutional:       Appearance: Normal appearance.   HENT:      Head: Normocephalic and atraumatic.   Eyes:      Extraocular Movements: Extraocular movements intact.      Pupils: Pupils are equal, round, and reactive to light.   Cardiovascular:      Rate and Rhythm: Normal rate.   Pulmonary:      Effort: Pulmonary effort is normal.   Abdominal:      Palpations: Abdomen is soft.   Musculoskeletal:         General: Normal range of motion.      Cervical back: Normal range of motion and neck supple.   Skin:     General: Skin is warm.      Comments: Breast examination reveals a reconstructed right breast with an implant in good position, the breast is soft, supple and without evidence of capsular contracture, there is some minor/anticipated rippling of the central breast mound, the left breast implant is in good position, the breast is soft, supple without evidence of capsular contracture, there is good breast symmetry, see photo in media   Neurological:      Mental Status: She is alert and oriented to person, place, and time.   Psychiatric:         Mood and Affect: Mood normal.

## 2024-04-15 ENCOUNTER — HOSPITAL ENCOUNTER (OUTPATIENT)
Dept: RADIOLOGY | Facility: HOSPITAL | Age: 74
Discharge: HOME | End: 2024-04-15
Payer: MEDICARE

## 2024-04-15 DIAGNOSIS — Z42.1 AFTERCARE POSTMASTECTOMY FOR BREAST RECONSTRUCTION: ICD-10-CM

## 2024-04-15 DIAGNOSIS — Z86.000 HISTORY OF DUCTAL CARCINOMA IN SITU (DCIS) OF BREAST: ICD-10-CM

## 2024-04-15 PROCEDURE — 77047 MRI BREAST C- BILATERAL: CPT

## 2024-06-05 ENCOUNTER — TELEPHONE (OUTPATIENT)
Dept: HEMATOLOGY ONCOLOGY | Facility: CLINIC | Age: 74
End: 2024-06-05

## 2024-06-05 NOTE — TELEPHONE ENCOUNTER
LM for patient regarding her appt. On 9/3/24 with Bella Dover. Provider's schedule has changed and we need to reschedule the appt. Left number for Hopeline to call back.

## 2024-06-05 NOTE — TELEPHONE ENCOUNTER
Appointment Change  Cancel, Reschedule, Change to Virtual      Who are you speaking with? Patient   If it is not the patient, is the caller listed on the communication consent form? N/A   Which provider is the appointment scheduled with? HALLEY Wadsworth   When was the original appointment scheduled?    Please list date and time 9/63/24 8:00 AM   At which location is the appointment scheduled to take place? [place]   Was the appointment rescheduled?     Was the appointment changed from an in person visit to a virtual visit?    If so, please list the details of the change. 9/4/24 10:30 AM   What is the reason for the appointment change? Provider requested change due to scheduling conflict.        Was STAR transport scheduled? No   Does STAR transport need to be scheduled for the new visit (if applicable) No   Does the patient need an infusion appointment rescheduled? No   Does the patient have an upcoming infusion appointment scheduled? If so, when? No   Is the patient undergoing chemotherapy? No   For appointments cancelled with less than 24 hours:  Was the no-show policy reviewed? Yes

## 2024-07-15 ENCOUNTER — HOSPITAL ENCOUNTER (OUTPATIENT)
Dept: RADIOLOGY | Facility: HOSPITAL | Age: 74
Discharge: HOME | End: 2024-07-15
Payer: MEDICARE

## 2024-07-15 VITALS — WEIGHT: 115 LBS | HEIGHT: 65 IN | BODY MASS INDEX: 19.16 KG/M2

## 2024-07-15 DIAGNOSIS — Z12.31 VISIT FOR SCREENING MAMMOGRAM: ICD-10-CM

## 2024-07-15 DIAGNOSIS — R92.30 DENSE BREAST: ICD-10-CM

## 2024-07-15 DIAGNOSIS — Z12.39 ENCOUNTER FOR BREAST CANCER SCREENING OTHER THAN MAMMOGRAM: ICD-10-CM

## 2024-07-15 PROCEDURE — 76641 ULTRASOUND BREAST COMPLETE: CPT

## 2024-07-15 PROCEDURE — 77067 SCR MAMMO BI INCL CAD: CPT

## 2024-07-15 PROCEDURE — 77063 BREAST TOMOSYNTHESIS BI: CPT

## 2024-09-04 ENCOUNTER — OFFICE VISIT (OUTPATIENT)
Dept: SURGICAL ONCOLOGY | Facility: CLINIC | Age: 74
End: 2024-09-04
Payer: MEDICARE

## 2024-09-04 VITALS
BODY MASS INDEX: 19.66 KG/M2 | HEART RATE: 70 BPM | OXYGEN SATURATION: 99 % | TEMPERATURE: 97.1 F | DIASTOLIC BLOOD PRESSURE: 78 MMHG | WEIGHT: 118 LBS | SYSTOLIC BLOOD PRESSURE: 128 MMHG | HEIGHT: 65 IN

## 2024-09-04 DIAGNOSIS — Z08 ENCOUNTER FOR FOLLOW-UP EXAMINATION AFTER COMPLETED TREATMENT FOR MALIGNANT NEOPLASM: ICD-10-CM

## 2024-09-04 DIAGNOSIS — Z12.39 ENCOUNTER FOR BREAST CANCER SCREENING OTHER THAN MAMMOGRAM: ICD-10-CM

## 2024-09-04 DIAGNOSIS — R92.30 DENSE BREAST: ICD-10-CM

## 2024-09-04 DIAGNOSIS — Z12.31 VISIT FOR SCREENING MAMMOGRAM: ICD-10-CM

## 2024-09-04 DIAGNOSIS — Z86.000 HISTORY OF DUCTAL CARCINOMA IN SITU (DCIS) OF BREAST: ICD-10-CM

## 2024-09-04 DIAGNOSIS — Z85.3 PERSONAL HISTORY OF MALIGNANT NEOPLASM OF BREAST: Primary | ICD-10-CM

## 2024-09-04 PROCEDURE — 99213 OFFICE O/P EST LOW 20 MIN: CPT

## 2024-09-04 NOTE — PROGRESS NOTES
Surgical Oncology Follow Up       [address] [place] [address] SURGICAL ONCOLOGY [facility]  [address] [facility] [id number]    Lilo Daly  1950  [id number]  [address] [facility] SURGICAL ONCOLOGY [place]  [address] [facility] [id number]    Chief Complaint   Patient presents with   • Follow-up       Assessment/Plan:  1. Personal history of malignant neoplasm of breast  - 1 year f/u    2. History of ductal carcinoma in situ (DCIS) of breast    3. Encounter for follow-up examination after completed treatment for malignant neoplasm    4. Encounter for breast cancer screening other than mammogram  - US breast left complete (abus); Future    5. Dense breast  - US breast left complete (abus); Future    6. Visit for screening mammogram  - Mammo screening left w 3d and cad; Future      Discussion/Summary: Patient is a 74-year-old female presenting today for a 1 year follow-up for right breast cancer diagnosed in 2018. Pathology revealed DCIS ER/ND negative. She had a right mastectomy with sentinel node biopsy with Dr. Wallace and had immediate reconstruction with Dr. Nelson.   She had a diagnostic mammogram and ultrasound of the left breast on 7/17/2023 which was BI-RADS 2 category 3 density. ABUS was benign as well. There were no concerns on her cbe. I will see the patient back in 1 year or sooner should the need arise. She was instructed to call with any questions or concerns prior to this time. All questions were answered today.     History of Present Illness:     Oncology History   History of ductal carcinoma in situ (DCIS) of breast   7/17/2018 Biopsy    Right breast biopsy  Upper inner breast  DCIS  Grade 3  ER 0  ND 0  Stage 0     9/12/2018 Surgery    Right mastectomy with sentinel lymph node biopsy  Ductal carcinoma in situ  1.6 cm  Grade 3  ER 0  ND 0  0/1 lymph node  Stage 0    Immediate reconstruction with Dr. Nelson (tissue expander)      10/1/2018 - 10/1/2018 Radiation    Radiation therapy not recommended          -Interval History: Patient is a 74-year-old female presenting today for a 1 year follow-up for right breast cancer diagnosed in 2018. She had a diagnostic mammogram and ultrasound of the left breast on 7/17/2023 which was BI-RADS 2 category 3 density. ABUS was benign as well. Patient denies changes on her breast exam. She denies persistent headaches, bone pain, back pain, shortness of breath, or abdominal pain.      Review of Systems:  Review of Systems   Constitutional:  Negative for activity change, appetite change, fatigue and unexpected weight change.   Respiratory:  Negative for cough and shortness of breath.    Cardiovascular:  Negative for chest pain.   Gastrointestinal:  Negative for abdominal pain, diarrhea, nausea and vomiting.   Endocrine: Negative for heat intolerance.   Musculoskeletal:  Negative for arthralgias, back pain and myalgias.   Skin:  Negative for rash.   Neurological:  Negative for weakness and headaches.   Hematological:  Negative for adenopathy.       Patient Active Problem List   Diagnosis   • History of ductal carcinoma in situ (DCIS) of breast   • Personal history of malignant neoplasm of breast   • Seborrheic keratosis   • Age-related cataract of both eyes   • History of colon polyps   • Osteopenia of multiple sites     Past Medical History:   Diagnosis Date   • Asthma    • Breast cancer (HCC) 2018    right   • Cancer (HCC)    • Childhood asthma     as a child   • PONV (postoperative nausea and vomiting)     Patient requests to be premedicated for N/V that can last 2 weeks post surgeries   • Wears glasses      Past Surgical History:   Procedure Laterality Date   • AUGMENTATION BREAST Left 12/02/2019    Procedure: BREAST AUGMENTATION;  Surgeon: Akin Nelson MD;  [facility];  Service: Plastics   • AUGMENTATION MAMMAPLASTY Left 2019   • BREAST BIOPSY  2019   • BREAST CYST ASPIRATION Left      2019   • BREAST IMPLANT Right 04/16/2019    Procedure: BREAST TISSUE EXPANDER/IMPLANT EXCHANGE; BREAST CAPSULECTOMY;  Surgeon: Akin Nelson MD;  [facility];  Service: Plastics   • MAMMO STEREOTACTIC BREAST BIOPSY RIGHT (ALL INC) Right 07/17/2018   • MASTECTOMY Right 2018   • MASTECTOMY W/ SENTINEL NODE BIOPSY Right 09/12/2018    Procedure: BREAST MASTECTOMY; LYMPHATIC MAPPING WITH BLUE DYE AND RADIOACTIVE DYE; SENTINEL LYMPH NODE BIOPSY (INJECT AT 0730 BY DR WALLACE IN THE OR);  Surgeon: Jose Wallace MD;  [facility];  Service: Surgical Oncology   • NV IMPLNT BIO IMPLNT FOR SOFT TISSUE REINFORCEMENT Right 09/12/2018    Procedure: ACELLULAR DERMAL MATRIX;  Surgeon: Akin Nelson MD;  [facility];  Service: Plastics   • NV TISSUE EXPANDER PLACEMENT BREAST RECONSTRUCTION Right 09/12/2018    Procedure: BREAST IMMEDIATE RECONSTRUCTION, TISSUE EXPANDER;  Surgeon: Akin Nelson MD;  [facility];  Service: Plastics   • US GUIDANCE BREAST BIOPSY LEFT EACH ADDITIONAL Left 07/01/2019   • US GUIDED BREAST BIOPSY LEFT COMPLETE Left 07/01/2019   • WISDOM TOOTH EXTRACTION       Family History   Problem Relation Age of Onset   • Hypertension Father    • Prostate cancer Father    • No Known Problems Sister    • Melanoma Sister    • No Known Problems Sister    • No Known Problems Sister    • No Known Problems Maternal Aunt      Social History     Socioeconomic History   • Marital status:      Spouse name: Not on file   • Number of children: Not on file   • Years of education: Not on file   • Highest education level: Not on file   Occupational History   • Not on file   Tobacco Use   • Smoking status: Never   • Smokeless tobacco: Never   Vaping Use   • Vaping status: Never Used   Substance and Sexual Activity   • Alcohol use: Yes     Alcohol/week: 1.0 standard drink of alcohol     Types: 1 Standard drinks or equivalent per week     Comment: Or less   • Drug use: No   • Sexual  activity: Yes     Partners: Male     Birth control/protection: Post-menopausal   Other Topics Concern   • Not on file   Social History Narrative   • Not on file     Social Determinants of Health     Financial Resource Strain: Not on file   Food Insecurity: Not on file   Transportation Needs: Not on file   Physical Activity: Not on file   Stress: Not on file   Social Connections: Not on file   Intimate Partner Violence: Not on file   Housing Stability: Not on file       Current Outpatient Medications:   •  PreviDent 5000 Booster Plus 1.1 % PSTE, USE PEA SIZE QUANTITY AND DO NOT RINSE AFTER BRUSHING, Disp: , Rfl:   •  Probiotic Product (PROBIOTIC-10 PO), Take 1 capsule by mouth daily in the early morning  , Disp: , Rfl:   No Known Allergies  Vitals:    09/04/24 1048   BP: 128/78   Pulse: 70   Temp: (!) 97.1 °F (36.2 °C)   SpO2: 99%       Physical Exam  Constitutional:       General: She is not in acute distress.     Appearance: Normal appearance.   Cardiovascular:      Rate and Rhythm: Normal rate and regular rhythm.      Pulses: Normal pulses.      Heart sounds: Normal heart sounds.   Pulmonary:      Effort: Pulmonary effort is normal.      Breath sounds: Normal breath sounds.   Chest:      Chest wall: No mass.   Breasts:     Right: No swelling, bleeding, mass, skin change or tenderness.      Left: No swelling, bleeding, inverted nipple, mass, nipple discharge, skin change or tenderness.       Abdominal:      General: Abdomen is flat.      Palpations: Abdomen is soft.   Lymphadenopathy:      Upper Body:      Right upper body: No supraclavicular, axillary or pectoral adenopathy.      Left upper body: No supraclavicular, axillary or pectoral adenopathy.   Skin:     General: Skin is warm.   Neurological:      General: No focal deficit present.      Mental Status: She is alert and oriented to person, place, and time.   Psychiatric:         Mood and Affect: Mood normal.         Behavior: Behavior normal.            Results:    Imaging  No results found.    I reviewed the above imaging data.      Advance Care Planning/Advance Directives:  Discussed disease status, cancer treatment plans and/or cancer treatment goals with the patient.

## 2024-11-11 ENCOUNTER — OFFICE VISIT (OUTPATIENT)
Dept: FAMILY MEDICINE CLINIC | Facility: CLINIC | Age: 74
End: 2024-11-11
Payer: MEDICARE

## 2024-11-11 VITALS
WEIGHT: 118 LBS | SYSTOLIC BLOOD PRESSURE: 144 MMHG | BODY MASS INDEX: 19.66 KG/M2 | HEART RATE: 73 BPM | OXYGEN SATURATION: 99 % | RESPIRATION RATE: 16 BRPM | TEMPERATURE: 97.5 F | HEIGHT: 65 IN | DIASTOLIC BLOOD PRESSURE: 78 MMHG

## 2024-11-11 DIAGNOSIS — R03.0 ELEVATED BLOOD PRESSURE READING IN OFFICE WITHOUT DIAGNOSIS OF HYPERTENSION: Primary | ICD-10-CM

## 2024-11-11 DIAGNOSIS — Z78.0 POSTMENOPAUSAL: ICD-10-CM

## 2024-11-11 DIAGNOSIS — Z00.00 MEDICARE ANNUAL WELLNESS VISIT, SUBSEQUENT: ICD-10-CM

## 2024-11-11 PROCEDURE — G0439 PPPS, SUBSEQ VISIT: HCPCS | Performed by: FAMILY MEDICINE

## 2024-11-11 PROCEDURE — 99213 OFFICE O/P EST LOW 20 MIN: CPT | Performed by: FAMILY MEDICINE

## 2024-11-11 NOTE — ASSESSMENT & PLAN NOTE
Cont exercise avoid high salt /added salt  food nrec heck 1 mo pt to invest in BP cuff at home and journal some readings for me     Orders:    Basic metabolic panel; Future

## 2024-11-11 NOTE — PATIENT INSTRUCTIONS
Medicare Preventive Visit Patient Instructions  Thank you for completing your Welcome to Medicare Visit or Medicare Annual Wellness Visit today. Your next wellness visit will be due in one year (11/12/2025).  The screening/preventive services that you may require over the next 5-10 years are detailed below. Some tests may not apply to you based off risk factors and/or age. Screening tests ordered at today's visit but not completed yet may show as past due. Also, please note that scanned in results may not display below.  Preventive Screenings:  Service Recommendations Previous Testing/Comments   Colorectal Cancer Screening  * Colonoscopy    * Fecal Occult Blood Test (FOBT)/Fecal Immunochemical Test (FIT)  * Fecal DNA/Cologuard Test  * Flexible Sigmoidoscopy Age: 45-75 years old   Colonoscopy: every 10 years (may be performed more frequently if at higher risk)  OR  FOBT/FIT: every 1 year  OR  Cologuard: every 3 years  OR  Sigmoidoscopy: every 5 years  Screening may be recommended earlier than age 45 if at higher risk for colorectal cancer. Also, an individualized decision between you and your healthcare provider will decide whether screening between the ages of 76-85 would be appropriate. Colonoscopy: 12/02/2022  FOBT/FIT: Not on file  Cologuard: Not on file  Sigmoidoscopy: Not on file    Screening Current     Breast Cancer Screening Age: 40+ years old  Frequency: every 1-2 years  Not required if history of left and right mastectomy Mammogram: 07/15/2024    History Breast Cancer  Screening Current   Cervical Cancer Screening Between the ages of 21-29, pap smear recommended once every 3 years.   Between the ages of 30-65, can perform pap smear with HPV co-testing every 5 years.   Recommendations may differ for women with a history of total hysterectomy, cervical cancer, or abnormal pap smears in past. Pap Smear: 05/29/2018    Screening Not Indicated  Screening Current   Hepatitis C Screening Once for adults born  between 1945 and 1965  More frequently in patients at high risk for Hepatitis C Hep C Antibody: 08/06/2022    Screening Current   Diabetes Screening 1-2 times per year if you're at risk for diabetes or have pre-diabetes Fasting glucose: 89 mg/dL (8/6/2022)  A1C: No results in last 5 years (No results in last 5 years)  Risks and Benefits Discussed  Due for Blood Glucose   Cholesterol Screening Once every 5 years if you don't have a lipid disorder. May order more often based on risk factors. Lipid panel: 08/06/2022    Risks and Benefits Discussed  Due for Lipid Panel     Other Preventive Screenings Covered by Medicare:  Abdominal Aortic Aneurysm (AAA) Screening: covered once if your at risk. You're considered to be at risk if you have a family history of AAA.  Lung Cancer Screening: covers low dose CT scan once per year if you meet all of the following conditions: (1) Age 55-77; (2) No signs or symptoms of lung cancer; (3) Current smoker or have quit smoking within the last 15 years; (4) You have a tobacco smoking history of at least 20 pack years (packs per day multiplied by number of years you smoked); (5) You get a written order from a healthcare provider.  Glaucoma Screening: covered annually if you're considered high risk: (1) You have diabetes OR (2) Family history of glaucoma OR (3)  aged 50 and older OR (4)  American aged 65 and older  Osteoporosis Screening: covered every 2 years if you meet one of the following conditions: (1) You're estrogen deficient and at risk for osteoporosis based off medical history and other findings; (2) Have a vertebral abnormality; (3) On glucocorticoid therapy for more than 3 months; (4) Have primary hyperparathyroidism; (5) On osteoporosis medications and need to assess response to drug therapy.   Last bone density test (DXA Scan): 08/17/2022.  HIV Screening: covered annually if you're between the age of 15-65. Also covered annually if you are younger  than 15 and older than 65 with risk factors for HIV infection. For pregnant patients, it is covered up to 3 times per pregnancy.    Immunizations:  Immunization Recommendations   Influenza Vaccine Annual influenza vaccination during flu season is recommended for all persons aged >= 6 months who do not have contraindications   Pneumococcal Vaccine   * Pneumococcal conjugate vaccine = PCV13 (Prevnar 13), PCV15 (Vaxneuvance), PCV20 (Prevnar 20)  * Pneumococcal polysaccharide vaccine = PPSV23 (Pneumovax) Adults 19-65 yo with certain risk factors or if 65+ yo  If never received any pneumonia vaccine: recommend Prevnar 20 (PCV20)  Give PCV20 if previously received 1 dose of PCV13 or PPSV23   Hepatitis B Vaccine 3 dose series if at intermediate or high risk (ex: diabetes, end stage renal disease, liver disease)   Respiratory syncytial virus (RSV) Vaccine - COVERED BY MEDICARE PART D  * RSVPreF3 (Arexvy) CDC recommends that adults 60 years of age and older may receive a single dose of RSV vaccine using shared clinical decision-making (SCDM)   Tetanus (Td) Vaccine - COST NOT COVERED BY MEDICARE PART B Following completion of primary series, a booster dose should be given every 10 years to maintain immunity against tetanus. Td may also be given as tetanus wound prophylaxis.   Tdap Vaccine - COST NOT COVERED BY MEDICARE PART B Recommended at least once for all adults. For pregnant patients, recommended with each pregnancy.   Shingles Vaccine (Shingrix) - COST NOT COVERED BY MEDICARE PART B  2 shot series recommended in those 19 years and older who have or will have weakened immune systems or those 50 years and older     Health Maintenance Due:      Topic Date Due   • Breast Cancer Screening: Mammogram  07/15/2025   • Colorectal Cancer Screening  12/01/2025   • Hepatitis C Screening  Completed     Immunizations Due:      Topic Date Due   • COVID-19 Vaccine (4 - 2023-24 season) 09/01/2024     Advance Directives   What are  advance directives?  Advance directives are legal documents that state your wishes and plans for medical care. These plans are made ahead of time in case you lose your ability to make decisions for yourself. Advance directives can apply to any medical decision, such as the treatments you want, and if you want to donate organs.   What are the types of advance directives?  There are many types of advance directives, and each state has rules about how to use them. You may choose a combination of any of the following:  Living will:  This is a written record of the treatment you want. You can also choose which treatments you do not want, which to limit, and which to stop at a certain time. This includes surgery, medicine, IV fluid, and tube feedings.   Durable power of  for healthcare (DPAHC):  This is a written record that states who you want to make healthcare choices for you when you are unable to make them for yourself. This person, called a proxy, is usually a family member or a friend. You may choose more than 1 proxy.  Do not resuscitate (DNR) order:  A DNR order is used in case your heart stops beating or you stop breathing. It is a request not to have certain forms of treatment, such as CPR. A DNR order may be included in other types of advance directives.  Medical directive:  This covers the care that you want if you are in a coma, near death, or unable to make decisions for yourself. You can list the treatments you want for each condition. Treatment may include pain medicine, surgery, blood transfusions, dialysis, IV or tube feedings, and a ventilator (breathing machine).  Values history:  This document has questions about your views, beliefs, and how you feel and think about life. This information can help others choose the care that you would choose.  Why are advance directives important?  An advance directive helps you control your care. Although spoken wishes may be used, it is better to have your  wishes written down. Spoken wishes can be misunderstood, or not followed. Treatments may be given even if you do not want them. An advance directive may make it easier for your family to make difficult choices about your care.       © Copyright [employer] 2018 Information is for End User's use only and may not be sold, redistributed or otherwise used for commercial purposes. All illustrations and images included in CareNotes® are the copyrighted property of [employer] or [employer]

## 2024-11-11 NOTE — PROGRESS NOTES
Ambulatory Visit  Name: Lilo Daly      : 1950      MRN: [id number]  Encounter Provider: Anette Ayers MD  Encounter Date: 2024   Encounter department: [facility] MEDICINE    Assessment & Plan  Elevated blood pressure reading in office without diagnosis of hypertension  Cont exercise avoid high salt /added salt  food nrec heck 1 mo pt to invest in BP cuff at home and journal some readings for me     Orders:    Basic metabolic panel; Future    Postmenopausal    Orders:    DXA bone density spine hip and pelvis; Future    Medicare annual wellness visit, subsequent    Orders:    Basic metabolic panel; Future       Preventive health issues were discussed with patient, and age appropriate screening tests were ordered as noted in patient's After Visit Summary. Personalized health advice and appropriate referrals for health education or preventive services given if needed, as noted in patient's After Visit Summary.    History of Present Illness     HPI   Patient Care Team:  Anetet Ayers MD as PCP - General (Family Medicine)  Jose Wallace MD as Surgeon (Oncology)  Akin Nelson MD (Plastic Surgery)  Radha Freitas MD (Obstetrics and Gynecology)  HALLEY Santos as Nurse Practitioner (Surgical Oncology)    Review of Systems   Respiratory:  Negative for cough, chest tightness and shortness of breath.    Cardiovascular:  Negative for chest pain, palpitations and leg swelling.   Neurological:  Negative for dizziness, weakness, light-headedness and headaches.   Psychiatric/Behavioral:  Negative for dysphoric mood. The patient is not nervous/anxious.      Medical History Reviewed by provider this encounter:  Tobacco  Allergies  Meds  Problems  Med Hx  Surg Hx  Fam Hx       Annual Wellness Visit Questionnaire   Lilo is here for her Subsequent Wellness visit.     Health Risk Assessment:   Patient rates overall health as very good. Patient feels that their physical  health rating is same. Patient is very satisfied with their life. Eyesight was rated as same. Hearing was rated as same. Patient feels that their emotional and mental health rating is same. Patients states they are never, rarely angry. Patient states they are sometimes unusually tired/fatigued. Pain experienced in the last 7 days has been none. Patient states that she has experienced no weight loss or gain in last 6 months.     Depression Screening:   PHQ-2 Score: 0      Fall Risk Screening:   In the past year, patient has experienced: no history of falling in past year      Urinary Incontinence Screening:   Patient has not leaked urine accidently in the last six months.     Home Safety:  Patient does not have trouble with stairs inside or outside of their home. Patient has working smoke alarms and has working carbon monoxide detector. Home safety hazards include: none.     Nutrition:   Current diet is Regular.     Medications:   Patient is currently taking over-the-counter supplements. OTC medications include: see medication list. Patient is able to manage medications.     Activities of Daily Living (ADLs)/Instrumental Activities of Daily Living (IADLs):   Walk and transfer into and out of bed and chair?: Yes  Dress and groom yourself?: Yes    Bathe or shower yourself?: Yes    Feed yourself? Yes  Do your laundry/housekeeping?: Yes  Manage your money, pay your bills and track your expenses?: Yes  Make your own meals?: Yes    Do your own shopping?: Yes    Previous Hospitalizations:   Any hospitalizations or ED visits within the last 12 months?: No      Advance Care Planning:   Living will: Yes    Durable POA for healthcare: Yes    Advanced directive: Yes      Cognitive Screening:   Provider or family/friend/caregiver concerned regarding cognition?: No    PREVENTIVE SCREENINGS      Cardiovascular Screening:    General: Risks and Benefits Discussed    Due for: Lipid Panel      Diabetes Screening:     General: Risks and  "Benefits Discussed    Due for: Blood Glucose      Colorectal Cancer Screening:     General: Screening Current      Breast Cancer Screening:     General: History Breast Cancer and Screening Current      Cervical Cancer Screening:    General: Screening Not Indicated and Screening Current      Osteoporosis Screening:    General: Risks and Benefits Discussed    Due for: DXA Axial      Abdominal Aortic Aneurysm (AAA) Screening:        General: Screening Not Indicated      Lung Cancer Screening:     General: Screening Not Indicated      Hepatitis C Screening:    General: Screening Current    Screening, Brief Intervention, and Referral to Treatment (SBIRT)    Screening      AUDIT-C Screenin) How often did you have a drink containing alcohol in the past year? monthly or less  2) How many drinks did you have on a typical day when you were drinking in the past year? 1 to 2    Single Item Drug Screening:  How often have you used an illegal drug (including marijuana) or a prescription medication for non-medical reasons in the past year? never    Single Item Drug Screen Score: 0  Interpretation: Negative screen for possible drug use disorder       No results found.    Objective     /80 (BP Location: Left arm, Patient Position: Sitting, Cuff Size: Standard)   Pulse 73   Temp 97.5 °F (36.4 °C) (Tympanic)   Resp 16   Ht 5' 5\" (1.651 m)   Wt 53.5 kg (118 lb)   LMP 2000 (Within Months)   SpO2 99%   BMI 19.64 kg/m²     Physical Exam  Constitutional:       Appearance: Normal appearance.   Cardiovascular:      Rate and Rhythm: Normal rate and regular rhythm.      Heart sounds: Normal heart sounds.   Pulmonary:      Effort: Pulmonary effort is normal.      Breath sounds: Normal breath sounds.   Musculoskeletal:      Right lower leg: No edema.      Left lower leg: No edema.   Neurological:      General: No focal deficit present.      Mental Status: She is oriented to person, place, and time. Mental status is at " baseline.   Psychiatric:         Mood and Affect: Mood normal.         Behavior: Behavior normal.

## 2024-11-12 ENCOUNTER — APPOINTMENT (OUTPATIENT)
Dept: LAB | Facility: AMBULARY SURGERY CENTER | Age: 74
End: 2024-11-12
Payer: MEDICARE

## 2024-11-12 DIAGNOSIS — R03.0 ELEVATED BLOOD PRESSURE READING IN OFFICE WITHOUT DIAGNOSIS OF HYPERTENSION: ICD-10-CM

## 2024-11-12 DIAGNOSIS — Z00.00 MEDICARE ANNUAL WELLNESS VISIT, SUBSEQUENT: ICD-10-CM

## 2024-11-12 LAB
ANION GAP SERPL CALCULATED.3IONS-SCNC: 8 MMOL/L (ref 4–13)
BUN SERPL-MCNC: 17 MG/DL (ref 5–25)
CALCIUM SERPL-MCNC: 9.5 MG/DL (ref 8.4–10.2)
CHLORIDE SERPL-SCNC: 102 MMOL/L (ref 96–108)
CO2 SERPL-SCNC: 29 MMOL/L (ref 21–32)
CREAT SERPL-MCNC: 0.58 MG/DL (ref 0.6–1.3)
GFR SERPL CREATININE-BSD FRML MDRD: 92 ML/MIN/1.73SQ M
GLUCOSE P FAST SERPL-MCNC: 92 MG/DL (ref 65–99)
POTASSIUM SERPL-SCNC: 4.5 MMOL/L (ref 3.5–5.3)
SODIUM SERPL-SCNC: 139 MMOL/L (ref 135–147)

## 2024-11-12 PROCEDURE — 36415 COLL VENOUS BLD VENIPUNCTURE: CPT

## 2024-11-12 PROCEDURE — 80048 BASIC METABOLIC PNL TOTAL CA: CPT

## 2024-12-12 ENCOUNTER — OFFICE VISIT (OUTPATIENT)
Dept: FAMILY MEDICINE CLINIC | Facility: CLINIC | Age: 74
End: 2024-12-12
Payer: MEDICARE

## 2024-12-12 VITALS
SYSTOLIC BLOOD PRESSURE: 122 MMHG | HEART RATE: 78 BPM | WEIGHT: 117 LBS | TEMPERATURE: 98.3 F | OXYGEN SATURATION: 96 % | RESPIRATION RATE: 16 BRPM | DIASTOLIC BLOOD PRESSURE: 68 MMHG | BODY MASS INDEX: 19.49 KG/M2 | HEIGHT: 65 IN

## 2024-12-12 DIAGNOSIS — R03.0 ELEVATED BLOOD PRESSURE READING IN OFFICE WITHOUT DIAGNOSIS OF HYPERTENSION: Primary | ICD-10-CM

## 2024-12-12 PROCEDURE — G2211 COMPLEX E/M VISIT ADD ON: HCPCS | Performed by: FAMILY MEDICINE

## 2024-12-12 PROCEDURE — 99213 OFFICE O/P EST LOW 20 MIN: CPT | Performed by: FAMILY MEDICINE

## 2024-12-12 NOTE — PROGRESS NOTES
"Name: Lilo Daly      : 1950      MRN: [id number]  Encounter Provider: Anette Ayers MD  Encounter Date: 2024   Encounter department: [facility] FAMILY MEDICINE  :  Assessment & Plan  Elevated blood pressure reading in office without diagnosis of hypertension  Normal readings now cont to monitor              History of Present Illness     Here for reevaluation of elevated blood pressure pt monitoring at home and always  nl       Review of Systems   Respiratory:  Negative for cough, chest tightness and shortness of breath.    Cardiovascular:  Negative for chest pain, palpitations and leg swelling.   Neurological:  Negative for dizziness, weakness, light-headedness and headaches.   Psychiatric/Behavioral:  Negative for dysphoric mood. The patient is not nervous/anxious.        Objective   /68   Pulse 78   Temp 98.3 °F (36.8 °C) (Tympanic)   Resp 16   Ht 5' 5\" (1.651 m)   Wt 53.1 kg (117 lb)   LMP 2000 (Within Months)   SpO2 96%   BMI 19.47 kg/m²      Physical Exam  Constitutional:       Appearance: Normal appearance.   Cardiovascular:      Rate and Rhythm: Normal rate and regular rhythm.      Heart sounds: Normal heart sounds.   Pulmonary:      Effort: Pulmonary effort is normal.      Breath sounds: Normal breath sounds.   Musculoskeletal:      Right lower leg: No edema.      Left lower leg: No edema.   Neurological:      General: No focal deficit present.      Mental Status: She is oriented to person, place, and time. Mental status is at baseline.   Psychiatric:         Mood and Affect: Mood normal.         Behavior: Behavior normal.         "

## 2025-02-13 ENCOUNTER — HOSPITAL ENCOUNTER (OUTPATIENT)
Dept: RADIOLOGY | Age: 75
Discharge: HOME | End: 2025-02-13
Payer: MEDICARE

## 2025-02-13 VITALS — HEIGHT: 64 IN | WEIGHT: 116 LBS | BODY MASS INDEX: 19.81 KG/M2

## 2025-02-13 DIAGNOSIS — Z78.0 POSTMENOPAUSAL: ICD-10-CM

## 2025-02-13 PROCEDURE — 77080 DXA BONE DENSITY AXIAL: CPT

## 2025-02-14 ENCOUNTER — RESULTS FOLLOW-UP (OUTPATIENT)
Dept: FAMILY MEDICINE CLINIC | Facility: CLINIC | Age: 75
End: 2025-02-14

## 2025-03-10 ENCOUNTER — OFFICE VISIT (OUTPATIENT)
Age: 75
End: 2025-03-10
Payer: MEDICARE

## 2025-03-10 DIAGNOSIS — Z42.1 AFTERCARE POSTMASTECTOMY FOR BREAST RECONSTRUCTION: Primary | ICD-10-CM

## 2025-03-10 PROCEDURE — 99213 OFFICE O/P EST LOW 20 MIN: CPT | Performed by: SURGERY

## 2025-03-10 NOTE — PROGRESS NOTES
:  Assessment & Plan  Aftercare postmastectomy for breast reconstruction  Please see HPI.  We again discussed the option of autologous fat grafting to address the rippling of the right breast, as well as the option of periareolar/circumareolar mastopexy to address the asymmetry and nipple position (due to nipple removal at time of right mastectomy).  At this point Lilo feels comfortable appearance of breasts, states that symmetry is quite good when wearing a bra, and she would prefer to defer avoid surgery at this time.  We mutually agreed to have her return in 1 year, sooner should the need arise.           History of Present Illness     Lilo Daly is a 74 y.o. female she presents today for routine, annual follow-up visit  HPI Lilo is status post right mastectomy from July 2018 (Dr. Wallace), she underwent immediate expander reconstruction, followed by tissue expander/implant exchange in 2019, she currently has a Holland 300 cc high-profile smooth round silicone gel implant and on the right side, she underwent a left balancing breast augmentation in December 2019, and currently has a Holland 150 cc moderate profile smooth round silicone gel implant in place on the left.  Overall, she is doing well, she remains pleased with the results of bilateral breast surgery, MRI from April 15, 2024 revealed intact bilateral subpectoral implants.  She will be due for a repeat MRI to assess implant integrity 3 to 4 years subsequent to the prior visit (April 2027 to April 2028).  Review of Systems   Constitutional:  Negative for chills and fever.   HENT:  Negative for hearing loss.    Eyes:  Negative for discharge and visual disturbance.   Respiratory:  Negative for chest tightness and shortness of breath.    Cardiovascular:  Negative for chest pain and leg swelling.   Gastrointestinal:  Negative for blood in stool, constipation, diarrhea and nausea.   Genitourinary:  Negative for dysuria.   Musculoskeletal:  Negative for  gait problem.   Skin:  Negative for rash.   Allergic/Immunologic: Negative for immunocompromised state.   Neurological:  Negative for seizures and headaches.   Hematological:  Does not bruise/bleed easily.   Psychiatric/Behavioral:  Negative for dysphoric mood. The patient is not nervous/anxious.    Objective   LMP 01/01/2000 (Within Months)      Physical Exam  Constitutional:       Appearance: Normal appearance.   HENT:      Head: Normocephalic and atraumatic.   Eyes:      Extraocular Movements: Extraocular movements intact.      Pupils: Pupils are equal, round, and reactive to light.   Cardiovascular:      Rate and Rhythm: Normal rate.   Pulmonary:      Effort: Pulmonary effort is normal.   Abdominal:      Palpations: Abdomen is soft.   Musculoskeletal:         General: Normal range of motion.      Cervical back: Normal range of motion and neck supple.   Skin:     General: Skin is warm.      Comments: Breast examination reveals a reconstructed right breast with an implant in place, there is some minor/anticipated rippling, nipple areolar complex tattoo, the left breast essentially symmetric in size, has an implant in place, the breast is soft, supple without evidence of contracture or rippling, left breast is situated somewhat lower on the chest and the right (secondary to nipple areolar complex removal at the time of right mastectomy), see photo in media   Neurological:      Mental Status: She is alert and oriented to person, place, and time.   Psychiatric:         Mood and Affect: Mood normal.

## 2025-08-11 ENCOUNTER — HOSPITAL ENCOUNTER (OUTPATIENT)
Dept: RADIOLOGY | Facility: HOSPITAL | Age: 75
Discharge: HOME | End: 2025-08-11
Payer: MEDICARE

## (undated) DEVICE — GLOVE SRG BIOGEL 6.5

## (undated) DEVICE — INTENDED FOR TISSUE SEPARATION, AND OTHER PROCEDURES THAT REQUIRE A SHARP SURGICAL BLADE TO PUNCTURE OR CUT.: Brand: BARD-PARKER ® CARBON RIB-BACK BLADES

## (undated) DEVICE — UTILITY MARKER,BLACK WITH LABELS: Brand: DEVON

## (undated) DEVICE — PROXIMATE SKIN STAPLERS (35 WIDE) CONTAINS 35 STAINLESS STEEL STAPLES (FIXED HEAD): Brand: PROXIMATE

## (undated) DEVICE — TRAY FOLEY 16FR URIMETER SURESTEP

## (undated) DEVICE — SYRINGE 50ML LL

## (undated) DEVICE — TELFA ADHESIVE ISLAND DRESSING: Brand: TELFA

## (undated) DEVICE — CHLORAPREP HI-LITE 26ML ORANGE

## (undated) DEVICE — VIAL DECANTER

## (undated) DEVICE — BETHLEHEM UNIVERSAL BREAST PK: Brand: CARDINAL HEALTH

## (undated) DEVICE — GAUZE SPONGES,16 PLY: Brand: CURITY

## (undated) DEVICE — NEEDLE 25G X 1 1/2

## (undated) DEVICE — 3M™ TEGADERM™ TRANSPARENT FILM DRESSING FRAME STYLE, 1626W, 4 IN X 4-3/4 IN (10 CM X 12 CM), 50/CT 4CT/CASE: Brand: 3M™ TEGADERM™

## (undated) DEVICE — DRESSING EXUDERM SATIN HYDROCOLLOID 4 X 4 IN

## (undated) DEVICE — SUT VICRYL 2-0 CTB-1 36 IN JB945

## (undated) DEVICE — ADHESIVE SKN CLSR HISTOACRYL FLEX 0.5ML LF

## (undated) DEVICE — 3M™ STERI-STRIP™ REINFORCED ADHESIVE SKIN CLOSURES, R1547, 1/2 IN X 4 IN (12 MM X 100 MM), 6 STRIPS/ENVELOPE: Brand: 3M™ STERI-STRIP™

## (undated) DEVICE — SUT STRATAFIX SPIRAL 4-0 PGA/PCL 30 X 30 CM SXMD2B409

## (undated) DEVICE — PAD GROUNDING ADULT

## (undated) DEVICE — GLOVE SRG BIOGEL 7

## (undated) DEVICE — JP CHAN DRN SIL HUBLESS 15FR W/TRO: Brand: CARDINAL HEALTH

## (undated) DEVICE — BULB SYRINGE,IRRIGATION WITH PROTECTIVE CAP: Brand: DOVER

## (undated) DEVICE — PENCIL ELECTROSURG E-Z CLEAN -0035H

## (undated) DEVICE — LIGHT HANDLE COVER SLEEVE DISP BLUE STELLAR

## (undated) DEVICE — IV CATH 18 G X 1.16 IN

## (undated) DEVICE — GLOVE INDICATOR PI UNDERGLOVE SZ 6.5 BLUE

## (undated) DEVICE — SUT VICRYL 2-0 SH 27 IN UNDYED J417H

## (undated) DEVICE — TIBURON TRANSVERSE LAPAROTOMY SHEET: Brand: CONVERTORS

## (undated) DEVICE — TELFA NON-ADHERENT ABSORBENT DRESSING: Brand: TELFA

## (undated) DEVICE — SUT VICRYL 3-0 SH 27 IN J416H

## (undated) DEVICE — ELECTRODE BLADE MOD E-Z CLEAN  2.75IN 7CM -0012AM

## (undated) DEVICE — SKIN MARKER DUAL TIP WITH RULER CAP, FLEXIBLE RULER AND LABELS: Brand: DEVON

## (undated) DEVICE — IV CATH INTROCAN 18G X 1 1/4 SAFETY

## (undated) DEVICE — INTENDED FOR TISSUE SEPARATION, AND OTHER PROCEDURES THAT REQUIRE A SHARP SURGICAL BLADE TO PUNCTURE OR CUT.: Brand: BARD-PARKER SAFETY BLADES SIZE 15, STERILE

## (undated) DEVICE — PLUMEPEN PRO 10FT

## (undated) DEVICE — ELECTRODE BLADE E-Z CLEAN 6.5IN -0014

## (undated) DEVICE — 3M™ TEGADERM™ TRANSPARENT FILM DRESSING FRAME STYLE, 1624W, 2-3/8 IN X 2-3/4 IN (6 CM X 7 CM), 100/CT 4CT/CASE: Brand: 3M™ TEGADERM™

## (undated) DEVICE — SUT MONOCRYL 4-0 PS-2 18 IN Y496G

## (undated) DEVICE — CHEST/BREAST DRAPE: Brand: CONVERTORS

## (undated) DEVICE — SMOKE EVACUATION TUBING WITH 8 IN INTEGRAL WAND AND SPONGE GUARD: Brand: BUFFALO FILTER

## (undated) DEVICE — DRAPE EQUIPMENT RF WAND

## (undated) DEVICE — SUT SILK 2-0 SH 30 IN K833H

## (undated) DEVICE — IV SET INJECTION CARESITE VALVE